# Patient Record
Sex: FEMALE | Race: WHITE | NOT HISPANIC OR LATINO | Employment: FULL TIME | URBAN - METROPOLITAN AREA
[De-identification: names, ages, dates, MRNs, and addresses within clinical notes are randomized per-mention and may not be internally consistent; named-entity substitution may affect disease eponyms.]

---

## 2017-07-20 ENCOUNTER — TRANSFERRED RECORDS (OUTPATIENT)
Dept: HEALTH INFORMATION MANAGEMENT | Facility: CLINIC | Age: 27
End: 2017-07-20

## 2017-10-11 ENCOUNTER — OFFICE VISIT (OUTPATIENT)
Dept: OPTOMETRY | Facility: CLINIC | Age: 27
End: 2017-10-11
Payer: COMMERCIAL

## 2017-10-11 DIAGNOSIS — N94.6 DYSMENORRHEA: ICD-10-CM

## 2017-10-11 DIAGNOSIS — H40.039 ANATOMICAL NARROW ANGLE: Primary | ICD-10-CM

## 2017-10-11 DIAGNOSIS — Z01.00 EXAMINATION OF EYES AND VISION: ICD-10-CM

## 2017-10-11 DIAGNOSIS — H21.502: ICD-10-CM

## 2017-10-11 PROCEDURE — 92015 DETERMINE REFRACTIVE STATE: CPT | Performed by: OPTOMETRIST

## 2017-10-11 PROCEDURE — 92014 COMPRE OPH EXAM EST PT 1/>: CPT | Performed by: OPTOMETRIST

## 2017-10-11 RX ORDER — NORGESTREL-ETHINYL ESTRADIOL 0.3-0.03MG
TABLET ORAL
Qty: 84 TABLET | Refills: 3 | Status: SHIPPED | OUTPATIENT
Start: 2017-10-11 | End: 2018-09-24

## 2017-10-11 RX ORDER — CIPROFLOXACIN AND DEXAMETHASONE 3; 1 MG/ML; MG/ML
SUSPENSION/ DROPS AURICULAR (OTIC)
COMMUNITY
Start: 2017-09-26 | End: 2017-11-06

## 2017-10-11 ASSESSMENT — REFRACTION_MANIFEST
OD_AXIS: 050
OD_SPHERE: -0.25
OS_AXIS: 141
OD_CYLINDER: +0.25
OS_CYLINDER: +0.25
METHOD_AUTOREFRACTION: 1
OS_SPHERE: -0.50
OD_SPHERE: -0.50
OS_SPHERE: PLANO
OS_CYLINDER: SPHERE
OD_CYLINDER: +0.25
OD_AXIS: 061

## 2017-10-11 ASSESSMENT — VISUAL ACUITY
OD_SC: 20/20
OS_SC: 20/20
OS_SC: 20/20
OD_SC: 20/20
METHOD: SNELLEN - LINEAR

## 2017-10-11 ASSESSMENT — TONOMETRY
IOP_METHOD: APPLANATION
OS_IOP_MMHG: 16
OD_IOP_MMHG: 21

## 2017-10-11 ASSESSMENT — CUP TO DISC RATIO
OS_RATIO: 0.1
OD_RATIO: 0.1

## 2017-10-11 ASSESSMENT — CONF VISUAL FIELD
OD_NORMAL: 1
OS_NORMAL: 1

## 2017-10-11 ASSESSMENT — KERATOMETRY
OS_AXISANGLE_DEGREES: 110
OS_K2POWER_DIOPTERS: 43.25
OD_AXISANGLE_DEGREES: 83
METHOD_AUTO_MANUAL: AUTOMATED
OD_K2POWER_DIOPTERS: 43.37
OS_AXISANGLE2_DEGREES: 20
OD_AXISANGLE2_DEGREES: 173
OD_K1POWER_DIOPTERS: 42.75
OS_K1POWER_DIOPTERS: 42.62

## 2017-10-11 ASSESSMENT — SLIT LAMP EXAM - LIDS
COMMENTS: NORMAL
COMMENTS: NORMAL

## 2017-10-11 ASSESSMENT — EXTERNAL EXAM - LEFT EYE: OS_EXAM: NORMAL

## 2017-10-11 ASSESSMENT — EXTERNAL EXAM - RIGHT EYE: OD_EXAM: NORMAL

## 2017-10-11 NOTE — TELEPHONE ENCOUNTER
Future appts noted.  Prescription approved per [facility] Refill Protocol.  Margie Camejo RN

## 2017-10-11 NOTE — MR AVS SNAPSHOT
After Visit Summary   10/11/2017    Connie Bhakta    MRN: [id number]           Patient Information     Date Of Birth          1990        Visit Information        Provider Department      10/11/2017 3:40 PM Jeanette Cotton OD [facility]        Today's Diagnoses     Anatomical narrow angle    -  1    Examination of eyes and vision          Care Instructions    Schedule a narrow angle assessment at [place]           Follow-ups after your visit        Additional Services     OPHTHALMOLOGY ADULT REFERRAL       Your provider has referred you to:  [facility]: [facility] [phone]   [email or web address]      Please be aware that coverage of these services is subject to the terms and limitations of your health insurance plan.  Call member services at your health plan with any benefit or coverage questions.      Please bring the following to your appointment:  >>   Any x-rays, CTs or MRIs which have been performed.  Contact the facility where they were done to arrange for  prior to your scheduled appointment.  Any new CT, MRI or other procedures ordered by your specialist must be performed at a [place] facility or coordinated by your clinic's referral office.    >>   List of current medications   >>   This referral request   >>   Any documents/labs given to you for this referral                  Follow-up notes from your care team     Return in about 1 year (around 10/11/2018).      Your next 10 appointments already scheduled     Nov 06, 2017  9:30 AM CST   Office Visit with Agustin Diop MD   [facility]    [address]  [facility] [id number] [phone]           Bring a current list of meds and any records pertaining to this visit. For Physicals, please bring immunization records and any forms needing to be filled out. Please arrive 10 minutes early to complete  "paperwork.            2017 10:20 AM CST   PHYSICAL with Pat Pearson MD   [facility]    [address]  [facility] [address] [phone]              Who to contact     If you have questions or need follow up information about today's clinic visit or your schedule please contact [facility] directly at [phone].  Normal or non-critical lab and imaging results will be communicated to you by Blueboxhart, letter or phone within 4 business days after the clinic has received the results. If you do not hear from us within 7 days, please contact the clinic through Blueboxhart or phone. If you have a critical or abnormal lab result, we will notify you by phone as soon as possible.  Submit refill requests through LendingStar or call your pharmacy and they will forward the refill request to us. Please allow 3 business days for your refill to be completed.          Additional Information About Your Visit        LendingStar Information     LendingStar lets you send messages to your doctor, view your test results, renew your prescriptions, schedule appointments and more. To sign up, go to [email or web address] . Click on \"Log in\" on the left side of the screen, which will take you to the Welcome page. Then click on \"Sign up Now\" on the right side of the page.     You will be asked to enter the access code listed below, as well as some personal information. Please follow the directions to create your username and password.     Your access code is: [email or web address]  Expires: 2018  4:08 PM     Your access code will  in 90 days. If you need help or a new code, please call your [place] clinic or [phone].        Care EveryWhere ID     This is your Care EveryWhere ID. This could be used by other organizations to access your [place] medical records  [id number]         Blood Pressure from Last 3 Encounters:   16 102/62   16 106/60 "   09/16/16 94/62    Weight from Last 3 Encounters:   11/11/16 68.3 kg (150 lb 8 oz)   09/16/16 69.4 kg (153 lb)   09/16/16 69.6 kg (153 lb 6.4 oz)              We Performed the Following     EYE EXAM (SIMPLE-NONBILLABLE)     OPHTHALMOLOGY ADULT REFERRAL     REFRACTION          Today's Medication Changes          These changes are accurate as of: 10/11/17  4:08 PM.  If you have any questions, ask your nurse or doctor.               These medicines have changed or have updated prescriptions.        Dose/Directions    CRYSELLE-28 0.3-30 MG-MCG per tablet   This may have changed:  See the new instructions.   Used for:  Dysmenorrhea   Generic drug:  norgestrel-ethinyl estradiol   Changed by:  Agustin Diop MD        TAKE ONE TABLET BY MOUTH EVERY DAY   Quantity:  84 tablet   Refills:  3            Where to get your medicines      These medications were sent to [facility] - [place] [address] [address] [facility]  [address] [facility] [address]    Hours:  Mon-Fri 8:30am-5:00pm Toll Free [phone] Phone:  [phone]     CRYSELLE-28 0.3-30 MG-MCG per tablet                Primary Care Provider Office Phone # Fax #    Pat Pearson [phone]       [address] [facility] [address]        Equal Access to Services     [facility] AH: Hadii jake monroy hadasho Soomaali, waaxda luqadaha, qaybta kaalmada adeegyada, jocelynn meneses. So [facility] [phone].    ATENCIÓN: Si habla español, tiene a rodriguez disposición servicios gratuitos de asistencia lingüística. Llame al [phone].    We comply with applicable federal civil rights laws and [place] laws. We do not discriminate on the basis of race, color, national origin, age, disability, sex, sexual orientation, or gender identity.            Thank you!     Thank you for choosing [facility]  for your care. Our goal is always to provide you with excellent care.  Hearing back from our patients is one way we can continue to improve our services. Please take a few minutes to complete the written survey that you may receive in the mail after your visit with us. Thank you!             Your Updated Medication List - Protect others around you: Learn how to safely use, store and throw away your medicines at www.disposemymeds.org.          This list is accurate as of: 10/11/17  4:08 PM.  Always use your most recent med list.                   Brand Name Dispense Instructions for use Diagnosis    CIPRODEX otic suspension   Generic drug:  ciprofloxacin-dexamethasone           CRYSELLE-28 0.3-30 MG-MCG per tablet   Generic drug:  norgestrel-ethinyl estradiol     84 tablet    TAKE ONE TABLET BY MOUTH EVERY DAY    Dysmenorrhea

## 2017-10-11 NOTE — PROGRESS NOTES
Chief Complaint   Patient presents with     COMPREHENSIVE EYE EXAM     Routine      Mom has a history of narrow angle glaucoma and had surgery   Otherwise no concerns    Last Eye Exam: 1 yr Dr. Guerra  Dilated Previously: Yes    What are you currently using to see?  does not use glasses or contacts       Distance Vision Acuity: Satisfied with vision    Near Vision Acuity: Satisfied with vision while reading  unaided    Eye Comfort: good  Do you use eye drops? 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       OBJECTIVE: See Ophthalmology exam    ASSESSMENT:    ICD-10-CM    1. Anatomical narrow angle H40.039 OPHTHALMOLOGY ADULT REFERRAL   2. Examination of eyes and vision Z01.00 EYE EXAM (SIMPLE-NONBILLABLE)     REFRACTION      IOP increase from last year/ and IOP asymmetry with frontal headaches on R side when asked     PLAN:   Defer dilation, send to [facility] for gonio, and laser PI if necessary  S/s angle closure given     Jeanette Cotton OD

## 2017-10-25 ENCOUNTER — OFFICE VISIT (OUTPATIENT)
Dept: OPHTHALMOLOGY | Facility: CLINIC | Age: 27
End: 2017-10-25
Payer: COMMERCIAL

## 2017-10-25 DIAGNOSIS — Q13.89 PERSISTENT PUPILLARY MEMBRANE: ICD-10-CM

## 2017-10-25 DIAGNOSIS — H40.039 ANATOMICAL NARROW ANGLE: Primary | ICD-10-CM

## 2017-10-25 PROCEDURE — 92020 GONIOSCOPY: CPT | Performed by: STUDENT IN AN ORGANIZED HEALTH CARE EDUCATION/TRAINING PROGRAM

## 2017-10-25 PROCEDURE — 92002 INTRM OPH EXAM NEW PATIENT: CPT | Performed by: STUDENT IN AN ORGANIZED HEALTH CARE EDUCATION/TRAINING PROGRAM

## 2017-10-25 ASSESSMENT — SLIT LAMP EXAM - LIDS
COMMENTS: NORMAL
COMMENTS: NORMAL

## 2017-10-25 ASSESSMENT — TONOMETRY
OS_IOP_MMHG: 15
OD_IOP_MMHG: 15
IOP_METHOD: APPLANATION

## 2017-10-25 ASSESSMENT — EXTERNAL EXAM - RIGHT EYE: OD_EXAM: NORMAL

## 2017-10-25 ASSESSMENT — VISUAL ACUITY
OD_SC: 20/20
METHOD: SNELLEN - LINEAR
OS_SC: 20/20

## 2017-10-25 ASSESSMENT — CUP TO DISC RATIO
OD_RATIO: 0.1 UD
OS_RATIO: 0.1 UD

## 2017-10-25 ASSESSMENT — EXTERNAL EXAM - LEFT EYE: OS_EXAM: NORMAL

## 2017-10-25 NOTE — PROGRESS NOTES
Current Eye Medications:  no     Subjective:  Referral by Jeanette Cotton O.D. For narrow angles.  Patient reports is seeing well, vision seems unchanged and states eyes seem otherwise fine. Pt saw Dr. Cotton about 2 weeks ago.    Mother has history of narrow angles. Says she sometimes has frontal headaches - no temporal history noted, no associated eye pain.      Objective:  See Ophthalmology Exam.       Assessment:  Connie Bhakta is a 27 year old female who presents with:   Encounter Diagnoses   Name Primary?     Anatomical narrow angle - non-occludable 2017 Not occludeable on gonio at present.   Will watch closely.     Persistent pupillary membrane, left eye        Plan:  Continue your annual exams with Dr. Carvajal (may need repeat gonio)    Darian Carvajal MD  [phone]

## 2017-10-25 NOTE — MR AVS SNAPSHOT
After Visit Summary   10/25/2017    Connie Bhakta    MRN: [id number]           Patient Information     Date Of Birth          1990        Visit Information        Provider Department      10/25/2017 3:15 PM Darian Carvajal MD [facility]        Today's Diagnoses     Anatomical narrow angle    -  1      Care Instructions    Continue your annual exams with Dr. Wei WILLARD. Wei ACUNA  [phone]            Follow-ups after your visit        Your next 10 appointments already scheduled     Nov 06, 2017  9:30 AM CST   Office Visit with Agustin Diop MD   [facility]    [address] [facility] [address] [phone]           Bring a current list of meds and any records pertaining to this visit. For Physicals, please bring immunization records and any forms needing to be filled out. Please arrive 10 minutes early to complete paperwork.            Nov 06, 2017 10:20 AM CST   PHYSICAL with Pat Pearson MD   [facility]    [address] [place]  [facility] [id number] [phone]              Who to contact     If you have questions or need follow up information about today's clinic visit or your schedule please contact [facility] directly at [phone].  Normal or non-critical lab and imaging results will be communicated to you by MyChart, letter or phone within 4 business days after the clinic has received the results. If you do not hear from us within 7 days, please contact the clinic through MyChart or phone. If you have a critical or abnormal lab result, we will notify you by phone as soon as possible.  Submit refill requests through TenderTree or call your pharmacy and they will forward the refill request to us. Please allow 3 business days for your refill to be completed.          Additional Information About Your Visit       "  MyChart Information     Yanado lets you send messages to your doctor, view your test results, renew your prescriptions, schedule appointments and more. To sign up, go to [email or web address] . Click on \"Log in\" on the left side of the screen, which will take you to the Welcome page. Then click on \"Sign up Now\" on the right side of the page.     You will be asked to enter the access code listed below, as well as some personal information. Please follow the directions to create your username and password.     Your access code is: [email or web address]  Expires: 2018  4:08 PM     Your access code will  in 90 days. If you need help or a new code, please call your [place] clinic or [phone].        Care EveryWhere ID     This is your Care EveryWhere ID. This could be used by other organizations to access your [place] medical records  [id number]         Blood Pressure from Last 3 Encounters:   16 102/62   16 106/60   16 94/62    Weight from Last 3 Encounters:   16 68.3 kg (150 lb 8 oz)   16 69.4 kg (153 lb)   16 69.6 kg (153 lb 6.4 oz)              We Performed the Following     GONIOSCOPY        Primary Care Provider Office Phone # Fax #    Krishnakumari LACY Pearson [phone]       [address] [facility] [address]        Equal Access to Services     [facility] AH: Hadii aad ku hadasho Soomaali, waaxda luqadaha, qaybta kaalmada adeegyada, waxay jared meneses. So [facility] [phone].    ATENCIÓN: Si habla harisañol, tiene a rodriguez disposición servicios gratuitos de asistencia lingüística. Llame al [phone].    We comply with applicable federal civil rights laws and [place] laws. We do not discriminate on the basis of race, color, national origin, age, disability, sex, sexual orientation, or gender identity.            Thank you!     Thank you for choosing [facility]  for your care. Our goal is always to provide " you with excellent care. Hearing back from our patients is one way we can continue to improve our services. Please take a few minutes to complete the written survey that you may receive in the mail after your visit with us. Thank you!             Your Updated Medication List - Protect others around you: Learn how to safely use, store and throw away your medicines at www.disposemymeds.org.          This list is accurate as of: 10/25/17  4:02 PM.  Always use your most recent med list.                   Brand Name Dispense Instructions for use Diagnosis    CIPRODEX otic suspension   Generic drug:  ciprofloxacin-dexamethasone           CRYSELLE-28 0.3-30 MG-MCG per tablet   Generic drug:  norgestrel-ethinyl estradiol     84 tablet    TAKE ONE TABLET BY MOUTH EVERY DAY    Dysmenorrhea

## 2017-10-26 ASSESSMENT — GONIOSCOPY
OD_INFERIOR: GRADE 1
OD_SUPERIOR: GRADE 1
OS_INFERIOR: GRADE 1
OS_NASAL: GRADE 1
OS_TEMPORAL: SLIT
OD_TEMPORAL: SLIT
OD_NASAL: GRADE 1
OS_SUPERIOR: GRADE 1

## 2017-11-06 ENCOUNTER — OFFICE VISIT (OUTPATIENT)
Dept: INTERNAL MEDICINE | Facility: CLINIC | Age: 27
End: 2017-11-06
Payer: COMMERCIAL

## 2017-11-06 ENCOUNTER — OFFICE VISIT (OUTPATIENT)
Dept: OBGYN | Facility: CLINIC | Age: 27
End: 2017-11-06
Payer: COMMERCIAL

## 2017-11-06 VITALS
DIASTOLIC BLOOD PRESSURE: 62 MMHG | HEART RATE: 84 BPM | HEIGHT: 64 IN | WEIGHT: 151.3 LBS | BODY MASS INDEX: 25.83 KG/M2 | SYSTOLIC BLOOD PRESSURE: 106 MMHG

## 2017-11-06 VITALS
SYSTOLIC BLOOD PRESSURE: 106 MMHG | HEART RATE: 139 BPM | TEMPERATURE: 97.1 F | OXYGEN SATURATION: 100 % | DIASTOLIC BLOOD PRESSURE: 72 MMHG | HEIGHT: 64 IN | BODY MASS INDEX: 26.17 KG/M2 | WEIGHT: 153.3 LBS

## 2017-11-06 DIAGNOSIS — F41.9 ANXIETY: Primary | ICD-10-CM

## 2017-11-06 DIAGNOSIS — R87.612 PAPANICOLAOU SMEAR OF CERVIX WITH LOW GRADE SQUAMOUS INTRAEPITHELIAL LESION (LGSIL): ICD-10-CM

## 2017-11-06 DIAGNOSIS — Z01.419 ENCOUNTER FOR GYNECOLOGICAL EXAMINATION WITHOUT ABNORMAL FINDING: Primary | ICD-10-CM

## 2017-11-06 DIAGNOSIS — Z00.00 ENCOUNTER FOR ROUTINE ADULT HEALTH EXAMINATION WITHOUT ABNORMAL FINDINGS: ICD-10-CM

## 2017-11-06 PROCEDURE — 99395 PREV VISIT EST AGE 18-39: CPT | Performed by: INTERNAL MEDICINE

## 2017-11-06 PROCEDURE — 88175 CYTOPATH C/V AUTO FLUID REDO: CPT | Performed by: OBSTETRICS & GYNECOLOGY

## 2017-11-06 PROCEDURE — 87624 HPV HI-RISK TYP POOLED RSLT: CPT | Performed by: OBSTETRICS & GYNECOLOGY

## 2017-11-06 PROCEDURE — 99395 PREV VISIT EST AGE 18-39: CPT | Performed by: OBSTETRICS & GYNECOLOGY

## 2017-11-06 ASSESSMENT — ANXIETY QUESTIONNAIRES
3. WORRYING TOO MUCH ABOUT DIFFERENT THINGS: MORE THAN HALF THE DAYS
GAD7 TOTAL SCORE: 18
7. FEELING AFRAID AS IF SOMETHING AWFUL MIGHT HAPPEN: MORE THAN HALF THE DAYS
1. FEELING NERVOUS, ANXIOUS, OR ON EDGE: NEARLY EVERY DAY
2. NOT BEING ABLE TO STOP OR CONTROL WORRYING: NEARLY EVERY DAY
6. BECOMING EASILY ANNOYED OR IRRITABLE: NEARLY EVERY DAY
5. BEING SO RESTLESS THAT IT IS HARD TO SIT STILL: MORE THAN HALF THE DAYS
IF YOU CHECKED OFF ANY PROBLEMS ON THIS QUESTIONNAIRE, HOW DIFFICULT HAVE THESE PROBLEMS MADE IT FOR YOU TO DO YOUR WORK, TAKE CARE OF THINGS AT HOME, OR GET ALONG WITH OTHER PEOPLE: VERY DIFFICULT

## 2017-11-06 ASSESSMENT — PATIENT HEALTH QUESTIONNAIRE - PHQ9
5. POOR APPETITE OR OVEREATING: NEARLY EVERY DAY
SUM OF ALL RESPONSES TO PHQ QUESTIONS 1-9: 6

## 2017-11-06 NOTE — PROGRESS NOTES
SUBJECTIVE:   CC: Connie Bhakta is an 27 year old woman who presents for preventive health visit.     Physical   Annual:     Getting at least 3 servings of Calcium per day::  Yes    Bi-annual eye exam::  Yes    Dental care twice a year::  Yes    Sleep apnea or symptoms of sleep apnea::  None    Diet::  Regular (no restrictions)    Frequency of exercise::  2-3 days/week    Duration of exercise::  15-30 minutes    Taking medications regularly::  Yes    Medication side effects::  None    Additional concerns today::  YES    Answers for HPI/ROS submitted by the patient on 11/6/2017   PHQ-2 Score: 2    Pt had pap and breast exam with obgyn today     Pt c/o anxiety symptoms since 6-7 yrs but more since 1.5 yrs. Pt says she feels anxious, worries all the time, has trouble relaxing, restless, easily annoyed, was not on any meds .        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, 09/16/16    +HPV HR, +HPV HR # 16     Papanicolaou smear of cervix with low grade squamous intraepithelial lesion (LGSIL) 11/21/11,05/23/12       Past Surgical History:   Procedure Laterality Date     COLPOSCOPY,BX CERVIX/ENDOCERV CURR      x 2       Current Outpatient Prescriptions   Medication Sig Dispense Refill     CRYSELLE-28 0.3-30 MG-MCG per tablet TAKE ONE TABLET BY MOUTH EVERY DAY 84 tablet 3       Family History   Problem Relation Age of Onset     Glaucoma Mother      Macular Degeneration No family hx of          Today's PHQ-2 Score: PHQ-2 ( 1999 Pfizer) 11/6/2017   Q1: Little interest or pleasure in doing things 1   Q2: Feeling down, depressed or hopeless 1   PHQ-2 Score 2   Q1: Little interest or pleasure in doing things Several days   Q2: Feeling down, depressed or hopeless Several days   PHQ-2 Score 2       Abuse: Current or Past(Physical, Sexual or Emotional)- No  Do you feel safe in your environment - Yes    Social History   Substance Use Topics     Smoking status: Never Smoker     Smokeless tobacco:  "Never Used     Alcohol use 0.0 oz/week     0 Standard drinks or equivalent per week      Comment: once a week     The patient does not drink >3 drinks per day nor >7 drinks per week.    Reviewed orders with patient.  Reviewed health maintenance and updated orders accordingly - Yes     History of abnormal Pap smear: YES - updated in Problem List and Health Maintenance accordingly    Reviewed and updated as needed this visit by clinical staff  Tobacco  Allergies  Meds  Med Hx  Surg Hx  Fam Hx  Soc Hx        Reviewed and updated as needed this visit by Provider            Review of Systems  C: NEGATIVE for fever, chills, change in weight  I: NEGATIVE for worrisome rashes, moles or lesions  E: NEGATIVE for vision changes or irritation  ENT: NEGATIVE for ear, mouth and throat problems  R: NEGATIVE for significant cough or SOB  B: NEGATIVE for masses, tenderness or discharge  CV: NEGATIVE for chest pain, palpitations or peripheral edema  GI: NEGATIVE for nausea, abdominal pain, heartburn, or change in bowel habits  : NEGATIVE for unusual urinary or vaginal symptoms. Periods are regular.  M: NEGATIVE for significant arthralgias or myalgia  N: NEGATIVE for weakness, dizziness or paresthesias  P: NEGATIVE for changes in mood or affect     OBJECTIVE:   /72  Pulse 139  Temp 97.1  F (36.2  C) (Oral)  Ht 5' 3.75\" (1.619 m)  Wt 153 lb 4.8 oz (69.5 kg)  LMP 10/11/2017 (Exact Date)  SpO2 100%  BMI 26.52 kg/m2  Physical Exam  GENERAL: healthy, alert and no distress  EYES: Eyes grossly normal to inspection, PERRL and conjunctivae and sclerae normal  HENT: ear canals and TM's normal, nose and mouth without ulcers or lesions  NECK: no adenopathy, no asymmetry, masses, or scars and thyroid normal to palpation  RESP: lungs clear to auscultation - no rales, rhonchi or wheezes  BREAST: with obgyn  CV: regular rate and rhythm, normal S1 S2, no S3 or S4, no murmur, click or rub, no peripheral edema and peripheral pulses " "strong  ABDOMEN: soft, nontender, no hepatosplenomegaly, no masses and bowel sounds normal  MS: no gross musculoskeletal defects noted, no edema  NEURO: Normal strength and tone, mentation intact and speech normal  PSYCH: mentation appears normal, affect normal/bright    ASSESSMENT/PLAN:       (Z00.00) Encounter for routine adult health examination without abnormal findings  Plan: Hemoglobin, Basic metabolic panel, ALT          (F41.9) Anxiety     Plan:starrted on  sertraline (ZOLOFT) 50 MG tablet as directed.explained clearly about the medication,insructions and side effects. Advised to fu in 1 mth.             COUNSELING:  Reviewed preventive health counseling, as reflected in patient instructions       Regular exercise       Healthy diet/nutrition         reports that she has never smoked. She has never used smokeless tobacco.    Estimated body mass index is 26.52 kg/(m^2) as calculated from the following:    Height as of this encounter: 5' 3.75\" (1.619 m).    Weight as of this encounter: 153 lb 4.8 oz (69.5 kg).   Weight management plan: Discussed healthy diet and exercise guidelines and patient will follow up in 12 months in clinic to re-evaluate.    Counseling Resources:  ATP IV Guidelines  Pooled Cohorts Equation Calculator  Breast Cancer Risk Calculator  FRAX Risk Assessment  ICSI Preventive Guidelines  Dietary Guidelines for Americans, 2010  USDA's MyPlate  ASA Prophylaxis  Lung CA Screening    Pat Pearson MD  [facility]  "

## 2017-11-06 NOTE — PROGRESS NOTES
"  SUBJECTIVE:  Connie Bhakta is an 27 year old  P0 woman who presents for annual exam. Patient's last menstrual period was 10/11/2017 (exact date).       Past Medical History:   Diagnosis Date     High risk HPV infection , 16    +HPV HR, +HPV HR # 16     Papanicolaou smear of cervix with low grade squamous intraepithelial lesion (LGSIL) 11,12     Past Surgical History:   Procedure Laterality Date     COLPOSCOPY,BX CERVIX/ENDOCERV CURR      x 2     Current Outpatient Prescriptions   Medication     CRYSELLE-28 0.3-30 MG-MCG per tablet     No current facility-administered medications for this visit.      No Known Allergies  Social History   Substance Use Topics     Smoking status: Never Smoker     Smokeless tobacco: Never Used     Alcohol use 0.0 oz/week     0 Standard drinks or equivalent per week      Comment: once a week       Review of Systems  CONSTITUTIONAL:NEGATIVE  EYES: NEGATIVE  ENT/MOUTH: NEGATIVE  RESP: NEGATIVE  CV: NEGATIVE  GI: NEGATIVE  : NEGATIVE  MUSCULOSKELATAL: NEGATIVE  INTEGUMENTARY/SKIN: NEGATIVE  BREAST: NEGATIVE  NEURO: NEGATIVE  ENDOCRINE: NEGATIVE  HEME/ALLERGY/IMMUNE: NEGATIVE  PSYCHIATRIC: NEGATIVE    OBJECTIVE:  /62 (BP Location: Left arm, Patient Position: Sitting, Cuff Size: Adult Regular)  Pulse 84  Ht 5' 3.75\" (1.619 m)  Wt 151 lb 4.8 oz (68.6 kg)  LMP 10/11/2017 (Exact Date)  BMI 26.17 kg/m2   EXAM:  GENERAL APPEARANCE: healthy, alert and no distress  BREAST: normal without masses, tenderness or nipple discharge and no palpable axillary masses or adenopathy  ABDOMEN: soft, nontender, without hepatosplenomegaly or masses    Pelvic Exam:  Urethra; negative  Vulva: No external lesions, normal hair distribution, no adenopathy  Vagina: Moist, pink, no abnormal discharge, well rugated, no lesions  Cervix: Pap smear is taken, nulliparous, smooth, pink, no visible lesions  Uterus: Normal size, anteverted, non-tender, mobile  Ovaries: No mass, " non-tender, mobile      ASSESSMENT:  Satisfactory annual gyn exam    PLAN:  (R87.612) Papanicolaou smear of cervix with low grade squamous intraepithelial lesion (LGSIL)        PE: reviewed health maintenance including diet, regular exercise and periodic exams.  Health Maintenance   Topic Date Due     PAP Q1 YR DIAGNOSTIC  11/11/2017     EYE EXAM Q1 YEAR  10/11/2018     TETANUS IMMUNIZATION (SYSTEM ASSIGNED)  11/19/2020     INFLUENZA VACCINE (SYSTEM ASSIGNED)  Addressed

## 2017-11-06 NOTE — NURSING NOTE
"Chief Complaint   Patient presents with     Gyn Exam       Initial /62 (BP Location: Left arm, Patient Position: Sitting, Cuff Size: Adult Regular)  Pulse 84  Ht 5' 3.75\" (1.619 m)  Wt 151 lb 4.8 oz (68.6 kg)  LMP 10/11/2017 (Exact Date)  BMI 26.17 kg/m2 Estimated body mass index is 26.17 kg/(m^2) as calculated from the following:    Height as of this encounter: 5' 3.75\" (1.619 m).    Weight as of this encounter: 151 lb 4.8 oz (68.6 kg).  BP completed using cuff size: regular        The following HM Due: pap smear      The following patient reported/Care Every where data was sent to:  P ABSTRACT QUALITY INITIATIVES [[id number]]  NA     n/a             "

## 2017-11-06 NOTE — LETTER
November 10, 2017      Connie Bhakta  [address] [facility] [address]    Dear ,      This letter is in regards to the pap smear and HPV (Human Papillomavirus) test you had done recently. Your PAP test result is normal, but your HPV (Human Papillomavirus) test was positive for a high risk type of the HPV. HPV is a common virus found in sexually active men and women. Some high risk strains of the HPV virus can be risk factors for later development of cervical cancer.    About 80 percent of women have been exposed to HPV virus throughout their lifetime. There is no medication for the treatment of HPV. Typically your own immune system gets rid of the virus before it does harm. HPV is spread by direct skin-to-skin contact, including sexual intercourse, oral sex, anal sex, or any other contact involving the genital area (example: hand to genital contact). It is not possible to become infected with HPV by touching an object, such as a toilet seat. Most people who are infected with HPV have no signs or symptoms.    Dr. Diop has reviewed your result along with your recent history and has recommended to have a repeat pap completed in 1 year.    Please plan to schedule a repeat pap with Dr. Diop in November 2018.  You will still need your annual physical exam for preventative screening.    If you have further questions regarding your result, please call [facility] at [phone].       Sincerely,  Lynette Nissen RN for Agustin Diop MD

## 2017-11-06 NOTE — NURSING NOTE
"Chief Complaint   Patient presents with     Physical       Initial /72  Pulse 139  Temp 97.1  F (36.2  C) (Oral)  Ht 5' 3.75\" (1.619 m)  Wt 153 lb 4.8 oz (69.5 kg)  LMP 10/11/2017 (Exact Date)  SpO2 100%  BMI 26.52 kg/m2 Estimated body mass index is 26.52 kg/(m^2) as calculated from the following:    Height as of this encounter: 5' 3.75\" (1.619 m).    Weight as of this encounter: 153 lb 4.8 oz (69.5 kg).  Medication Reconciliation: complete     Erendira Estrella CMA      "

## 2017-11-06 NOTE — MR AVS SNAPSHOT
After Visit Summary   11/6/2017    Connie Bhakta    MRN: [id number]           Patient Information     Date Of Birth          1990        Visit Information        Provider Department      11/6/2017 12:40 PM Pat Pearson MD [facility]        Today's Diagnoses     Anxiety    -  1    Encounter for routine adult health examination without abnormal findings          Care Instructions      Preventive Health Recommendations  Female Ages 26 - 39  Yearly exam:   See your health care provider every year in order to    Review health changes.     Discuss preventive care.      Review your medicines if you your doctor has prescribed any.    Until age 30: Get a Pap test every three years (more often if you have had an abnormal result).    After age 30: Talk to your doctor about whether you should have a Pap test every 3 years or have a Pap test with HPV screening every 5 years.   You do not need a Pap test if your uterus was removed (hysterectomy) and you have not had cancer.  You should be tested each year for STDs (sexually transmitted diseases), if you're at risk.   Talk to your provider about how often to have your cholesterol checked.  If you are at risk for diabetes, you should have a diabetes test (fasting glucose).  Shots: Get a flu shot each year. Get a tetanus shot every 10 years.   Nutrition:     Eat at least 5 servings of fruits and vegetables each day.    Eat whole-grain bread, whole-wheat pasta and brown rice instead of white grains and rice.    Talk to your provider about Calcium and Vitamin D.     Lifestyle    Exercise at least 150 minutes a week (30 minutes a day, 5 days of the week). This will help you control your weight and prevent disease.    Limit alcohol to one drink per day.    No smoking.     Wear sunscreen to prevent skin cancer.    See your dentist every six months for an exam and cleaning.            Follow-ups after your visit        Future tests that  "were ordered for you today     Open Future Orders        Priority Expected Expires Ordered    Hemoglobin Routine  2018    Basic metabolic panel Routine  2018    ALT Routine  2018            Who to contact     If you have questions or need follow up information about today's clinic visit or your schedule please contact [facility] directly at [phone].  Normal or non-critical lab and imaging results will be communicated to you by Sensiotechart, letter or phone within 4 business days after the clinic has received the results. If you do not hear from us within 7 days, please contact the clinic through Sensiotechart or phone. If you have a critical or abnormal lab result, we will notify you by phone as soon as possible.  Submit refill requests through OTOY or call your pharmacy and they will forward the refill request to us. Please allow 3 business days for your refill to be completed.          Additional Information About Your Visit        SensiotecharDigital Harbor Information     OTOY lets you send messages to your doctor, view your test results, renew your prescriptions, schedule appointments and more. To sign up, go to [email or web address] . Click on \"Log in\" on the left side of the screen, which will take you to the Welcome page. Then click on \"Sign up Now\" on the right side of the page.     You will be asked to enter the access code listed below, as well as some personal information. Please follow the directions to create your username and password.     Your access code is: [email or web address]  Expires: 2018  3:08 PM     Your access code will  in 90 days. If you need help or a new code, please call your [place] clinic or [phone].        Care EveryWhere ID     This is your Care EveryWhere ID. This could be used by other organizations to access your [place] medical records  [id number]        Your Vitals Were     Pulse Temperature Height Last Period Pulse Oximetry " "BMI (Body Mass Index)    139 97.1  F (36.2  C) (Oral) 5' 3.75\" (1.619 m) 10/11/2017 (Exact Date) 100% 26.52 kg/m2       Blood Pressure from Last 3 Encounters:   11/06/17 106/72   11/06/17 106/62   11/11/16 102/62    Weight from Last 3 Encounters:   11/06/17 153 lb 4.8 oz (69.5 kg)   11/06/17 151 lb 4.8 oz (68.6 kg)   11/11/16 150 lb 8 oz (68.3 kg)                 Today's Medication Changes          These changes are accurate as of: 11/6/17  1:10 PM.  If you have any questions, ask your nurse or doctor.               Start taking these medicines.        Dose/Directions    sertraline 50 MG tablet   Commonly known as:  ZOLOFT   Used for:  Anxiety   Started by:  Pat Pearson MD        Dose:  50 mg   Take 1 tablet (50 mg) by mouth daily   Quantity:  30 tablet   Refills:  1            Where to get your medicines      These medications were sent to [place] [address] [place] [facility] [address] [facility]  [address] [facility] [address]     Phone:  [phone]     sertraline 50 MG tablet                Primary Care Provider Office Phone # Fax #    Pat Pearson [phone]       [address] [facility] [address]        Equal Access to Services     [facility] Center[facility] AH: Hadii jake serranoo Soabner, waaxda luqadaha, qaybta kaalmada percy, jocelynn meneses. So [facility] [phone].    ATENCIÓN: Si habla español, tiene a rodriguez disposición servicios gratuitos de asistencia lingüística. Llame al [phone].    We comply with applicable federal civil rights laws and [place] laws. We do not discriminate on the basis of race, color, national origin, age, disability, sex, sexual orientation, or gender identity.            Thank you!     Thank you for choosing [facility]  for your care. Our goal is always to provide you with excellent care. Hearing back from our patients is one way we can continue to improve our services. " Please take a few minutes to complete the written survey that you may receive in the mail after your visit with us. Thank you!             Your Updated Medication List - Protect others around you: Learn how to safely use, store and throw away your medicines at www.disposemymeds.org.          This list is accurate as of: 11/6/17  1:10 PM.  Always use your most recent med list.                   Brand Name Dispense Instructions for use Diagnosis    CRYSELLE-28 0.3-30 MG-MCG per tablet   Generic drug:  norgestrel-ethinyl estradiol     84 tablet    TAKE ONE TABLET BY MOUTH EVERY DAY    Dysmenorrhea       sertraline 50 MG tablet    ZOLOFT    30 tablet    Take 1 tablet (50 mg) by mouth daily    Anxiety

## 2017-11-06 NOTE — MR AVS SNAPSHOT
"              After Visit Summary   11/6/2017    Connie Bhakta    MRN: [id number]           Patient Information     Date Of Birth          1990        Visit Information        Provider Department      11/6/2017 9:30 AM Agustin Diop MD [facility]        Today's Diagnoses     Encounter for gynecological examination without abnormal finding    -  1    Papanicolaou smear of cervix with low grade squamous intraepithelial lesion (LGSIL)           Follow-ups after your visit        Follow-up notes from your care team     Return in about 1 year (around 11/6/2018).      Your next 10 appointments already scheduled     Nov 06, 2017 12:40 PM CST   [facility]    [address]  [facility] [address] [phone]              Who to contact     If you have questions or need follow up information about today's clinic visit or your schedule please contact [facility] directly at [phone].  Normal or non-critical lab and imaging results will be communicated to you by Single Cell Technologyhart, letter or phone within 4 business days after the clinic has received the results. If you do not hear from us within 7 days, please contact the clinic through Single Cell Technologyhart or phone. If you have a critical or abnormal lab result, we will notify you by phone as soon as possible.  Submit refill requests through PERORA or call your pharmacy and they will forward the refill request to us. Please allow 3 business days for your refill to be completed.          Additional Information About Your Visit        Single Cell Technologyhart Information     PERORA lets you send messages to your doctor, view your test results, renew your prescriptions, schedule appointments and more. To sign up, go to [email or web address] . Click on \"Log in\" on the left side of the screen, which will take you to the Welcome page. Then click on \"Sign up Now\" " "on the right side of the page.     You will be asked to enter the access code listed below, as well as some personal information. Please follow the directions to create your username and password.     Your access code is: [email or web address]  Expires: 2018  3:08 PM     Your access code will  in 90 days. If you need help or a new code, please call your [place] clinic or [phone].        Care EveryWhere ID     This is your Care EveryWhere ID. This could be used by other organizations to access your [place] medical records  [id number]        Your Vitals Were     Pulse Height Last Period BMI (Body Mass Index)          84 5' 3.75\" (1.619 m) 10/11/2017 (Exact Date) 26.17 kg/m2         Blood Pressure from Last 3 Encounters:   17 106/62   16 102/62   16 106/60    Weight from Last 3 Encounters:   17 151 lb 4.8 oz (68.6 kg)   16 150 lb 8 oz (68.3 kg)   16 153 lb (69.4 kg)              Today, you had the following     No orders found for display       Primary Care Provider Office Phone # Fax #    Krishnakumari LACY Pearson [phone]       [address] [facility] [address]        Equal Access to Services     [facility] : Hadii jake monroy hadasho Sogarryali, waaxda luqadaha, qaybta kaalmada percy, jocelynn klein . So [facility] [phone].    ATENCIÓN: Si habla español, tiene a rodriguez disposición servicios gratuitos de asistencia lingüística. Estee al [phone].    We comply with applicable federal civil rights laws and [place] laws. We do not discriminate on the basis of race, color, national origin, age, disability, sex, sexual orientation, or gender identity.            Thank you!     Thank you for choosing [facility]  for your care. Our goal is always to provide you with excellent care. Hearing back from our patients is one way we can continue to improve our services. Please take a few minutes to complete the " written survey that you may receive in the mail after your visit with us. Thank you!             Your Updated Medication List - Protect others around you: Learn how to safely use, store and throw away your medicines at www.disposemymeds.org.          This list is accurate as of: 11/6/17  9:40 AM.  Always use your most recent med list.                   Brand Name Dispense Instructions for use Diagnosis    CRYSELLE-28 0.3-30 MG-MCG per tablet   Generic drug:  norgestrel-ethinyl estradiol     84 tablet    TAKE ONE TABLET BY MOUTH EVERY DAY    Dysmenorrhea

## 2017-11-07 ASSESSMENT — ANXIETY QUESTIONNAIRES: GAD7 TOTAL SCORE: 18

## 2017-11-08 LAB
COPATH REPORT: NORMAL
PAP: NORMAL

## 2017-11-09 LAB
FINAL DIAGNOSIS: ABNORMAL
HPV HR 12 DNA CVX QL NAA+PROBE: NEGATIVE
HPV16 DNA SPEC QL NAA+PROBE: POSITIVE
HPV18 DNA SPEC QL NAA+PROBE: NEGATIVE
SPECIMEN DESCRIPTION: ABNORMAL

## 2017-11-22 DIAGNOSIS — Z00.00 ENCOUNTER FOR ROUTINE ADULT HEALTH EXAMINATION WITHOUT ABNORMAL FINDINGS: ICD-10-CM

## 2017-11-22 LAB
ALT SERPL W P-5'-P-CCNC: 16 U/L (ref 0–50)
ANION GAP SERPL CALCULATED.3IONS-SCNC: 11 MMOL/L (ref 3–14)
BUN SERPL-MCNC: 8 MG/DL (ref 7–30)
CALCIUM SERPL-MCNC: 9.3 MG/DL (ref 8.5–10.1)
CHLORIDE SERPL-SCNC: 106 MMOL/L (ref 94–109)
CO2 SERPL-SCNC: 20 MMOL/L (ref 20–32)
CREAT SERPL-MCNC: 0.72 MG/DL (ref 0.52–1.04)
GFR SERPL CREATININE-BSD FRML MDRD: >90 ML/MIN/1.7M2
GLUCOSE SERPL-MCNC: 78 MG/DL (ref 70–99)
HGB BLD-MCNC: 12.9 G/DL (ref 11.7–15.7)
POTASSIUM SERPL-SCNC: 4.2 MMOL/L (ref 3.4–5.3)
SODIUM SERPL-SCNC: 137 MMOL/L (ref 133–144)

## 2017-11-22 PROCEDURE — 84460 ALANINE AMINO (ALT) (SGPT): CPT | Performed by: INTERNAL MEDICINE

## 2017-11-22 PROCEDURE — 36415 COLL VENOUS BLD VENIPUNCTURE: CPT | Performed by: INTERNAL MEDICINE

## 2017-11-22 PROCEDURE — 85018 HEMOGLOBIN: CPT | Performed by: INTERNAL MEDICINE

## 2017-11-22 PROCEDURE — 80048 BASIC METABOLIC PNL TOTAL CA: CPT | Performed by: INTERNAL MEDICINE

## 2017-12-05 ENCOUNTER — TELEPHONE (OUTPATIENT)
Dept: INTERNAL MEDICINE | Facility: CLINIC | Age: 27
End: 2017-12-05

## 2017-12-05 ENCOUNTER — VIRTUAL VISIT (OUTPATIENT)
Dept: INTERNAL MEDICINE | Facility: CLINIC | Age: 27
End: 2017-12-05
Payer: COMMERCIAL

## 2017-12-05 DIAGNOSIS — F41.9 ANXIETY: Primary | ICD-10-CM

## 2017-12-05 PROCEDURE — 99207 ZZC NO BILLABLE SERVICE THIS VISIT: CPT | Performed by: INTERNAL MEDICINE

## 2017-12-05 ASSESSMENT — ANXIETY QUESTIONNAIRES
7. FEELING AFRAID AS IF SOMETHING AWFUL MIGHT HAPPEN: NOT AT ALL
6. BECOMING EASILY ANNOYED OR IRRITABLE: NOT AT ALL
3. WORRYING TOO MUCH ABOUT DIFFERENT THINGS: NOT AT ALL
1. FEELING NERVOUS, ANXIOUS, OR ON EDGE: NOT AT ALL
2. NOT BEING ABLE TO STOP OR CONTROL WORRYING: NOT AT ALL
IF YOU CHECKED OFF ANY PROBLEMS ON THIS QUESTIONNAIRE, HOW DIFFICULT HAVE THESE PROBLEMS MADE IT FOR YOU TO DO YOUR WORK, TAKE CARE OF THINGS AT HOME, OR GET ALONG WITH OTHER PEOPLE: NOT DIFFICULT AT ALL
GAD7 TOTAL SCORE: 2
5. BEING SO RESTLESS THAT IT IS HARD TO SIT STILL: SEVERAL DAYS

## 2017-12-05 ASSESSMENT — PATIENT HEALTH QUESTIONNAIRE - PHQ9: 5. POOR APPETITE OR OVEREATING: SEVERAL DAYS

## 2017-12-05 NOTE — MR AVS SNAPSHOT
"              After Visit Summary   2017    Connie Bhakta    MRN: [id number]           Patient Information     Date Of Birth          1990        Visit Information        Provider Department      2017 3:20 PM Pat Pearson MD [facility]        Today's Diagnoses     Anxiety    -  1       Follow-ups after your visit        Who to contact     If you have questions or need follow up information about today's clinic visit or your schedule please contact [facility] directly at [phone].  Normal or non-critical lab and imaging results will be communicated to you by ethologyhart, letter or phone within 4 business days after the clinic has received the results. If you do not hear from us within 7 days, please contact the clinic through ethologyhart or phone. If you have a critical or abnormal lab result, we will notify you by phone as soon as possible.  Submit refill requests through SpectraFluidics or call your pharmacy and they will forward the refill request to us. Please allow 3 business days for your refill to be completed.          Additional Information About Your Visit        MyChart Information     SpectraFluidics lets you send messages to your doctor, view your test results, renew your prescriptions, schedule appointments and more. To sign up, go to [email or web address] . Click on \"Log in\" on the left side of the screen, which will take you to the Welcome page. Then click on \"Sign up Now\" on the right side of the page.     You will be asked to enter the access code listed below, as well as some personal information. Please follow the directions to create your username and password.     Your access code is: [email or web address]  Expires: 2018  3:08 PM     Your access code will  in 90 days. If you need help or a new code, please call your [facility] or [phone].        Care EveryWhere ID     This is your Care EveryWhere ID. This could be used by other " organizations to access your [place] medical records  [id number]        Your Vitals Were     Last Period                   10/11/2017 (Exact Date)            Blood Pressure from Last 3 Encounters:   11/06/17 106/72   11/06/17 106/62   11/11/16 102/62    Weight from Last 3 Encounters:   11/06/17 153 lb 4.8 oz (69.5 kg)   11/06/17 151 lb 4.8 oz (68.6 kg)   11/11/16 150 lb 8 oz (68.3 kg)              Today, you had the following     No orders found for display         Where to get your medicines      These medications were sent to [place] [address] [place] [facility] [place], [place] - [address]  [address], [facility] [address]     Phone:  [phone]     sertraline 50 MG tablet          Primary Care Provider Office Phone # Fax #    Pat HOUSE MD L[place] [phone]       [address] [facility] [address]        Equal Access to Services     [facility] : Hadii aad ku hadasho Soomaali, waaxda luqadaha, qaybta kaalmada adeegyada, waxay idiin hayaan ca klein . So [facility] [phone].    ATENCIÓN: Si habla español, tiene a rodriguez disposición servicios gratuitos de asistencia lingüística. Llame al [phone].    We comply with applicable federal civil rights laws and [place] laws. We do not discriminate on the basis of race, color, national origin, age, disability, sex, sexual orientation, or gender identity.            Thank you!     Thank you for choosing [facility]  for your care. Our goal is always to provide you with excellent care. Hearing back from our patients is one way we can continue to improve our services. Please take a few minutes to complete the written survey that you may receive in the mail after your visit with us. Thank you!             Your Updated Medication List - Protect others around you: Learn how to safely use, store and throw away your medicines at www.disposemymeds.org.          This list is accurate as of: 12/5/17 10:33  PM.  Always use your most recent med list.                   Brand Name Dispense Instructions for use Diagnosis    CRYSELLE-28 0.3-30 MG-MCG per tablet   Generic drug:  norgestrel-ethinyl estradiol     84 tablet    TAKE ONE TABLET BY MOUTH EVERY DAY    Dysmenorrhea       sertraline 50 MG tablet    ZOLOFT    90 tablet    Take 1 tablet (50 mg) by mouth daily    Anxiety

## 2017-12-05 NOTE — TELEPHONE ENCOUNTER
Reason for Call:  Other : requesting phone visit    Detailed comments: Patient called and stated that she may not be able to make the appointment she has scheduled with Dr. Pearson this afternoon.  She is requesting a phone visit instead.  She states the appointment was for a med check and the new medication is working well for her.  Please advise.    Phone Number Patient can be reached at: Home number on file [phone] (home)    Best Time: anytime    Can we leave a detailed message on this number? YES    Call taken on 12/5/2017 at 7:15 AM by Radha Briceno

## 2017-12-05 NOTE — PROGRESS NOTES
Phone visit 3.47 - 3.52 pm     Pt is a 27 year old female for phone visit to f/u on anxiety. Pt was started on Zoloft 50 mg at last OV. Pt says she is tolerating well with good symptom control.     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, 09/16/16, 11/6/17    +HPV HR, +HPV HR # 16     Papanicolaou smear of cervix with low grade squamous intraepithelial lesion (LGSIL) 11/21/11,05/23/12       Current Outpatient Prescriptions   Medication Sig Dispense Refill     sertraline (ZOLOFT) 50 MG tablet Take 1 tablet (50 mg) by mouth daily 90 tablet 1     CRYSELLE-28 0.3-30 MG-MCG per tablet TAKE ONE TABLET BY MOUTH EVERY DAY 84 tablet 3     [DISCONTINUED] sertraline (ZOLOFT) 50 MG tablet Take 1 tablet (50 mg) by mouth daily 30 tablet 1       ASSESSMENT AND PLAN:    (F41.9) Anxiety  (primary encounter diagnosis)  Comment: MIMI score today - 2, significantly improved ( was 18 at last OV)  Plan: refilled sertraline (ZOLOFT) 50 MG tablet as directed..explained clearly about the medication,insructions and side effects. F/u in 6 mths.

## 2017-12-06 ASSESSMENT — ANXIETY QUESTIONNAIRES: GAD7 TOTAL SCORE: 2

## 2018-02-28 ENCOUNTER — COMMUNICATION - HEALTHEAST (OUTPATIENT)
Dept: TELEHEALTH | Facility: CLINIC | Age: 28
End: 2018-02-28

## 2018-02-28 ENCOUNTER — OFFICE VISIT - HEALTHEAST (OUTPATIENT)
Dept: FAMILY MEDICINE | Facility: CLINIC | Age: 28
End: 2018-02-28

## 2018-02-28 DIAGNOSIS — R07.0 THROAT PAIN: ICD-10-CM

## 2018-02-28 DIAGNOSIS — J06.9 URI (UPPER RESPIRATORY INFECTION): ICD-10-CM

## 2018-02-28 LAB — DEPRECATED S PYO AG THROAT QL EIA: NORMAL

## 2018-03-01 LAB — GROUP A STREP BY PCR: NORMAL

## 2018-08-16 ENCOUNTER — TRANSFERRED RECORDS (OUTPATIENT)
Dept: HEALTH INFORMATION MANAGEMENT | Facility: CLINIC | Age: 28
End: 2018-08-16

## 2018-08-25 DIAGNOSIS — F41.9 ANXIETY: ICD-10-CM

## 2018-08-27 NOTE — TELEPHONE ENCOUNTER
"Requested Prescriptions   Pending Prescriptions Disp Refills     sertraline (ZOLOFT) 50 MG tablet [Pharmacy Med Name: SERTRALINE HCL 50 MG TABLET] 90 tablet 1    Last Written Prescription Date:  12/05/2017  Last Fill Quantity: 90,  # refills: 1   Last office visit: 11/06/2017 with prescribing provider:     Future Office Visit:   Sig: TAKE 1 TABLET BY MOUTH DAILY    SSRIs Protocol Passed    8/25/2018  1:45 AM       Passed - Recent (12 mo) or future (30 days) visit within the authorizing provider's specialty    Patient had office visit in the last 12 months or has a visit in the next 30 days with authorizing provider or within the authorizing provider's specialty.  See \"Patient Info\" tab in inbasket, or \"Choose Columns\" in Meds & Orders section of the refill encounter.           Passed - Patient is age 18 or older       Passed - No active pregnancy on record       Passed - No positive pregnancy test in last 12 months        "

## 2018-08-29 NOTE — TELEPHONE ENCOUNTER
Prescription approved per [facility] Refill Protocol.    Kanchan Conti, RN  Patient Care Supervisor  [facility]  [phone]

## 2018-09-24 DIAGNOSIS — N94.6 DYSMENORRHEA: ICD-10-CM

## 2018-09-24 RX ORDER — NORGESTREL-ETHINYL ESTRADIOL 0.3-0.03MG
TABLET ORAL
Qty: 84 TABLET | Refills: 0 | Status: SHIPPED | OUTPATIENT
Start: 2018-09-24 | End: 2018-12-10

## 2018-09-24 NOTE — TELEPHONE ENCOUNTER
"Requested Prescriptions   Pending Prescriptions Disp Refills     CRYSELLE-28 0.3-30 MG-MCG per tablet [Pharmacy Med Name: CRYSELLE-28 0.3-30MG-MCG TABS] 84 tablet 3     Sig: TAKE ONE TABLET BY MOUTH EVERY DAY    Contraceptives Protocol Passed    9/24/2018  1:19 PM       Passed - Patient is not a current smoker if age is 35 or older       Passed - Recent (12 mo) or future (30 days) visit within the authorizing provider's specialty    Patient had office visit in the last 12 months or has a visit in the next 30 days with authorizing provider or within the authorizing provider's specialty.  See \"Patient Info\" tab in inbasket, or \"Choose Columns\" in Meds & Orders section of the refill encounter.           Passed - No active pregnancy on record       Passed - No positive pregnancy test in past 12 months        3 months of rx approved. Pt is due for appt. Letter sent to patient.      Shabana Galvan RN    "

## 2018-09-24 NOTE — LETTER
[facility]  [address], [address]  [place] [address]  [phone]        September 24, 2018    Connie Bhakta  [address] [facility] [address]            Dear MsSilverio Connie Bhakta:      We recently received a request from your pharmacy requesting a refill of your birth control pills.    A review of your chart indicates that an appointment is required.  Please contact our office at [phone] to schedule an office visit.    You will need this appointment for future refills on your medication. Your medication was refilled for 3 month (s).    Taking care of your health is important to us and ongoing visits with your provider are vital to your care.  We look forward to seeing you in the near future.          Sincerely,      Agustin Diop M.D.

## 2018-10-24 ENCOUNTER — OFFICE VISIT (OUTPATIENT)
Dept: OPTOMETRY | Facility: CLINIC | Age: 28
End: 2018-10-24
Payer: COMMERCIAL

## 2018-10-24 DIAGNOSIS — H21.502: ICD-10-CM

## 2018-10-24 DIAGNOSIS — H40.039 ANATOMICAL NARROW ANGLE: ICD-10-CM

## 2018-10-24 DIAGNOSIS — H52.13 MYOPIA OF BOTH EYES: ICD-10-CM

## 2018-10-24 DIAGNOSIS — Z01.01 ENCOUNTER FOR EXAMINATION OF EYES AND VISION WITH ABNORMAL FINDINGS: Primary | ICD-10-CM

## 2018-10-24 PROCEDURE — 92015 DETERMINE REFRACTIVE STATE: CPT | Performed by: OPTOMETRIST

## 2018-10-24 PROCEDURE — 92014 COMPRE OPH EXAM EST PT 1/>: CPT | Performed by: OPTOMETRIST

## 2018-10-24 ASSESSMENT — EXTERNAL EXAM - LEFT EYE: OS_EXAM: NORMAL

## 2018-10-24 ASSESSMENT — VISUAL ACUITY
OD_SC: 20/30
OS_SC: 20/30
OS_SC: 20/20
OD_SC: 20/20
METHOD: SNELLEN - LINEAR

## 2018-10-24 ASSESSMENT — CUP TO DISC RATIO
OD_RATIO: 0.2
OS_RATIO: 0.2

## 2018-10-24 ASSESSMENT — CONF VISUAL FIELD
OS_NORMAL: 1
OD_NORMAL: 1

## 2018-10-24 ASSESSMENT — TONOMETRY
OS_IOP_MMHG: 15
OD_IOP_MMHG: 16
IOP_METHOD: APPLANATION

## 2018-10-24 ASSESSMENT — REFRACTION_MANIFEST
OS_CYLINDER: SPHERE
OS_SPHERE: -0.50
OD_CYLINDER: SPHERE
OD_SPHERE: -0.50

## 2018-10-24 ASSESSMENT — SLIT LAMP EXAM - LIDS
COMMENTS: NORMAL
COMMENTS: NORMAL

## 2018-10-24 ASSESSMENT — EXTERNAL EXAM - RIGHT EYE: OD_EXAM: NORMAL

## 2018-10-24 NOTE — PATIENT INSTRUCTIONS
Connie was advised of today's exam findings.  Optional to fill new glasses prescription, mild glasses prescription   Allow 2 weeks to adapt to change in glasses  Copy of glasses Rx provided today.  Return in 1 year for eye exam, or sooner if needed.    The affects of the dilating drops last for 4- 6 hours.  You will be more sensitive to light and vision will be blurry up close.  Mydriatic sunglasses were given if needed.    Wilfrido Greco O.D.  [facility] [place]  [address] [place] [address]. [place]  [place]  [address]    [phone]

## 2018-10-24 NOTE — LETTER
10/24/2018         RE: Connie Bhakta  [address]   [place] [place] [place] [address]        Dear Colleague,    Thank you for referring your patient, Connie Bhakta, to the [facility]. Please see a copy of my visit note below.    Chief Complaint   Patient presents with     COMPREHENSIVE EYE EXAM      Accompanied by self  Last Eye Exam: 1 year ago  Dilated Previously: Yes    What are you currently using to see?  does not use glasses or contacts       Distance Vision Acuity: Satisfied with vision    Near Vision Acuity: Satisfied with vision while reading  unaided    Eye Comfort: good  Do you use eye drops? : No  Occupation or Hobbies: [employer]    Myrna Mao, Optometric Tech          Medical, surgical and family histories reviewed and updated 10/24/2018.       OBJECTIVE: See Ophthalmology exam    ASSESSMENT:    ICD-10-CM    1. Encounter for examination of eyes and vision with abnormal findings Z01.01    2. 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 Myopia of both eyes H52.13       PLAN:     Patient Instructions   Connie was advised of today's exam findings.  Optional to fill new glasses prescription, mild glasses prescription   Allow 2 weeks to adapt to change in glasses  Copy of glasses Rx provided today.  Return in 1 year for eye exam, or sooner if needed.    The affects of the dilating drops last for 4- 6 hours.  You will be more sensitive to light and vision will be blurry up close.  Mydriatic sunglasses were given if needed.    Wilfrido Greco O.D.  [facility]  [address] [place] [address]. [place]  [address]    [phone]           Again, thank you for allowing me to participate in the care of your patient.        Sincerely,        Wilfrido Greco OD

## 2018-10-24 NOTE — MR AVS SNAPSHOT
After Visit Summary   10/24/2018    Connie Bhakta    MRN: [id number]           Patient Information     Date Of Birth          1990        Visit Information        Provider Department      10/24/2018 2:20 PM Wilfrido Greco OD [facility]        Today's Diagnoses     Encounter for examination of eyes and vision with abnormal findings    -  1    Anatomical narrow angle        Adhesion of iris, left        Myopia of both eyes          Care Instructions    Connie was advised of today's exam findings.  Optional to fill new glasses prescription, mild glasses prescription   Allow 2 weeks to adapt to change in glasses  Copy of glasses Rx provided today.  Return in 1 year for eye exam, or sooner if needed.    The affects of the dilating drops last for 4- 6 hours.  You will be more sensitive to light and vision will be blurry up close.  Mydriatic sunglasses were given if needed.    Wilfrido Greco O.D.  [address] [place]  [address] [phone]            Follow-ups after your visit        Follow-up notes from your care team     Return in about 1 year (around 10/24/2019) for Comprehensive Eye Exam.      Your next 10 appointments already scheduled     Nov 16, 2018 10:30 AM CST   PHYSICAL with Agustin Diop MD   [facility]    [address]  [address]  [facility] [address] [phone]              Who to contact     If you have questions or need follow up information about today's clinic visit or your schedule please contact [facility] directly at [phone].  Normal or non-critical lab and imaging results will be communicated to you by MyChart, letter or phone within 4 business days after the clinic has received the results. If you do not hear from us within 7 days, please contact the clinic through MyChart or phone. If you have a critical or abnormal lab  "result, we will notify you by phone as soon as possible.  Submit refill requests through [facility] or call your pharmacy and they will forward the refill request to us. Please allow 3 business days for your refill to be completed.          Additional Information About Your Visit        [facility] Information     [facility] lets you send messages to your doctor, view your test results, renew your prescriptions, schedule appointments and more. To sign up, go to [email or web address] . Click on \"Log in\" on the left side of the screen, which will take you to the Welcome page. Then click on \"Sign up Now\" on the right side of the page.     You will be asked to enter the access code listed below, as well as some personal information. Please follow the directions to create your username and password.     Your access code is: [email or web address]  Expires: 2019  2:04 PM     Your access code will  in 90 days. If you need help or a new code, please call your [facility] clinic or [phone].        Care EveryWhere ID     This is your Care EveryWhere ID. This could be used by other organizations to access your [facility] medical records  [id number]         Blood Pressure from Last 3 Encounters:   17 106/72   17 106/62   16 102/62    Weight from Last 3 Encounters:   17 69.5 kg (153 lb 4.8 oz)   17 68.6 kg (151 lb 4.8 oz)   16 68.3 kg (150 lb 8 oz)              Today, you had the following     No orders found for display       Primary Care Provider Office Phone # Fax #    Krishnakumari LACY Pearson [phone]       [address] [facility] [address]        Equal Access to Services     JUAN ENCISO : Ginny Freedman, lynda aguilar, qakeny kaalsundeep greer, jocelynn meneses. [facility] [phone].    ATENCIÓN: Si habla español, tiene a rodriguez disposición servicios gratuitos de asistencia lingüística. Llame al [phone].    We comply with " applicable federal civil rights laws and [place] laws. We do not discriminate on the basis of race, color, national origin, age, disability, sex, sexual orientation, or gender identity.            Thank you!     Thank you for choosing [facility]  for your care. Our goal is always to provide you with excellent care. Hearing back from our patients is one way we can continue to improve our services. Please take a few minutes to complete the written survey that you may receive in the mail after your visit with us. Thank you!             Your Updated Medication List - Protect others around you: Learn how to safely use, store and throw away your medicines at www.disposemymeds.org.          This list is accurate as of 10/24/18  2:41 PM.  Always use your most recent med list.                   Brand Name Dispense Instructions for use Diagnosis    CRYSELLE-28 0.3-30 MG-MCG per tablet   Generic drug:  norgestrel-ethinyl estradiol     84 tablet    TAKE ONE TABLET BY MOUTH EVERY DAY    Dysmenorrhea       sertraline 50 MG tablet    ZOLOFT    90 tablet    TAKE 1 TABLET BY MOUTH DAILY    Anxiety

## 2018-10-24 NOTE — PROGRESS NOTES
Chief Complaint   Patient presents with     COMPREHENSIVE EYE EXAM      Accompanied by self  Last Eye Exam: 1 year ago  Dilated Previously: Yes    What are you currently using to see?  does not use glasses or contacts       Distance Vision Acuity: Satisfied with vision    Near Vision Acuity: Satisfied with vision while reading  unaided    Eye Comfort: good  Do you use eye drops? : No  Occupation or Hobbies: [employer]    Myrna Mao, Optometric Tech          Medical, surgical and family histories reviewed and updated 10/24/2018.       OBJECTIVE: See Ophthalmology exam    ASSESSMENT:    ICD-10-CM    1. Encounter for examination of eyes and vision with abnormal findings Z01.01    2. 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 Myopia of both eyes H52.13       PLAN:     Patient Instructions   Connie was advised of today's exam findings.  Optional to fill new glasses prescription, mild glasses prescription   Allow 2 weeks to adapt to change in glasses  Copy of glasses Rx provided today.  Return in 1 year for eye exam, or sooner if needed.    The affects of the dilating drops last for 4- 6 hours.  You will be more sensitive to light and vision will be blurry up close.  Mydriatic sunglasses were given if needed.    Wilfrido Greco O.D.  [facility]  [address] [place] [address]. [place]  [address]    [phone]

## 2018-12-10 ENCOUNTER — OFFICE VISIT (OUTPATIENT)
Dept: OBGYN | Facility: CLINIC | Age: 28
End: 2018-12-10
Payer: COMMERCIAL

## 2018-12-10 VITALS
HEIGHT: 64 IN | SYSTOLIC BLOOD PRESSURE: 108 MMHG | DIASTOLIC BLOOD PRESSURE: 70 MMHG | WEIGHT: 168 LBS | BODY MASS INDEX: 28.68 KG/M2

## 2018-12-10 DIAGNOSIS — N94.6 DYSMENORRHEA: ICD-10-CM

## 2018-12-10 DIAGNOSIS — R87.612 PAPANICOLAOU SMEAR OF CERVIX WITH LOW GRADE SQUAMOUS INTRAEPITHELIAL LESION (LGSIL): ICD-10-CM

## 2018-12-10 DIAGNOSIS — F41.9 ANXIETY: ICD-10-CM

## 2018-12-10 DIAGNOSIS — Z01.419 ENCOUNTER FOR GYNECOLOGICAL EXAMINATION WITHOUT ABNORMAL FINDING: Primary | ICD-10-CM

## 2018-12-10 PROCEDURE — 99214 OFFICE O/P EST MOD 30 MIN: CPT | Performed by: OBSTETRICS & GYNECOLOGY

## 2018-12-10 PROCEDURE — 87624 HPV HI-RISK TYP POOLED RSLT: CPT | Performed by: OBSTETRICS & GYNECOLOGY

## 2018-12-10 PROCEDURE — 88175 CYTOPATH C/V AUTO FLUID REDO: CPT | Performed by: OBSTETRICS & GYNECOLOGY

## 2018-12-10 ASSESSMENT — MIFFLIN-ST. JEOR: SCORE: 1472.04

## 2018-12-10 NOTE — NURSING NOTE
"Chief Complaint   Patient presents with     Gyn Exam     Diagnostic pap due- no concerns        Initial /70 (BP Location: Right arm, Patient Position: Sitting, Cuff Size: Adult Regular)   Ht 1.626 m (5' 4\")   Wt 76.2 kg (168 lb)   LMP 11/10/2018 (Exact Date)   Breastfeeding? No   BMI 28.84 kg/m   Estimated body mass index is 28.84 kg/m  as calculated from the following:    Height as of this encounter: 1.626 m (5' 4\").    Weight as of this encounter: 76.2 kg (168 lb).  BP completed using cuff size: regular    Questioned patient about current smoking habits.  Pt. has never smoked.          The following HM Due: pap smear    Evan Cotton CMA                "

## 2018-12-10 NOTE — PROGRESS NOTES
"SUBJECTIVE:  Connie Bhakta is an 28 year old  P0 woman who presents for annual exam. Patient's last menstrual period was 11/10/2018 (exact date).         Past Medical History:   Diagnosis Date     Anxiety      High risk HPV infection , 16, 17    +HPV HR, +HPV HR # 16     Papanicolaou smear of cervix with low grade squamous intraepithelial lesion (LGSIL) 11,12     Past Surgical History:   Procedure Laterality Date     COLPOSCOPY,BX CERVIX/ENDOCERV CURR      x 2     Current Outpatient Medications   Medication     CRYSELLE-28 0.3-30 MG-MCG per tablet     sertraline (ZOLOFT) 50 MG tablet     No current facility-administered medications for this visit.      No Known Allergies  Social History     Tobacco Use     Smoking status: Never Smoker     Smokeless tobacco: Never Used   Substance Use Topics     Alcohol use: Yes     Alcohol/week: 0.0 oz     Comment: once a week       Review of Systems  10 point review of systems negative    OBJECTIVE:  /70 (BP Location: Right arm, Patient Position: Sitting, Cuff Size: Adult Regular)   Ht 1.626 m (5' 4\")   Wt 76.2 kg (168 lb)   LMP 11/10/2018 (Exact Date)   Breastfeeding? No   BMI 28.84 kg/m     EXAM:  GENERAL APPEARANCE: healthy, alert and no distress  BREAST: normal without masses, tenderness or nipple discharge and no palpable axillary masses or adenopathy  ABDOMEN: soft, nontender, without hepatosplenomegaly or masses    Pelvic Exam:  Urethra; negative  Vulva: No external lesions, normal hair distribution, no adenopathy  Vagina: Moist, pink, no abnormal discharge, well rugated, no lesions  Cervix: Pap smear is taken, nulliparous, smooth, pink, no visible lesions  Uterus: Normal size, anteverted, non-tender, mobile  Ovaries: No mass, non-tender, mobile      ASSESSMENT:  Satisfactory annual gyn exam    PLAN:  (R87.612) Papanicolaou smear of cervix with low grade squamous intraepithelial lesion (LGSIL)        PE: reviewed health maintenance " including diet, regular exercise and periodic exams.  Health Maintenance   Topic Date Due     DTAP/TDAP/TD IMMUNIZATION (1 - Tdap) 10/22/1996     HIV SCREEN (SYSTEM ASSIGNED)  10/22/2007     INFLUENZA VACCINE (1) 09/01/2018     PAP Q1 YR DIAGNOSTIC  11/06/2018     PHQ-2 Q1 YR  12/05/2018     EYE EXAM Q1 YEAR  10/24/2019     ZOSTER IMMUNIZATION (1 of 2) 10/22/2039     IPV IMMUNIZATION  Aged Out     MENINGITIS IMMUNIZATION  Aged Out

## 2018-12-13 LAB
COPATH REPORT: NORMAL
PAP: NORMAL

## 2018-12-14 LAB
FINAL DIAGNOSIS: ABNORMAL
HPV HR 12 DNA CVX QL NAA+PROBE: NEGATIVE
HPV16 DNA SPEC QL NAA+PROBE: POSITIVE
HPV18 DNA SPEC QL NAA+PROBE: NEGATIVE
SPECIMEN DESCRIPTION: ABNORMAL
SPECIMEN SOURCE CVX/VAG CYTO: ABNORMAL

## 2019-01-09 ENCOUNTER — OFFICE VISIT (OUTPATIENT)
Dept: OBGYN | Facility: CLINIC | Age: 29
End: 2019-01-09
Payer: COMMERCIAL

## 2019-01-09 VITALS — DIASTOLIC BLOOD PRESSURE: 72 MMHG | SYSTOLIC BLOOD PRESSURE: 110 MMHG | WEIGHT: 172 LBS | BODY MASS INDEX: 29.52 KG/M2

## 2019-01-09 DIAGNOSIS — B97.7 HIGH RISK HPV INFECTION: ICD-10-CM

## 2019-01-09 PROCEDURE — 57452 EXAM OF CERVIX W/SCOPE: CPT | Performed by: OBSTETRICS & GYNECOLOGY

## 2019-01-09 NOTE — PROGRESS NOTES
Connie Bhakta is a 28 year old female who presents for repeat colposcopy      Last pap normal      HR HPV    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, 09/16/16, 11/6/17, 12/10/18    see problem list     Papanicolaou smear of cervix with low grade squamous intraepithelial lesion (LGSIL) 11/21/11,05/23/12     Family History   Problem Relation Age of Onset     Glaucoma Mother      Macular Degeneration No family hx of        Previous history of abnormal paps?: Yes   History of cryotherapy (freezing)?: : No  History of LEEP: : No     Patient's last menstrual period was 12/13/2018 (approximate).      History   Smoking Status     Never Smoker   Smokeless Tobacco     Never Used       Allergies as of 01/09/2019     (No Known Allergies)       PROCEDURE:  Before the procedure, it was ensured that the patient was educated regarding the nature of her findings to date, the implications of them, and what was to be done. She has been made aware of the role of HPV, the natural history of infection, ways to minimize her future risk, the effect of HPV on the cervix, and treatment options available should they be indicated. The pathophysiology of the cervix, including a discussion of squamous vs. endometrial cells, and squamous metaplasia have all been reviewed, using illustrations and sketches. The details of the colposcopic procedure were reviewed, as well as the risks of missed diagnoses, pain, infection and bleeding. All questions were answered before proceeding, and informed consent was therefore obtained.      Pap repeated?:  No  SCJ seen?:  yes  Endocervical speculum needed?:  No  ECC done?:  No  EndoPap done?:  NO  Lugol's solution used?:  No  Satisfactory examination?:  yes    Vaginal vault: normal to cursory inspection   Urethra normal?:  yes  Labia normal?:  yes  Perineum normal?:  yes  Rectum normal?:  yes    FINDINGS:  Please see image   Cervix: no visible lesions  Procedure:  biopsies taken (not including ECC): 0.    Procedure Note;     -cervix; visualized with lighted speculum     -painted with vinegar     -colposcopic exam of upper vagina and cervix          -no visible lesions   all instruments removed from vagina      Assessment: Normal exam without visible pathology      Plan: repeat pap smear in one year

## 2019-01-09 NOTE — NURSING NOTE
"Chief Complaint   Patient presents with     Colposcopy     last pap 12/10/18 NIL - HPV 16 - pap results of 17 were the same - colp 16       Initial /72 (BP Location: Right arm, Patient Position: Chair, Cuff Size: Adult Regular)   Wt 78 kg (172 lb)   LMP 2018 (Approximate)   BMI 29.52 kg/m   Estimated body mass index is 29.52 kg/m  as calculated from the following:    Height as of 12/10/18: 1.626 m (5' 4\").    Weight as of this encounter: 78 kg (172 lb).  BP completed using cuff size: regular    Questioned patient about current smoking habits.  Pt. has never smoked.          The following HM Due: NONE      Nurse assisted visit.  Corazon Schuler MA.               "

## 2019-06-12 ENCOUNTER — OFFICE VISIT (OUTPATIENT)
Dept: OPTOMETRY | Facility: CLINIC | Age: 29
End: 2019-06-12
Payer: COMMERCIAL

## 2019-06-12 DIAGNOSIS — H43.393 VITREOUS FLOATER, BILATERAL: ICD-10-CM

## 2019-06-12 DIAGNOSIS — G51.4 EYELID MYOKYMIA: Primary | ICD-10-CM

## 2019-06-12 PROCEDURE — 99213 OFFICE O/P EST LOW 20 MIN: CPT | Performed by: OPTOMETRIST

## 2019-06-12 ASSESSMENT — EXTERNAL EXAM - LEFT EYE: OS_EXAM: NORMAL

## 2019-06-12 ASSESSMENT — EXTERNAL EXAM - RIGHT EYE: OD_EXAM: NORMAL

## 2019-06-12 ASSESSMENT — TONOMETRY
OS_IOP_MMHG: 15
IOP_METHOD: APPLANATION
OD_IOP_MMHG: 18

## 2019-06-12 ASSESSMENT — CONF VISUAL FIELD
OD_NORMAL: 1
OS_NORMAL: 1

## 2019-06-12 ASSESSMENT — SLIT LAMP EXAM - LIDS
COMMENTS: NORMAL
COMMENTS: NORMAL

## 2019-06-12 ASSESSMENT — CUP TO DISC RATIO
OS_RATIO: 0.2
OD_RATIO: 0.2

## 2019-06-12 ASSESSMENT — VISUAL ACUITY
OD_SC: 20/20
METHOD: SNELLEN - LINEAR
OS_SC: 20/20
OD_SC+: -1

## 2019-06-12 NOTE — PROGRESS NOTES
Chief Complaint   Patient presents with     Floaters       Do you wear contact lenses? No        Myrna Mao, Optometric Tech     See Review Of Systems   Eyes: eyelid twitching, floaters left eye  Constitutional: No fevers, chills, or weight changes.    Medical, surgical and family histories reviewed and updated 6/12/2019.         OBJECTIVE: See Ophthalmology exam    ASSESSMENT:    ICD-10-CM    1. 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 Vitreous floater, bilateral H43.393       PLAN:    Patient Instructions   Myokymia is a common symptom characterized by sudden , involuntary twitching of the eyelid muscles, generally around one eye.   It is caused by a misfiring of the neurons supplying the eyelid muscles. The most common cause of this annoying twitching generally results from loss of sleep, fatigue, stress and anxiety.  Other influences may include over- indulgent use of alcohol, smoking, caffeine and energy drinks. Eye irritation or a foreign body in the eye can also be triggers.    These spontaneous and annoying muscle spasms are a frequently reported symptom, however typically they are of no medical concern and resolve without medical treatment.  Most cases usually resolve within several weeks.  If eyelid twitching occurs with frequency, obtaining adequate rest and reducing caffeine intake is the easiest cure to try first.  A secondary remedy is the temporary use of either topical or oral antihistamines such as zaditor or alaway drops.  (Caution that oral antihistamines can cause drowsiness.)  Other reported treatments include the use of cold packs over the affected eye.     In extreme cases, where the twitches do not resolve despite a change in rest and / or lifestyle habits, Botox injections have been documented to stop persistent twitching episodes.    Floaters are small specks or clouds that move in your vision. They may appear to dart away when you try to look directly at them. They are most noticeable in bright  light when looking at a blank wall, a solid colored surface, or the freddie.    These happen with age as the vitreous gel ( the fluid that fills the eyeball), starts to become more liquified and shrinks to form clumps or strands.   This is more common with people that are nearsighted or have undergone certain ocular surgeries, inflammatory conditions or experienced trauma.  You should always be checked by an eye doctor right away if you have a sudden change in floaters, flashes, or change/ loss in peripheral vision. This could indicate a retinal tear, hole or detachment that could lead to permanent vision loss if not treated.      The effects of the dilating drops last for 4- 6 hours.  You will be more sensitive to light and vision will be blurry up close.  Mydriatic sunglasses were given if needed.      Wilfrido Greco O.D.  [facility] [place]  [place]  [address]    [phone]

## 2019-06-12 NOTE — LETTER
6/12/2019         RE: Connie Bhakta  [address]   [facility] [address]        Dear Colleague,    Thank you for referring your patient, Connie Bhakta, to the [facility]. Please see a copy of my visit note below.    Chief Complaint   Patient presents with     Floaters       Do you wear contact lenses? No        Myrna Mao, Optometric Tech     See Review Of Systems   Eyes: eyelid twitching, floaters left eye  Constitutional: No fevers, chills, or weight changes.    Medical, surgical and family histories reviewed and updated 6/12/2019.         OBJECTIVE: See Ophthalmology exam    ASSESSMENT:    ICD-10-CM    1. 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 Vitreous floater, bilateral H43.393       PLAN:    Patient Instructions   Myokymia is a common symptom characterized by sudden , involuntary twitching of the eyelid muscles, generally around one eye.   It is caused by a misfiring of the neurons supplying the eyelid muscles. The most common cause of this annoying twitching generally results from loss of sleep, fatigue, stress and anxiety.  Other influences may include over- indulgent use of alcohol, smoking, caffeine and energy drinks. Eye irritation or a foreign body in the eye can also be triggers.    These spontaneous and annoying muscle spasms are a frequently reported symptom, however typically they are of no medical concern and resolve without medical treatment.  Most cases usually resolve within several weeks.  If eyelid twitching occurs with frequency, obtaining adequate rest and reducing caffeine intake is the easiest cure to try first.  A secondary remedy is the temporary use of either topical or oral antihistamines such as zaditor or alaway drops.  (Caution that oral antihistamines can cause drowsiness.)  Other reported treatments include the use of cold packs over the affected eye.     In extreme cases, where the twitches do not resolve despite a change in rest and / or lifestyle  habits, Botox injections have been documented to stop persistent twitching episodes.    Floaters are small specks or clouds that move in your vision. They may appear to dart away when you try to look directly at them. They are most noticeable in bright light when looking at a blank wall, a solid colored surface, or the freddie.    These happen with age as the vitreous gel ( the fluid that fills the eyeball), starts to become more liquified and shrinks to form clumps or strands.   This is more common with people that are nearsighted or have undergone certain ocular surgeries, inflammatory conditions or experienced trauma.  You should always be checked by an eye doctor right away if you have a sudden change in floaters, flashes, or change/ loss in peripheral vision. This could indicate a retinal tear, hole or detachment that could lead to permanent vision loss if not treated.      The effects of the dilating drops last for 4- 6 hours.  You will be more sensitive to light and vision will be blurry up close.  Mydriatic sunglasses were given if needed.      Wilfrido Greco O.D.  [address]  [address]    [phone]             Again, thank you for allowing me to participate in the care of your patient.        Sincerely,        Wilfrido Greco OD

## 2019-06-12 NOTE — PATIENT INSTRUCTIONS
Myokymia is a common symptom characterized by sudden , involuntary twitching of the eyelid muscles, generally around one eye.   It is caused by a misfiring of the neurons supplying the eyelid muscles. The most common cause of this annoying twitching generally results from loss of sleep, fatigue, stress and anxiety.  Other influences may include over- indulgent use of alcohol, smoking, caffeine and energy drinks. Eye irritation or a foreign body in the eye can also be triggers.    These spontaneous and annoying muscle spasms are a frequently reported symptom, however typically they are of no medical concern and resolve without medical treatment.  Most cases usually resolve within several weeks.  If eyelid twitching occurs with frequency, obtaining adequate rest and reducing caffeine intake is the easiest cure to try first.  A secondary remedy is the temporary use of either topical or oral antihistamines such as zaditor or alaway drops.  (Caution that oral antihistamines can cause drowsiness.)  Other reported treatments include the use of cold packs over the affected eye.     In extreme cases, where the twitches do not resolve despite a change in rest and / or lifestyle habits, Botox injections have been documented to stop persistent twitching episodes.    Floaters are small specks or clouds that move in your vision. They may appear to dart away when you try to look directly at them. They are most noticeable in bright light when looking at a blank wall, a solid colored surface, or the freddie.    These happen with age as the vitreous gel ( the fluid that fills the eyeball), starts to become more liquified and shrinks to form clumps or strands.   This is more common with people that are nearsighted or have undergone certain ocular surgeries, inflammatory conditions or experienced trauma.  You should always be checked by an eye doctor right away if you have a sudden change in floaters, flashes, or change/ loss in peripheral  vision. This could indicate a retinal tear, hole or detachment that could lead to permanent vision loss if not treated.      The effects of the dilating drops last for 4- 6 hours.  You will be more sensitive to light and vision will be blurry up close.  Mydriatic sunglasses were given if needed.      Wilfrido Greco O.D.  [address]  [place]  [address]    [phone]

## 2019-11-11 DIAGNOSIS — N94.6 DYSMENORRHEA: ICD-10-CM

## 2019-12-11 ENCOUNTER — PRE VISIT (OUTPATIENT)
Dept: PEDIATRICS | Facility: CLINIC | Age: 29
End: 2019-12-11

## 2019-12-11 NOTE — PROGRESS NOTES
Pre-Visit Planning     Future Appointments   Date Time Provider Department Center   12/13/2019  8:00 AM Araceli Leyva, APRN CNP [facility]     Arrival Time for this Appointment:  8:00 AM   Appointment Notes for this encounter:      Physical - including pap, etc.  refill ocp please  Former patient of Dr. Helder Diop before he retired    Questionnaires Reviewed/Assigned  Additional questionnaires assigned - PHQ-2     Patient preferred phone number: [phone]    Unable to reach. Left voicemail. Advised patient to call clinic back at [phone] with any questions, concerns, or if they need to reschedule.    Parish Alexander, EMT at 1:50 PM on December 11, 2019   Clinic Health Guide   [phone]

## 2019-12-11 NOTE — TELEPHONE ENCOUNTER
Called pt, no answer. LVM to call clinic back at [phone] with any questions, concerns, or if they need to reschedule.    Parish Alexander, EMT at 2:00 PM on December 11, 2019   [facility] Health Guide   [phone]

## 2019-12-11 NOTE — PROGRESS NOTES
"   SUBJECTIVE:   CC: Connie Bhakta is an 29 year old woman who presents for preventive health visit.     History of Present Illness        She eats 2-3 servings of fruits and vegetables daily.She consumes 0 sweetened beverage(s) daily.She is missing 1 dose(s) of medications per week.  She is not taking prescribed medications regularly due to remembering to take.    {Add if <65 person on Medicare  - Required Questions (Optional):525150}  {Outside tests to abstract? :625842}    {additional problems to add (Optional):794563}    Today's PHQ-2 Score:   PHQ-2 ( 1999 Pfizer) 12/5/2017   Q1: Little interest or pleasure in doing things 0   Q2: Feeling down, depressed or hopeless 0   PHQ-2 Score 0   Q1: Little interest or pleasure in doing things -   Q2: Feeling down, depressed or hopeless -   PHQ-2 Score -     Abuse: Current or Past(Physical, Sexual or Emotional)- { :584033}  Do you feel safe in your environment? { :705639}    Social History     Tobacco Use     Smoking status: Never Smoker     Smokeless tobacco: Never Used   Substance Use Topics     Alcohol use: Yes     Alcohol/week: 0.0 standard drinks     Comment: once a week     {Rooming Staff- Complete this question if Prescreen response is not shown below for today's visit. If you drink alcohol do you typically have >3 drinks per day or >7 drinks per week? (Optional):720620}    Alcohol Use 11/6/2017   Prescreen: >3 drinks/day or >7 drinks/week? The patient does not drink >3 drinks per day nor >7 drinks per week.   {add AUDIT responses (Optional) (A score of 7 for adult men is an indication of hazardous drinking; a score of 8 or more is an indication of an alcohol use disorder.  A score of 7 or more for adult women is an indication of hazardous drinking or an alchohol use disorder):664621}    Reviewed orders with patient.  Reviewed health maintenance and updated orders accordingly - { :174530::\"Yes\"}  {Chronicprobdata (optional):156292}    {Mammo Decision Support " "(Optional):326800}    Pertinent mammograms are reviewed under the imaging tab.  History of abnormal Pap smear: { :497138}  PAP / HPV Latest Ref Rng & Units 12/10/2018 11/6/2017 9/16/2016   PAP - NIL NIL NIL   HPV 16 DNA NEG:Negative Positive(A) Positive(A) Positive(A)   HPV 18 DNA NEG:Negative Negative Negative Negative   OTHER HR HPV NEG:Negative Negative Negative Negative     Reviewed and updated as needed this visit by clinical staff         Reviewed and updated as needed this visit by Provider        {HISTORY OPTIONS (Optional):043264}    Review of Systems  {FEMALE ROS (Optional):099124}     OBJECTIVE:   There were no vitals taken for this visit.  Physical Exam  {Exam Choices (Optional):442134}    {Diagnostic Test Results (Optional):168052::\"Diagnostic Test Results:\",\"Labs reviewed in Epic\"}    ASSESSMENT/PLAN:   {Diag Picklist:523451}    COUNSELING:  {FEMALE COUNSELING MESSAGES:719539::\"Reviewed preventive health counseling, as reflected in patient instructions\"}    Estimated body mass index is 29.52 kg/m  as calculated from the following:    Height as of 12/10/18: 1.626 m (5' 4\").    Weight as of 1/9/19: 78 kg (172 lb).    {Weight Management Plan (ACO) Complete if BMI is abnormal-  Ages 18-64  BMI >24.9.  Age 65+ with BMI <23 or >30 (Optional):537476}     reports that she has never smoked. She has never used smokeless tobacco.  {Tobacco Cessation -- Complete if patient is a smoker (Optional):680504}    Counseling Resources:  ATP IV Guidelines  Pooled Cohorts Equation Calculator  Breast Cancer Risk Calculator  FRAX Risk Assessment  ICSI Preventive Guidelines  Dietary Guidelines for Americans, 2010  USDA's MyPlate  ASA Prophylaxis  Lung CA Screening    Araceli Gupta, NORBERTO [facility] NUNU  "

## 2019-12-13 ENCOUNTER — OFFICE VISIT (OUTPATIENT)
Dept: PEDIATRICS | Facility: CLINIC | Age: 29
End: 2019-12-13
Payer: COMMERCIAL

## 2019-12-13 VITALS
OXYGEN SATURATION: 97 % | HEART RATE: 92 BPM | SYSTOLIC BLOOD PRESSURE: 105 MMHG | BODY MASS INDEX: 28.49 KG/M2 | TEMPERATURE: 98.1 F | DIASTOLIC BLOOD PRESSURE: 67 MMHG | WEIGHT: 166 LBS

## 2019-12-13 DIAGNOSIS — N94.6 DYSMENORRHEA: ICD-10-CM

## 2019-12-13 DIAGNOSIS — H73.91 TM (TYMPANIC MEMBRANE DISORDER), RIGHT: ICD-10-CM

## 2019-12-13 DIAGNOSIS — B97.7 HIGH RISK HPV INFECTION: ICD-10-CM

## 2019-12-13 DIAGNOSIS — Z00.00 ROUTINE GENERAL MEDICAL EXAMINATION AT A HEALTH CARE FACILITY: Primary | ICD-10-CM

## 2019-12-13 DIAGNOSIS — Z80.0 FAMILY HISTORY OF RECTAL CANCER: ICD-10-CM

## 2019-12-13 DIAGNOSIS — Z12.4 SCREENING FOR MALIGNANT NEOPLASM OF CERVIX: ICD-10-CM

## 2019-12-13 DIAGNOSIS — F41.9 ANXIETY: ICD-10-CM

## 2019-12-13 LAB
CHOLEST SERPL-MCNC: 215 MG/DL
GLUCOSE SERPL-MCNC: 78 MG/DL (ref 70–99)
HDLC SERPL-MCNC: 53 MG/DL
LDLC SERPL CALC-MCNC: 138 MG/DL
NONHDLC SERPL-MCNC: 162 MG/DL
TRIGL SERPL-MCNC: 122 MG/DL

## 2019-12-13 PROCEDURE — 99385 PREV VISIT NEW AGE 18-39: CPT | Performed by: NURSE PRACTITIONER

## 2019-12-13 PROCEDURE — 36415 COLL VENOUS BLD VENIPUNCTURE: CPT | Performed by: NURSE PRACTITIONER

## 2019-12-13 PROCEDURE — 82947 ASSAY GLUCOSE BLOOD QUANT: CPT | Performed by: NURSE PRACTITIONER

## 2019-12-13 PROCEDURE — 80061 LIPID PANEL: CPT | Performed by: NURSE PRACTITIONER

## 2019-12-13 PROCEDURE — 87624 HPV HI-RISK TYP POOLED RSLT: CPT | Performed by: NURSE PRACTITIONER

## 2019-12-13 PROCEDURE — G0145 SCR C/V CYTO,THINLAYER,RESCR: HCPCS | Performed by: NURSE PRACTITIONER

## 2019-12-13 NOTE — PROGRESS NOTES
SUBJECTIVE:   CC: Connie Bhakta is an 29 year old woman who presents for preventive health visit.     Healthy Habits:    Do you get at least three servings of calcium containing foods daily (dairy, green leafy vegetables, etc.)? Yes, also takes Vit. D.     Amount of exercise or daily activities, outside of work: 6 day(s) per week an hour a day     Problems taking medications regularly No    Medication side effects: Yes zoloft patient gets heaertburn not sure if it has to do with food but noticeable after taking it.      Have you had an eye exam in the past two years? yes    Do you see a dentist twice per year? yes    Do you have sleep apnea, excessive snoring or daytime drowsiness?no    History of nondrainign eustation tube, has had surgeries to correct, needs referral to ENT.     History of anxiety, is on zoloft symptoms are well managed. She has been on this med x 2 yrs. It can cause heartburn on occasion.     MIMI-7 SCORE 11/6/2017 12/5/2017   Total Score 18 2       Is on ocp, likes it. No new partners since last screening.       Today's PHQ-2 Score:   PHQ-2 ( 1999 Pfizer) 12/12/2019 12/12/2019   Q1: Little interest or pleasure in doing things 0 0   Q2: Feeling down, depressed or hopeless 0 0   PHQ-2 Score 0 0   Q1: Little interest or pleasure in doing things Not at all Not at all   Q2: Feeling down, depressed or hopeless Not at all Not at all   PHQ-2 Score 0 0       Abuse: Current or Past(Physical, Sexual or Emotional)- No  Do you feel safe in your environment? Yes        Social History     Tobacco Use     Smoking status: Never Smoker     Smokeless tobacco: Never Used   Substance Use Topics     Alcohol use: Yes     Alcohol/week: 0.0 standard drinks     Comment: once a month     If you drink alcohol do you typically have >3 drinks per day or >7 drinks per week? No                     Reviewed orders with patient.  Reviewed health maintenance and updated orders accordingly - Yes  Lab work is in  process    Mammogram not appropriate for this patient based on age.    Pertinent mammograms are reviewed under the imaging tab.  History of abnormal Pap smear: NO - age 30-65 PAP every 5 years with negative HPV co-testing recommended  PAP / HPV Latest Ref Rng & Units 12/10/2018 11/6/2017 9/16/2016   PAP - NIL NIL NIL   HPV 16 DNA NEG:Negative Positive(A) Positive(A) Positive(A)   HPV 18 DNA NEG:Negative Negative Negative Negative   OTHER HR HPV NEG:Negative Negative Negative Negative     Reviewed and updated as needed this visit by clinical staff  Tobacco  Allergies  Med Hx  Surg Hx  Fam Hx  Soc Hx        Reviewed and updated as needed this visit by Provider            ROS:  CONSTITUTIONAL: NEGATIVE for fever, chills, change in weight  INTEGUMENTARU/SKIN: NEGATIVE for worrisome rashes, moles or lesions  EYES: NEGATIVE for vision changes or irritation  ENT: NEGATIVE for ear, mouth and throat problems  RESP: NEGATIVE for significant cough or SOB  BREAST: NEGATIVE for masses, tenderness or discharge  CV: NEGATIVE for chest pain, palpitations or peripheral edema  GI: NEGATIVE for nausea, abdominal pain, heartburn, or change in bowel habits  : NEGATIVE for unusual urinary or vaginal symptoms. Periods are regular.  MUSCULOSKELETAL: NEGATIVE for significant arthralgias or myalgia  NEURO: NEGATIVE for weakness, dizziness or paresthesias  PSYCHIATRIC: NEGATIVE for changes in mood or affect    OBJECTIVE:   /67 (BP Location: Right arm, Cuff Size: Adult Regular)   Pulse 92   Temp 98.1  F (36.7  C) (Oral)   Wt 75.3 kg (166 lb)   LMP 12/05/2019   SpO2 97%   BMI 28.49 kg/m    EXAM:  GENERAL: healthy, alert and no distress  EYES: Eyes grossly normal to inspection, PERRL and conjunctivae and sclerae normal  HENT: ear canals and TM's normal, nose and mouth without ulcers or lesions  NECK: no adenopathy, no asymmetry, masses, or scars and thyroid normal to palpation  RESP: lungs clear to auscultation - no rales,  "rhonchi or wheezes  CV: regular rate and rhythm, normal S1 S2, no S3 or S4, no murmur, click or rub, no peripheral edema and peripheral pulses strong  ABDOMEN: soft, nontender, no hepatosplenomegaly, no masses and bowel sounds normal   (female): normal female external genitalia, normal urethral meatus, vaginal mucosa, normal cervix/adnexa/uterus without masses or discharge  MS: no gross musculoskeletal defects noted, no edema  PSYCH: mentation appears normal, affect normal/bright      ASSESSMENT/PLAN:   1. Routine general medical examination at a health care facility    - Lipid Profile  - Glucose    2. Screening for malignant neoplasm of cervix    - Pap imaged thin layer screen with HPV - recommended age 30 - 65 years (select HPV order below)  - HPV High Risk Types DNA Cervical    3. High risk HPV infection      4. Anxiety  Well managed per zoloft  - sertraline (ZOLOFT) 50 MG tablet; Take 1 tablet (50 mg) by mouth daily  Dispense: 90 tablet; Refill: 3    5. Dysmenorrhea  Stable, declines STI screening  - norgestrel-ethinyl estradiol (CRYSELLE-28) 0.3-30 MG-MCG tablet; Take 1 tablet by mouth daily  Dispense: 84 tablet; Refill: prn    6. Family history of rectal cancer  Will start colonoscopy at age 44    7. Tm (tympanic membrane disorder), right  Has lonstanding anatomical issues and needs to establish with new ENT.   - OTOLARYNGOLOGY REFERRAL    COUNSELING:   Reviewed preventive health counseling, as reflected in patient instructions  Special attention given to:        Regular exercise       Healthy diet/nutrition       Contraception       Family planning       Osteoporosis Prevention/Bone Health       Safe sex practices/STD prevention    Estimated body mass index is 28.49 kg/m  as calculated from the following:    Height as of 12/10/18: 1.626 m (5' 4\").    Weight as of this encounter: 75.3 kg (166 lb).         reports that she has never smoked. She has never used smokeless tobacco.      Counseling Resources:  ATP " IV Guidelines  Pooled Cohorts Equation Calculator  Breast Cancer Risk Calculator  FRAX Risk Assessment  ICSI Preventive Guidelines  Dietary Guidelines for Americans, 2010  [employer]'s MyPlate  ASA Prophylaxis  Lung CA Screening    NORBERTO [facility] NUNU  Answers for HPI/ROS submitted by the patient on 12/12/2019   Chronic problems general questions HPI Form  How many servings of fruits and vegetables do you eat daily?: 2-3  On average, how many sweetened beverages do you drink each day (Examples: soda, juice, sweet tea, etc.  Do NOT count diet or artificially sweetened beverages)?: 0  How many days per week do you miss taking your medication?: 1  What makes it hard for you to take your medication every day?: remembering to take

## 2019-12-18 LAB
COPATH REPORT: NORMAL
PAP: NORMAL

## 2020-02-12 ENCOUNTER — OFFICE VISIT (OUTPATIENT)
Dept: OBGYN | Facility: CLINIC | Age: 30
End: 2020-02-12
Payer: COMMERCIAL

## 2020-02-12 VITALS — WEIGHT: 168 LBS | HEIGHT: 64 IN | BODY MASS INDEX: 28.68 KG/M2

## 2020-02-12 DIAGNOSIS — Z01.812 PRE-PROCEDURE LAB EXAM: ICD-10-CM

## 2020-02-12 DIAGNOSIS — Z11.3 SCREEN FOR STD (SEXUALLY TRANSMITTED DISEASE): ICD-10-CM

## 2020-02-12 DIAGNOSIS — R87.810 CERVICAL HIGH RISK HPV (HUMAN PAPILLOMAVIRUS) TEST POSITIVE: Primary | ICD-10-CM

## 2020-02-12 LAB — HCG UR QL: NEGATIVE

## 2020-02-12 PROCEDURE — 81025 URINE PREGNANCY TEST: CPT | Performed by: OBSTETRICS & GYNECOLOGY

## 2020-02-12 PROCEDURE — 87591 N.GONORRHOEAE DNA AMP PROB: CPT | Performed by: OBSTETRICS & GYNECOLOGY

## 2020-02-12 PROCEDURE — 57454 BX/CURETT OF CERVIX W/SCOPE: CPT | Performed by: OBSTETRICS & GYNECOLOGY

## 2020-02-12 PROCEDURE — 88305 TISSUE EXAM BY PATHOLOGIST: CPT | Performed by: OBSTETRICS & GYNECOLOGY

## 2020-02-12 PROCEDURE — 87491 CHLMYD TRACH DNA AMP PROBE: CPT | Performed by: OBSTETRICS & GYNECOLOGY

## 2020-02-12 ASSESSMENT — MIFFLIN-ST. JEOR: SCORE: 1467.04

## 2020-02-12 NOTE — NURSING NOTE
"Chief Complaint   Patient presents with     Colposcopy     +HPV 16       Initial Ht 1.626 m (5' 4\")   Wt 76.2 kg (168 lb)   LMP 2020 (Exact Date)   BMI 28.84 kg/m   Estimated body mass index is 28.84 kg/m  as calculated from the following:    Height as of this encounter: 1.626 m (5' 4\").    Weight as of this encounter: 76.2 kg (168 lb).  BP completed using cuff size: regular    Questioned patient about current smoking habits.  Pt. has never smoked.          The following HM Due: NONE    Evan Cotton CMA              "

## 2020-02-13 LAB
C TRACH DNA SPEC QL NAA+PROBE: NEGATIVE
C TRACH DNA SPEC QL NAA+PROBE: NEGATIVE
COPATH REPORT: NORMAL
N GONORRHOEA DNA SPEC QL NAA+PROBE: NEGATIVE
N GONORRHOEA DNA SPEC QL NAA+PROBE: NEGATIVE
SPECIMEN SOURCE: NORMAL

## 2020-02-14 PROBLEM — N87.1 MODERATE DYSPLASIA OF CERVIX (CIN II): Status: ACTIVE | Noted: 2020-02-12

## 2020-02-14 NOTE — RESULT ENCOUNTER NOTE
Please call patient with colposcopic biopsy results showing high-grade dysplasia that is located in areas that require a Loop Electrode Excision Procedure (often referred to as a LEEP) to adequately remove the affected areas and also to look for anything higher grade than has already been determined by the initial biopsies.  She should get this scheduled in the office the week following a menstrual cycle in the next 1-2 months.  Assist her in scheduling.    Domenic Leonard MD

## 2020-03-06 ENCOUNTER — OFFICE VISIT (OUTPATIENT)
Dept: OBGYN | Facility: CLINIC | Age: 30
End: 2020-03-06
Payer: COMMERCIAL

## 2020-03-06 VITALS — WEIGHT: 166 LBS | BODY MASS INDEX: 28.49 KG/M2 | SYSTOLIC BLOOD PRESSURE: 108 MMHG | DIASTOLIC BLOOD PRESSURE: 62 MMHG

## 2020-03-06 DIAGNOSIS — N87.1 MODERATE DYSPLASIA OF CERVIX (CIN II): Primary | ICD-10-CM

## 2020-03-06 DIAGNOSIS — R87.612 PAPANICOLAOU SMEAR OF CERVIX WITH LOW GRADE SQUAMOUS INTRAEPITHELIAL LESION (LGSIL): ICD-10-CM

## 2020-03-06 DIAGNOSIS — Z01.812 PRE-PROCEDURE LAB EXAM: ICD-10-CM

## 2020-03-06 DIAGNOSIS — R87.810 CERVICAL HIGH RISK HPV (HUMAN PAPILLOMAVIRUS) TEST POSITIVE: ICD-10-CM

## 2020-03-06 LAB — HCG, QUAL URINE: NEGATIVE

## 2020-03-06 PROCEDURE — 88305 TISSUE EXAM BY PATHOLOGIST: CPT | Performed by: OBSTETRICS & GYNECOLOGY

## 2020-03-06 PROCEDURE — 84703 CHORIONIC GONADOTROPIN ASSAY: CPT | Performed by: OBSTETRICS & GYNECOLOGY

## 2020-03-06 PROCEDURE — 88307 TISSUE EXAM BY PATHOLOGIST: CPT | Performed by: OBSTETRICS & GYNECOLOGY

## 2020-03-06 PROCEDURE — 57461 CONZ OF CERVIX W/SCOPE LEEP: CPT | Performed by: OBSTETRICS & GYNECOLOGY

## 2020-03-06 NOTE — NURSING NOTE
"Chief Complaint   Patient presents with     Leep     DIPIKA 2       Initial /62 (BP Location: Right arm, Patient Position: Sitting, Cuff Size: Adult Large)   Wt 75.3 kg (166 lb)   LMP 2020   BMI 28.49 kg/m   Estimated body mass index is 28.49 kg/m  as calculated from the following:    Height as of 20: 1.626 m (5' 4\").    Weight as of this encounter: 75.3 kg (166 lb).  BP completed using cuff size: large    Questioned patient about current smoking habits.  Pt. has never smoked.          The following HM Due: NONE    Evan Cotton CMA                "

## 2020-03-10 LAB — COPATH REPORT: NORMAL

## 2020-12-20 ASSESSMENT — ENCOUNTER SYMPTOMS
FEVER: 0
PARESTHESIAS: 0
FREQUENCY: 0
JOINT SWELLING: 0
CHILLS: 0
NAUSEA: 0
HEMATURIA: 0
NERVOUS/ANXIOUS: 0
PALPITATIONS: 0
DIZZINESS: 0
SORE THROAT: 0
EYE PAIN: 0
COUGH: 0
HEADACHES: 0
DYSURIA: 0
SHORTNESS OF BREATH: 0
DIARRHEA: 0
BREAST MASS: 0
CONSTIPATION: 0
ARTHRALGIAS: 0
WEAKNESS: 0
MYALGIAS: 0
HEMATOCHEZIA: 0
HEARTBURN: 0
ABDOMINAL PAIN: 0

## 2020-12-22 ENCOUNTER — OFFICE VISIT (OUTPATIENT)
Dept: PEDIATRICS | Facility: CLINIC | Age: 30
End: 2020-12-22
Payer: COMMERCIAL

## 2020-12-22 VITALS
RESPIRATION RATE: 14 BRPM | OXYGEN SATURATION: 98 % | SYSTOLIC BLOOD PRESSURE: 108 MMHG | BODY MASS INDEX: 28.39 KG/M2 | HEART RATE: 82 BPM | WEIGHT: 166.3 LBS | DIASTOLIC BLOOD PRESSURE: 70 MMHG | TEMPERATURE: 98.2 F | HEIGHT: 64 IN

## 2020-12-22 DIAGNOSIS — F41.9 ANXIETY: ICD-10-CM

## 2020-12-22 DIAGNOSIS — Z11.59 NEED FOR HEPATITIS C SCREENING TEST: ICD-10-CM

## 2020-12-22 DIAGNOSIS — H73.91 TM (TYMPANIC MEMBRANE DISORDER), RIGHT: ICD-10-CM

## 2020-12-22 DIAGNOSIS — Z00.00 ROUTINE GENERAL MEDICAL EXAMINATION AT A HEALTH CARE FACILITY: Primary | ICD-10-CM

## 2020-12-22 DIAGNOSIS — Z23 NEED FOR VACCINATION: ICD-10-CM

## 2020-12-22 DIAGNOSIS — N94.6 DYSMENORRHEA: ICD-10-CM

## 2020-12-22 PROCEDURE — 90471 IMMUNIZATION ADMIN: CPT | Performed by: NURSE PRACTITIONER

## 2020-12-22 PROCEDURE — 90715 TDAP VACCINE 7 YRS/> IM: CPT | Performed by: NURSE PRACTITIONER

## 2020-12-22 PROCEDURE — 99395 PREV VISIT EST AGE 18-39: CPT | Mod: 25 | Performed by: NURSE PRACTITIONER

## 2020-12-22 RX ORDER — NORGESTREL-ETHINYL ESTRADIOL 0.3-0.03MG
1 TABLET ORAL DAILY
Qty: 84 TABLET | Status: SHIPPED | OUTPATIENT
Start: 2020-12-22 | End: 2021-07-15

## 2020-12-22 ASSESSMENT — ENCOUNTER SYMPTOMS
EYE PAIN: 0
JOINT SWELLING: 0
ARTHRALGIAS: 0
DIARRHEA: 0
PALPITATIONS: 0
HEMATURIA: 0
NERVOUS/ANXIOUS: 0
DYSURIA: 0
DIZZINESS: 0
FREQUENCY: 0
PARESTHESIAS: 0
SORE THROAT: 0
HEARTBURN: 0
HEMATOCHEZIA: 0
NAUSEA: 0
BREAST MASS: 0
HEADACHES: 0
SHORTNESS OF BREATH: 0
WEAKNESS: 0
MYALGIAS: 0
FEVER: 0
CONSTIPATION: 0
COUGH: 0
CHILLS: 0
ABDOMINAL PAIN: 0

## 2020-12-22 ASSESSMENT — MIFFLIN-ST. JEOR: SCORE: 1454.33

## 2020-12-22 NOTE — PROGRESS NOTES
SUBJECTIVE:   CC: Connie Bhakta is an 30 year old woman who presents for preventive health visit.     Patient has been advised of split billing requirements and indicates understanding: Yes  Healthy Habits:     Getting at least 3 servings of Calcium per day:  Yes    Bi-annual eye exam:  Yes    Dental care twice a year:  Yes    Sleep apnea or symptoms of sleep apnea:  None    Diet:  Regular (no restrictions)    Frequency of exercise:  2-3 days/week    Duration of exercise:  30-45 minutes    Taking medications regularly:  Yes    Medication side effects:  Not applicable    PHQ-2 Total Score: 0    Additional concerns today:  No    Concerns today: none    States last eye exam was June 2019  Patient had colposcopy and  states next pap due March 2021    History of anxiety, is on zolfot, doing well.     ocp is going well, no new partner since last testing.     Long history of recurrent otitis external. Has a hole in TM, had been followed by ENT, but would like second opinion.     Today's PHQ-2 Score:   PHQ-2 ( 1999 Pfizer) 12/20/2020   Q1: Little interest or pleasure in doing things 0   Q2: Feeling down, depressed or hopeless 0   PHQ-2 Score 0   Q1: Little interest or pleasure in doing things Not at all   Q2: Feeling down, depressed or hopeless Not at all   PHQ-2 Score 0     Abuse: Current or Past (Physical, Sexual or Emotional) - No  Do you feel safe in your environment? Yes    Social History     Tobacco Use     Smoking status: Never Smoker     Smokeless tobacco: Never Used   Substance Use Topics     Alcohol use: Yes     Alcohol/week: 0.0 standard drinks     Comment: once a month     Alcohol Use 12/20/2020   Prescreen: >3 drinks/day or >7 drinks/week? No   Prescreen: >3 drinks/day or >7 drinks/week? -     Reviewed orders with patient.  Reviewed health maintenance and updated orders accordingly - Yes  Lab work is in process    Mammogram not appropriate for this patient based on age.    Pertinent mammograms are  "reviewed under the imaging tab.  History of abnormal Pap smear: YES - updated in Problem List and Health Maintenance accordingly  PAP / HPV Latest Ref Rng & Units 12/13/2019 12/10/2018 11/6/2017   PAP - NIL NIL NIL   HPV 16 DNA NEG:Negative Positive(A) Positive(A) Positive(A)   HPV 18 DNA NEG:Negative Negative Negative Negative   OTHER HR HPV NEG:Negative Negative Negative Negative     Reviewed and updated as needed this visit by clinical staff  Tobacco  Allergies  Meds   Med Hx  Surg Hx  Fam Hx  Soc Hx        Reviewed and updated as needed this visit by Provider    Meds                 Review of Systems   Constitutional: Negative for chills and fever.   HENT: Negative for congestion, ear pain, hearing loss and sore throat.    Eyes: Negative for pain and visual disturbance.   Respiratory: Negative for cough and shortness of breath.    Cardiovascular: Negative for chest pain, palpitations and peripheral edema.   Gastrointestinal: Negative for abdominal pain, constipation, diarrhea, heartburn, hematochezia and nausea.   Breasts:  Negative for tenderness, breast mass and discharge.   Genitourinary: Negative for dysuria, frequency, genital sores, hematuria, pelvic pain, urgency, vaginal bleeding and vaginal discharge.   Musculoskeletal: Negative for arthralgias, joint swelling and myalgias.   Skin: Negative for rash.   Neurological: Negative for dizziness, weakness, headaches and paresthesias.   Psychiatric/Behavioral: Negative for mood changes. The patient is not nervous/anxious.         OBJECTIVE:   /70 (BP Location: Right arm, Cuff Size: Adult Regular)   Pulse 82   Temp 98.2  F (36.8  C) (Tympanic)   Resp 14   Ht 1.626 m (5' 4\")   Wt 75.4 kg (166 lb 4.8 oz)   LMP 12/09/2020 (Exact Date)   SpO2 98%   BMI 28.55 kg/m    Physical Exam  GENERAL: healthy, alert and no distress  EYES: Eyes grossly normal to inspection, PERRL and conjunctivae and sclerae normal  HENT: normal cephalic/atraumatic, right " "ear: gray in color with hole and no discharge, left ear: normal: no effusions, no erythema, normal landmarks, nose and mouth without ulcers or lesions, oropharynx clear and oral mucous membranes moist  NECK: no adenopathy, no asymmetry, masses, or scars and thyroid normal to palpation  RESP: lungs clear to auscultation - no rales, rhonchi or wheezes  CV: regular rate and rhythm, normal S1 S2, no S3 or S4, no murmur, click or rub, no peripheral edema and peripheral pulses strong  MS: no gross musculoskeletal defects noted, no edema  SKIN: no suspicious lesions or rashes  PSYCH: mentation appears normal, affect normal/bright      ASSESSMENT/PLAN:   1. Routine general medical examination at a health care facility      2. Need for hepatitis C screening test  declines    3. Need for vaccination    - TDAP VACCINE (Adacel, Boostrix)  [[id number]]    4. Anxiety  Stable on current regime  - sertraline (ZOLOFT) 50 MG tablet; Take 1 tablet (50 mg) by mouth daily  Dispense: 90 tablet; Refill: 1    5. Dysmenorrhea  stable  - norgestrel-ethinyl estradiol (CRYSELLE-28) 0.3-30 MG-MCG tablet; Take 1 tablet by mouth daily  Dispense: 84 tablet; Refill: prn    6. Tm (tympanic membrane disorder), right  Will follow-up with ent  - OTOLARYNGOLOGY REFERRAL    Patient has been advised of split billing requirements and indicates understanding: No  COUNSELING:  Reviewed preventive health counseling, as reflected in patient instructions  Special attention given to:        Regular exercise       Healthy diet/nutrition       Contraception       Folic Acid       Osteoporosis prevention/bone health       Safe sex practices/STD prevention    Estimated body mass index is 28.55 kg/m  as calculated from the following:    Height as of this encounter: 1.626 m (5' 4\").    Weight as of this encounter: 75.4 kg (166 lb 4.8 oz).        She reports that she has never smoked. She has never used smokeless tobacco.      Counseling Resources:  ATP IV " Guidelines  Pooled Cohorts Equation Calculator  Breast Cancer Risk Calculator  BRCA-Related Cancer Risk Assessment: FHS-7 Tool  FRAX Risk Assessment  ICSI Preventive Guidelines  Dietary Guidelines for Americans, 2010  USDA's MyPlate  ASA Prophylaxis  Lung CA Screening    Araceli Gupta, NORBERTO CNP  [facility]

## 2021-02-18 ENCOUNTER — MYC MEDICAL ADVICE (OUTPATIENT)
Dept: PEDIATRICS | Facility: CLINIC | Age: 31
End: 2021-02-18

## 2021-02-19 ENCOUNTER — PATIENT OUTREACH (OUTPATIENT)
Dept: INTERNAL MEDICINE | Facility: CLINIC | Age: 31
End: 2021-02-19

## 2021-03-02 ENCOUNTER — OFFICE VISIT (OUTPATIENT)
Dept: PEDIATRICS | Facility: CLINIC | Age: 31
End: 2021-03-02
Payer: COMMERCIAL

## 2021-03-02 VITALS
HEIGHT: 64 IN | BODY MASS INDEX: 29.31 KG/M2 | WEIGHT: 171.7 LBS | TEMPERATURE: 98.4 F | HEART RATE: 88 BPM | OXYGEN SATURATION: 97 % | SYSTOLIC BLOOD PRESSURE: 104 MMHG | DIASTOLIC BLOOD PRESSURE: 60 MMHG

## 2021-03-02 DIAGNOSIS — Z12.4 ENCOUNTER FOR PAPANICOLAOU SMEAR FOR CERVICAL CANCER SCREENING: Primary | ICD-10-CM

## 2021-03-02 DIAGNOSIS — Z87.42 HISTORY OF ABNORMAL CERVICAL PAP SMEAR: ICD-10-CM

## 2021-03-02 PROCEDURE — G0145 SCR C/V CYTO,THINLAYER,RESCR: HCPCS | Performed by: NURSE PRACTITIONER

## 2021-03-02 PROCEDURE — 99213 OFFICE O/P EST LOW 20 MIN: CPT | Performed by: NURSE PRACTITIONER

## 2021-03-02 PROCEDURE — 87624 HPV HI-RISK TYP POOLED RSLT: CPT | Performed by: NURSE PRACTITIONER

## 2021-03-02 ASSESSMENT — MIFFLIN-ST. JEOR: SCORE: 1478.83

## 2021-03-02 NOTE — PROGRESS NOTES
"    Assessment & Plan     Encounter for Papanicolaou smear for cervical cancer screening  History of abnormal cervical Pap smear  - Pap imaged thin layer screen with HPV - recommended age 30 - 65 years (select HPV order below)  - HPV High Risk Types DNA Cervical      No follow-ups on file.    Jeanette Luna NP  [facility] NUNU Rosales is a 30 year old who presents for the following health issues     History of Present Illness       She eats 2-3 servings of fruits and vegetables daily.She consumes 1 sweetened beverage(s) daily.She exercises with enough effort to increase her heart rate 30 to 60 minutes per day.  She exercises with enough effort to increase her heart rate 5 days per week. She is missing 1 dose(s) of medications per week.  She is not taking prescribed medications regularly due to remembering to take.       Here for pap only.  Hx of abnormal pap with LEEP March 2020.    Currently taking OCP.    Review of Systems   Otherwise ROS is negative except as stated above.        Objective    /60 (BP Location: Right arm, Patient Position: Chair, Cuff Size: Adult Regular)   Pulse 88   Temp 98.4  F (36.9  C) (Tympanic)   Ht 1.626 m (5' 4\")   Wt 77.9 kg (171 lb 11.2 oz)   LMP 02/04/2021   SpO2 97%   BMI 29.47 kg/m    Body mass index is 29.47 kg/m .  Physical Exam   GENERAL: healthy, alert and no distress   (female): normal female external genitalia, normal urethral meatus, vaginal mucosa, normal cervix discharge    No results found for any visits on 03/02/21.            "

## 2021-03-04 LAB
COPATH REPORT: NORMAL
PAP: NORMAL

## 2021-03-08 ENCOUNTER — PATIENT OUTREACH (OUTPATIENT)
Dept: PEDIATRICS | Facility: CLINIC | Age: 31
End: 2021-03-08

## 2021-03-08 DIAGNOSIS — R87.810 CERVICAL HIGH RISK HPV (HUMAN PAPILLOMAVIRUS) TEST POSITIVE: ICD-10-CM

## 2021-03-08 DIAGNOSIS — R87.612 PAPANICOLAOU SMEAR OF CERVIX WITH LOW GRADE SQUAMOUS INTRAEPITHELIAL LESION (LGSIL): ICD-10-CM

## 2021-03-08 DIAGNOSIS — N87.1 MODERATE DYSPLASIA OF CERVIX (CIN II): ICD-10-CM

## 2021-03-19 ENCOUNTER — OFFICE VISIT (OUTPATIENT)
Dept: OBGYN | Facility: CLINIC | Age: 31
End: 2021-03-19
Payer: COMMERCIAL

## 2021-03-19 VITALS — HEIGHT: 64 IN | BODY MASS INDEX: 29.71 KG/M2 | WEIGHT: 174 LBS

## 2021-03-19 DIAGNOSIS — R87.612 PAPANICOLAOU SMEAR OF CERVIX WITH LOW GRADE SQUAMOUS INTRAEPITHELIAL LESION (LGSIL): ICD-10-CM

## 2021-03-19 DIAGNOSIS — Z01.812 PRE-PROCEDURE LAB EXAM: ICD-10-CM

## 2021-03-19 DIAGNOSIS — N87.1 MODERATE DYSPLASIA OF CERVIX (CIN II): ICD-10-CM

## 2021-03-19 DIAGNOSIS — R87.810 CERVICAL HIGH RISK HPV (HUMAN PAPILLOMAVIRUS) TEST POSITIVE: Primary | ICD-10-CM

## 2021-03-19 DIAGNOSIS — Z11.3 SCREEN FOR STD (SEXUALLY TRANSMITTED DISEASE): ICD-10-CM

## 2021-03-19 LAB — HCG UR QL: NEGATIVE

## 2021-03-19 PROCEDURE — 57454 BX/CURETT OF CERVIX W/SCOPE: CPT | Performed by: OBSTETRICS & GYNECOLOGY

## 2021-03-19 PROCEDURE — 88305 TISSUE EXAM BY PATHOLOGIST: CPT | Performed by: PATHOLOGY

## 2021-03-19 PROCEDURE — 87591 N.GONORRHOEAE DNA AMP PROB: CPT | Performed by: OBSTETRICS & GYNECOLOGY

## 2021-03-19 PROCEDURE — 87491 CHLMYD TRACH DNA AMP PROBE: CPT | Performed by: OBSTETRICS & GYNECOLOGY

## 2021-03-19 PROCEDURE — 81025 URINE PREGNANCY TEST: CPT | Performed by: OBSTETRICS & GYNECOLOGY

## 2021-03-19 ASSESSMENT — MIFFLIN-ST. JEOR: SCORE: 1489.26

## 2021-03-19 NOTE — NURSING NOTE
"Chief Complaint   Patient presents with     Colposcopy     + HPV 16       Initial Ht 1.626 m (5' 4\")   Wt 78.9 kg (174 lb)   LMP 2021 (Exact Date)   BMI 29.87 kg/m   Estimated body mass index is 29.87 kg/m  as calculated from the following:    Height as of this encounter: 1.626 m (5' 4\").    Weight as of this encounter: 78.9 kg (174 lb).  BP completed using cuff size: regular    Questioned patient about current smoking habits.  Pt. has never smoked.          The following HM Due: NONE    Evan Cotton CMA                "

## 2021-03-19 NOTE — PROGRESS NOTES
Colposcopy Note    Past History:     Patient's last menstrual period was 2021 (exact date).  Patient is not pregnant.     Previous Abnormal Pap Hx:  2019 - Pap WNL, Pos HPV 16  2020 - Colpo EctoCx DIPIKA 2, ECC HGSIL  3/6/2020 - LEEP DIPIKA 1 on EctoCx LEEP only, EndoCx LEEP and ECC Neg  3/2/2021 - Pap-WNL, Pos HPV 16    Indications:  Encounter Diagnoses   Name Primary?     Cervical high risk HPV (human papillomavirus) test positive Yes     Moderate dysplasia of cervix (DIPIKA II)      Papanicolaou smear of cervix with low grade squamous intraepithelial lesion (LGSIL)      Pre-procedure lab exam        PROCEDURE:  The procedure was explained, and informed consent obtained. The patient was placed in the dorsal lithotomy position. A vaginal speculum was placed. A GC/Chlamydia culture was obtained. Dilute acetic acid was applied to cervix. The exocervix was visualized. The transformation zone was visualized satisfactorily. Colposcopy was done with white light and with the Abelardo light. Endocervical curettage was done. Biopsy done at the 10 o clock position(s) Colposcopy of vagina revealed: normal. The patient tolerated the procedure well. At the completion of the procedure, only scant bleeding was present. Hemostasis was achieved with Monsel's solution.    FINDINGS:  White epithelium @ 10 o clock position(s).  Punctation: absent  Mosaicism: absent    Physical Exam  Genitourinary:              ASSESSMENT:  Encounter Diagnoses   Name Primary?     Cervical high risk HPV (human papillomavirus) test positive Yes     Moderate dysplasia of cervix (DIPIKA II)      Papanicolaou smear of cervix with low grade squamous intraepithelial lesion (LGSIL)      Pre-procedure lab exam        PLAN:  If Bx shows DIPIKA 1 or less, follow-up with Ob/Gyn per ASCCP Guideline in 1 year for Pap & HPV cotest at next annual exam.     Procedure Planned:   If HGSIL, consider Laser vaporization.    Domenic Leonard MD

## 2021-03-21 LAB
C TRACH DNA SPEC QL NAA+PROBE: NEGATIVE
N GONORRHOEA DNA SPEC QL NAA+PROBE: NEGATIVE
SPECIMEN SOURCE: NORMAL
SPECIMEN SOURCE: NORMAL

## 2021-03-23 LAB — COPATH REPORT: NORMAL

## 2021-03-23 NOTE — RESULT ENCOUNTER NOTE
Please advise patient her colposcopic biopsies from her cervix showed no dysplasia nor cervical cancer.  She does not need anything else done at this time.  She only needs to follow up in 1 year for her annual exam, and at that time a Pap with HPV DNA test will be done again from her cervix to check to see if the cervix cells are normal and the HPV virus has been eradicated.  If so she will resume routine screening 3 years later.  She can let me know if she has any other questions.    Domenic Leonard MD

## 2021-05-03 ENCOUNTER — PATIENT OUTREACH (OUTPATIENT)
Dept: OBGYN | Facility: CLINIC | Age: 31
End: 2021-05-03
Payer: COMMERCIAL

## 2021-05-03 DIAGNOSIS — R87.810 CERVICAL HIGH RISK HPV (HUMAN PAPILLOMAVIRUS) TEST POSITIVE: ICD-10-CM

## 2021-05-03 DIAGNOSIS — N87.1 MODERATE DYSPLASIA OF CERVIX (CIN II): ICD-10-CM

## 2021-05-03 DIAGNOSIS — R87.612 PAPANICOLAOU SMEAR OF CERVIX WITH LOW GRADE SQUAMOUS INTRAEPITHELIAL LESION (LGSIL): ICD-10-CM

## 2021-06-01 VITALS — WEIGHT: 158 LBS | BODY MASS INDEX: 27.33 KG/M2

## 2021-06-16 NOTE — PROGRESS NOTES
Assessment:        URI       Plan:       Antitussives per orders  Beta-agonist inhaler per orders  OTC analgesics discussed  Recommended plenty of fluids  Discussed signs of worsening infection and when to follow-up with PCP if no symptom improvement.       Patient Instructions   You were seen today for an upper respiratory infection. This is likely due to a viral illness.    Symptom management:  - Get plenty of rest  - Avoid smoking and second hand smoke  - May take tylenol or ibuprofen for fever/discomfort  - Drink plenty of non-caffeinated fluids  - May use tessalon perles to help suppress the cough  - Albuterol inhaler may be used every 6 hours as needed for chest tightness      Reasons to be seen in the emergency room:  - Develop a fever of 100.4 or higher  - Cough changes, coughing up blood, or become short of breath  - Neck stiffness  - Chest pain  - Severe headache  - Unable to tolerate eating or drinking fluids    Otherwise, if no symptom improvement after 5 days, follow-up with your primary care provider.      Subjective:        Connie Bhakta is a 27 y.o. female here for evaluation of a cough.  The cough is productive of occasional loose phlegm with chest tightness and chest pain during cough. Onset of symptoms was 4 days ago, gradually worsening since that time.  Associated symptoms include voice change, night sweats, and sore throat. Patient does not have a history of asthma. Patient has not had recent travel. Patient does not have a history of smoking. Treatment has included advil with last dose taken 3 hours ago and mucinex.    The following portions of the patient's history were reviewed and updated as appropriate: allergies, current medications and problem list.    Review of Systems  Pertinent items are noted in HPI.     Allergies  No Known Allergies       Objective:       /80  Pulse 75  Temp 98.6  F (37  C) (Oral)   Resp 14  Wt 158 lb (71.7 kg)  SpO2 98%  Breastfeeding? No  General  appearance: alert, appears stated age, cooperative, no distress and non-toxic  Head: Normocephalic, without obvious abnormality, atraumatic, right maxillary sinus tender to percussion  Ears: normal TM's and external ear canals both ears  Nose: no discharge, deviated septum  Throat: posterior oropharynx erythematous, no tonsil swelling; MMM, lips and tongue normal  Neck: mild anterior cervical adenopathy and supple, symmetrical, trachea midline  Lungs: clear to auscultation bilaterally and no rhonchi, rales, or wheezing  Heart: regular rate and rhythm, S1, S2 normal, no murmur, click, rub or gallop     Lab Results    Recent Results (from the past 24 hour(s))   Rapid Strep A Screen-Throat   Result Value Ref Range    Rapid Strep A Antigen No Group A Strep detected, presumptive negative No Group A Strep detected, presumptive negative     I personally reviewed these results and discussed findings with the patient.

## 2021-07-12 ENCOUNTER — OFFICE VISIT (OUTPATIENT)
Dept: OPTOMETRY | Facility: CLINIC | Age: 31
End: 2021-07-12
Payer: COMMERCIAL

## 2021-07-12 DIAGNOSIS — H40.039 ANATOMICAL NARROW ANGLE: ICD-10-CM

## 2021-07-12 DIAGNOSIS — H52.13 MYOPIA OF BOTH EYES: Primary | ICD-10-CM

## 2021-07-12 PROCEDURE — 92014 COMPRE OPH EXAM EST PT 1/>: CPT | Performed by: OPTOMETRIST

## 2021-07-12 PROCEDURE — 92015 DETERMINE REFRACTIVE STATE: CPT | Performed by: OPTOMETRIST

## 2021-07-12 ASSESSMENT — REFRACTION_MANIFEST
OS_CYLINDER: SPHERE
OS_SPHERE: -0.75
OD_CYLINDER: +0.25
METHOD_AUTOREFRACTION: 1
OD_SPHERE: -0.50
OD_SPHERE: -0.75
OS_CYLINDER: +0.25
OS_SPHERE: -0.50
OD_CYLINDER: SPHERE
OS_AXIS: 132
OD_AXIS: 052

## 2021-07-12 ASSESSMENT — EXTERNAL EXAM - LEFT EYE: OS_EXAM: NORMAL

## 2021-07-12 ASSESSMENT — REFRACTION_WEARINGRX
SPECS_TYPE: SVL
OS_SPHERE: -0.50
OD_CYLINDER: SPHERE
OD_SPHERE: -0.50
OS_CYLINDER: SPHERE

## 2021-07-12 ASSESSMENT — SLIT LAMP EXAM - LIDS
COMMENTS: NORMAL
COMMENTS: NORMAL

## 2021-07-12 ASSESSMENT — TONOMETRY
OS_IOP_MMHG: 17
OD_IOP_MMHG: 17
IOP_METHOD: APPLANATION

## 2021-07-12 ASSESSMENT — CUP TO DISC RATIO
OS_RATIO: 0.1
OD_RATIO: 0.1

## 2021-07-12 ASSESSMENT — CONF VISUAL FIELD
METHOD: COUNTING FINGERS
OD_NORMAL: 1
OS_NORMAL: 1

## 2021-07-12 ASSESSMENT — VISUAL ACUITY
OS_CC: 20/20
OD_CC: 20/20-1
METHOD: SNELLEN - LINEAR
CORRECTION_TYPE: GLASSES
OS_SC: 20/20
OS_CC: 20/20
OD_SC+: -1
OD_CC: 20/25
OD_SC: 20/20

## 2021-07-12 ASSESSMENT — EXTERNAL EXAM - RIGHT EYE: OD_EXAM: NORMAL

## 2021-07-12 NOTE — PROGRESS NOTES
Chief Complaint   Patient presents with     Annual Eye Exam      Glaucoma check w/ fohx (mom) with acute angle closure   No signs of angle closure discussed or concerns     Last Eye Exam: 2018  Dilated Previously: Yes. Signs and symptoms of dilation were discussed. Patient consents to dilation today.    What are you currently using to see?  Glasses - but really only sometimes for driving.       Distance Vision Acuity: Satisfied with vision    Near Vision Acuity: Satisfied with vision while reading and using computer unaided    Eye Comfort: dry, itchy during spring and fall   Do you use eye drops? 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       OBJECTIVE: See Ophthalmology exam    ASSESSMENT:    ICD-10-CM    1. Myopia of both eyes  H52.13    2. Anatomical narrow angle  H40.039     not occludeable after dilation IOP was the same     PLAN:   No change in prescription or angle  S/s angle closure  See Dr Carvajal next year unless s/s angle closure then same day or [facility] emergency dept /ophthalmology        Jeanette Cotton OD

## 2021-07-12 NOTE — PATIENT INSTRUCTIONS
No vision change   Intraocular pressure is stable   Patient Education     Acute Narrow-Angle Glaucoma    The eye is a fluid-filled globe with a lens near the front and a light-sensitive screen in the back (retina). The optic nerve conducts light signals from the retina to the brain. It allows you to see images. Eye fluid is constantly made within the eye. Excess fluid drains out into the bloodstream.   Narrow-angle glaucoma (NAG) occurs when the eye s fluid drainage system is blocked. The internal eye pressure then rises rapidly. The high pressure in the eye can reduce blood flow to the optic nerve. This can cause a loss of vision. This is a medical emergency. If not treated right away, this condition may cause complete, lasting (permanent) blindness within days.   The cause of the blockage may be a problem with the eye s fluid drainage system that has been there since birth. Or the blockage may be a result of eye changes that happen as people get older. Narrow-angle glaucoma in one eye increases the risk of getting the same problem in the other eye. Your eye care provider will talk with you about preventive treatment.   The symptoms of NAG appear quickly. You may have:     Eye pain    Redness of the eye    Blurred vision    Halos around lighted objects    Nausea and vomiting  You may have another attack of NAG if your pupil becomes enlarged (dilated). Pupils dilate in darkness or dim light and when you are stressed or excited. They can also dilate if you take certain medicines. These include antihistamines (like diphenhydramine), tricyclic antidepressants (like amitriptyline), and eye drops used to dilate your pupils. Be alert to the symptoms of NAG. Seek medical help at once if they come back.   Eye drops, pills, and IV (intravenous) medicines are often some of the first treatments used. Your eye care provider will likely advise surgery or laser treatment. These treatments help drain the fluid and reduce the  pressure in the eye.   Home care    Take medicines exactly as prescribed.    You may use acetaminophen or ibuprofen to control pain, unless another medicine was prescribed. Note: If you have chronic liver or kidney disease, or have ever had a stomach ulcer or gastrointestinal bleeding, talk with your healthcare provider before using these medicines.    Don t take medicines that dilate the pupil, such as antihistamines, tricyclic antidepressants, and certain eye drops. Tell your eye care provider about all medicines you take. This includes vitamins, supplements, herbal remedies, and prescription and over-the-counter medicines.    Stress may trigger an attack of fluid blockage in the eye. Learn ways to manage your stress. There are many books and tapes on this topic. Or talk with your healthcare provider for suggestions.    Protect your eyes. An eye injury can cause increased eye pressure. Wear safety glasses or goggles when you play sports, use tools or machinery, or work with chemicals.    Follow-up care  It is very important that you make a follow-up appointment with an eye care provider (ophthalmologist) as soon as possible.   When to seek medical advice  Seek care at your local hospital emergency room right away if you have:     Increasing eye pain, redness, or swelling    Headache, nausea, vomiting    Increased blurring of vision    Halos around lights    Loss of eyesight  [employer] last reviewed this educational content on 6/1/2020 2000-2021 [employer]. All rights reserved. This information is not intended as a substitute for professional medical care. Always follow your healthcare professional's instructions.

## 2021-07-12 NOTE — LETTER
7/12/2021         RE: Connie Bhakta  [address]   [place] [id number]        Dear Colleague,    Thank you for referring your patient, Connie Bhakta, to the [facility]. Please see a copy of my visit note below.    Chief Complaint   Patient presents with     Annual Eye Exam      Glaucoma check w/ fohx (mom) with acute angle closure   No signs of angle closure discussed or concerns     Last Eye Exam: 2018  Dilated Previously: Yes. Signs and symptoms of dilation were discussed. Patient consents to dilation today.    What are you currently using to see?  Glasses - but really only sometimes for driving.       Distance Vision Acuity: Satisfied with vision    Near Vision Acuity: Satisfied with vision while reading and using computer unaided    Eye Comfort: dry, itchy during spring and fall   Do you use eye drops? 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       OBJECTIVE: See Ophthalmology exam    ASSESSMENT:    ICD-10-CM    1. Myopia of both eyes  H52.13    2. Anatomical narrow angle  H40.039     not occludeable after dilation IOP was the same     PLAN:   No change in prescription or angle  S/s angle closure  See Dr Carvajal next year unless s/s angle closure then same day or [facility] emergency dept /ophthalmology        Jeanette Cotton OD       Again, thank you for allowing me to participate in the care of your patient.        Sincerely,        Jeanette Cotton, OD

## 2021-07-15 ENCOUNTER — OFFICE VISIT (OUTPATIENT)
Dept: PEDIATRICS | Facility: CLINIC | Age: 31
End: 2021-07-15
Payer: COMMERCIAL

## 2021-07-15 VITALS
SYSTOLIC BLOOD PRESSURE: 98 MMHG | BODY MASS INDEX: 29.88 KG/M2 | HEART RATE: 79 BPM | TEMPERATURE: 98.6 F | RESPIRATION RATE: 16 BRPM | DIASTOLIC BLOOD PRESSURE: 60 MMHG | OXYGEN SATURATION: 99 % | WEIGHT: 175 LBS | HEIGHT: 64 IN

## 2021-07-15 DIAGNOSIS — H73.91 TM (TYMPANIC MEMBRANE DISORDER), RIGHT: ICD-10-CM

## 2021-07-15 DIAGNOSIS — Z01.818 PREOP GENERAL PHYSICAL EXAM: Primary | ICD-10-CM

## 2021-07-15 LAB
ERYTHROCYTE [DISTWIDTH] IN BLOOD BY AUTOMATED COUNT: 12.5 % (ref 10–15)
HCG UR QL: NEGATIVE
HCT VFR BLD AUTO: 39.5 % (ref 35–47)
HGB BLD-MCNC: 12.9 G/DL (ref 11.7–15.7)
MCH RBC QN AUTO: 29.9 PG (ref 26.5–33)
MCHC RBC AUTO-ENTMCNC: 32.7 G/DL (ref 31.5–36.5)
MCV RBC AUTO: 91 FL (ref 78–100)
PLATELET # BLD AUTO: 319 10E3/UL (ref 150–450)
RBC # BLD AUTO: 4.32 10E6/UL (ref 3.8–5.2)
WBC # BLD AUTO: 6.7 10E3/UL (ref 4–11)

## 2021-07-15 PROCEDURE — 99214 OFFICE O/P EST MOD 30 MIN: CPT | Performed by: INTERNAL MEDICINE

## 2021-07-15 PROCEDURE — 81025 URINE PREGNANCY TEST: CPT | Performed by: INTERNAL MEDICINE

## 2021-07-15 PROCEDURE — 85027 COMPLETE CBC AUTOMATED: CPT | Performed by: INTERNAL MEDICINE

## 2021-07-15 PROCEDURE — 36415 COLL VENOUS BLD VENIPUNCTURE: CPT | Performed by: INTERNAL MEDICINE

## 2021-07-15 ASSESSMENT — MIFFLIN-ST. JEOR: SCORE: 1493.79

## 2021-07-15 NOTE — PATIENT INSTRUCTIONS

## 2021-07-15 NOTE — PROGRESS NOTES
[facility]  [address]  [address]  [place] [address]  Phone: [phone]  Fax: [phone]  Primary Provider: Pat Pearson  Pre-op Performing Provider: ALEE FLORES      PREOPERATIVE EVALUATION:  Today's date: 7/15/2021    Connie Bhakta is a 30 year old female who presents for a preoperative evaluation.    Surgical Information:  Surgery/Procedure: Ear surgery Right  Surgery Location: [facility]   Surgeon: Dr. Roth  Surgery Date: 07/21/21  Time of Surgery: unknown  Where patient plans to recover: At home with family  Fax number for surgical facility: [phone]    Type of Anesthesia Anticipated: General    Assessment & Plan     The proposed surgical procedure is considered LOW risk.    Preop general physical exam  Tm (tympanic membrane disorder), right  Approved for surgery.   - Asymptomatic COVID-19 Virus (Coronavirus) by PCR Nasopharyngeal; Future  - CBC with platelets  - HCG Qual, Urine ([id number])          Risks and Recommendations:  The patient has the following additional risks and recommendations for perioperative complications:   - No identified additional risk factors other than previously addressed    Medication Instructions:  Patient is to take all scheduled medications on the day of surgery    RECOMMENDATION:  APPROVAL GIVEN to proceed with proposed procedure, without further diagnostic evaluation.                      Subjective     HPI related to upcoming procedure: Has had frequent right ear infections for a long time, has had other procedures.  Last procedure was Jan 2012.      Preop Questions 7/9/2021   1. Have you ever had a heart attack or stroke? No   2. Have you ever had surgery on your heart or blood vessels, such as a stent placement, a coronary artery bypass, or surgery on an artery in your head, neck, heart, or legs? No   3. Do you have chest pain with activity? No   4. Do you have a history of  heart failure? No    5. Do you currently have a cold, bronchitis or symptoms of other infection? No   6. Do you have a cough, shortness of breath, or wheezing? No   7. Do you or anyone in your family have previous history of blood clots? No   8. Do you or does anyone in your family have a serious bleeding problem such as prolonged bleeding following surgeries or cuts? No   9. Have you ever had problems with anemia or been told to take iron pills? No   10. Have you had any abnormal blood loss such as black, tarry or bloody stools, or abnormal vaginal bleeding? No   11. Have you ever had a blood transfusion? No   12. Are you willing to have a blood transfusion if it is medically needed before, during, or after your surgery? Yes   13. Have you or any of your relatives ever had problems with anesthesia? No   14. Do you have sleep apnea, excessive snoring or daytime drowsiness? No   15. Do you have any artifical heart valves or other implanted medical devices like a pacemaker, defibrillator, or continuous glucose monitor? No   16. Do you have artificial joints? No   17. Are you allergic to latex? No   18. Is there any chance that you may be pregnant? No       Health Care Directive:  Patient does not have a Health Care Directive or Living Will: Discussed advance care planning with patient; however, patient declined at this time.    Preoperative Review of :   reviewed - no record of controlled substances prescribed.          Review of Systems  Constitutional, neuro, ENT, endocrine, pulmonary, cardiac, gastrointestinal, genitourinary, musculoskeletal, integument and psychiatric systems are negative, except as otherwise noted.    Patient Active Problem List    Diagnosis Date Noted     Moderate dysplasia of cervix (DIPIKA II) 02/12/2020     Priority: Medium     11/22/10 NL pap  11/21/11 LSIL pap @ age 21   12/27/11 [place] done-DIPIKA 1  05/23/12 LSIL pap, +HPV HR  12/2012 [facility] pap, + HR HPV (type not specified)  1/2013 [place] DIPIKA 1  5/2013 NIL  pap  12/2013 NIL pap  12/12/14 NL pap neg HPV  09/16/16 NL pap, +HPV HR # 16 (@ age 25) pt. Is to schedule a colp, episode created, HM and HX updated, tracking has been started.  11/11/16 [place] done. No bx taken. Plan: pap in 1 year  11/6/17 Dx pap- NIL, +HPV 16. Plan repeat pap in 1 year per provider.  12/10/18 Dx NIL pap, + HR HPV 16. Plan colposcopy or repeat pap in one year per provider  1/9/19 [place] - no visible lesions, no bx. Plan repeat pap 1 year  12/13/19 NIL, +HPV 16. Plan: [place]  2/12/20 [place] Bx: DIPIKA 1 & 2, ECC: HSIL. Plan LEEP  03/06/20 L[place] Bx: DIPIKA 1, margins neg, ECC: neg. Plan 1 year cotest  03/2/21 NIL, +HPV 16. Plan [place] bef 6/2/21   3/8/21 Pt notified  3/19/21 [place]: Negative, ECC: neg. Plan 1 yr co-test. Pt notified       Family history of rectal cancer 12/13/2019     Priority: Medium     Father dx'ed at age 54       Tm (tympanic membrane disorder), right 12/13/2019     Priority: Medium     Cervical high risk HPV (human papillomavirus) test positive 01/09/2019     Priority: Medium     Anxiety 11/06/2017     Priority: Medium     Anatomical narrow angle 10/11/2017     Priority: Medium     Papanicolaou smear of cervix with low grade squamous intraepithelial lesion (LGSIL) 10/04/2016     Priority: Medium     Adhesion of iris, left 09/12/2016     Priority: Medium     [place] adheres to lens       Family history of thyroid disease 11/21/2011     Priority: Medium     Family history of diabetes mellitus 11/20/2010     Priority: Medium     Hearing loss 07/14/2010     Priority: Medium      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, 09/16/16, 11/6/17, 12/10/18, 12/13/19    see problem list     History of colposcopy 12/2011, 1/2013, 11/2016, 01/2019, 2/12/20    see problem list     Mild dysplasia of cervix (DIPIKA I) 12/27/2011    Colpo at      Moderate dysplasia of cervix (DIPIKA II) 2/12/2020     Papanicolaou smear of cervix with low grade squamous intraepithelial lesion  "(LGSIL) 11/21/11,05/23/12     Past Surgical History:   Procedure Laterality Date     AS TMJ ARTHROSCOPY/SURGERY Right 2012    hx of R TM tubes and surgery for eustacian tube     COLPOSCOPY,BX CERVIX/ENDOCERV CURR      x 2     COLPOSCOPY,LOOP ELECTRD CERVIX EXCIS  03/06/2020    DIPIKA 2 - Path showed only DIPIKA 1 on ectoCx LEEP, neg margins     Current Outpatient Medications   Medication Sig Dispense Refill     norgestrel-ethinyl estradiol (CRYSELLE-28) 0.3-30 MG-MCG tablet Take 1 tablet by mouth daily 84 tablet prn     sertraline (ZOLOFT) 50 MG tablet Take 1 tablet (50 mg) by mouth daily 90 tablet 1       No Known Allergies     Social History     Tobacco Use     Smoking status: Never Smoker     Smokeless tobacco: Never Used   Substance Use Topics     Alcohol use: Yes     Alcohol/week: 0.0 standard drinks     Comment: once a month       History   Drug Use No         Objective     BP 98/60   Pulse 79   Temp 98.6  F (37  C) (Tympanic)   Resp 16   Ht 1.626 m (5' 4\")   Wt 79.4 kg (175 lb)   SpO2 99%   BMI 30.04 kg/m      Physical Exam    GENERAL APPEARANCE: healthy, alert and no distress     EYES: EOMI, PERRL     HENT: ear canals and TM's normal and nose and mouth without ulcers or lesions     NECK: no adenopathy, no asymmetry, masses, or scars and thyroid normal to palpation     RESP: lungs clear to auscultation - no rales, rhonchi or wheezes     CV: regular rates and rhythm, normal S1 S2, no S3 or S4 and no murmur, click or rub     ABDOMEN:  soft, nontender, no HSM or masses and bowel sounds normal     MS: extremities normal- no gross deformities noted, no evidence of inflammation in joints, FROM in all extremities.     SKIN: no suspicious lesions or rashes     NEURO: Normal strength and tone, sensory exam grossly normal, mentation intact and speech normal     PSYCH: mentation appears normal. and affect normal/bright     LYMPHATICS: No cervical adenopathy    No results for input(s): HGB, PLT, INR, NA, POTASSIUM, CR, " A1C in the last 19961 hours.     Diagnostics:  Labs pending at this time.  Results will be reviewed when available.   No EKG required for low risk surgery (cataract, skin procedure, breast biopsy, etc).    Revised Cardiac Risk Index (RCRI):  The patient has the following serious cardiovascular risks for perioperative complications:   - No serious cardiac risks = 0 points     RCRI Interpretation: 0 points: Class I (very low risk - 0.4% complication rate)           Signed Electronically by: Noemí Slaughter MD  Copy of this evaluation report is provided to requesting physician.

## 2021-07-19 ENCOUNTER — ALLIED HEALTH/NURSE VISIT (OUTPATIENT)
Dept: PEDIATRICS | Facility: CLINIC | Age: 31
End: 2021-07-19
Attending: INTERNAL MEDICINE
Payer: COMMERCIAL

## 2021-07-19 DIAGNOSIS — Z01.818 PREOP GENERAL PHYSICAL EXAM: ICD-10-CM

## 2021-07-19 LAB — SARS-COV-2 RNA RESP QL NAA+PROBE: NEGATIVE

## 2021-07-19 PROCEDURE — U0005 INFEC AGEN DETEC AMPLI PROBE: HCPCS

## 2021-07-19 PROCEDURE — U0003 INFECTIOUS AGENT DETECTION BY NUCLEIC ACID (DNA OR RNA); SEVERE ACUTE RESPIRATORY SYNDROME CORONAVIRUS 2 (SARS-COV-2) (CORONAVIRUS DISEASE [COVID-19]), AMPLIFIED PROBE TECHNIQUE, MAKING USE OF HIGH THROUGHPUT TECHNOLOGIES AS DESCRIBED BY CMS-2020-01-R: HCPCS

## 2021-07-22 ENCOUNTER — MYC MEDICAL ADVICE (OUTPATIENT)
Dept: OPTOMETRY | Facility: CLINIC | Age: 31
End: 2021-07-22

## 2021-10-06 ENCOUNTER — IMMUNIZATION (OUTPATIENT)
Dept: NURSING | Facility: CLINIC | Age: 31
End: 2021-10-06
Payer: COMMERCIAL

## 2021-10-06 PROCEDURE — 91300 PR COVID VAC PFIZER DIL RECON 30 MCG/0.3 ML IM: CPT

## 2021-10-06 PROCEDURE — 0004A PR COVID VAC PFIZER DIL RECON 30 MCG/0.3 ML IM: CPT

## 2021-10-09 ENCOUNTER — HEALTH MAINTENANCE LETTER (OUTPATIENT)
Age: 31
End: 2021-10-09

## 2021-12-05 ENCOUNTER — MYC MEDICAL ADVICE (OUTPATIENT)
Dept: PEDIATRICS | Facility: CLINIC | Age: 31
End: 2021-12-05
Payer: COMMERCIAL

## 2021-12-08 DIAGNOSIS — F41.9 ANXIETY: ICD-10-CM

## 2021-12-09 NOTE — TELEPHONE ENCOUNTER
I have not seen this patient since 2017, patient has been following up at the [facility], please forward refill request to [facility] provider   Araceli Leyva, NORBERTO CNP

## 2021-12-09 NOTE — TELEPHONE ENCOUNTER
Routing refill request to provider for review/approval because:  A break in medication    Eladia Hogan RN on 12/9/2021 at 11:21 AM

## 2021-12-09 NOTE — TELEPHONE ENCOUNTER
Routing refill request to provider for review/approval because:  A break in medication    Eladia Hogan RN on 12/9/2021 at 3:43 PM

## 2021-12-10 NOTE — TELEPHONE ENCOUNTER
My chart response from the pt below.     I just received an odd call from someone at the [facility] inquiring where I have been getting refills of this medication the last 6 months - which, I have not refilled outside of this encounter/time seeing a [place] provider - and instructing me I need to do an e-visit every 3-6 months for this medication.  I have been taking this medication for I think 7 years and seeing Araceli for going on 3 years and this is the first I have heard this.  Could you help clarify?  Thank you!

## 2021-12-10 NOTE — TELEPHONE ENCOUNTER
Pt states that she has been on it continuously. She states that she has never heard that she needs to do a follow-up every 6 months. Explained to pt to initiate a e-visit and she could discuss at that visit. Keila Holm LPN

## 2021-12-10 NOTE — CONFIDENTIAL NOTE
Please let he melina, as you can see int he 12/8 encouter that the refill request  Noted it was a break in the medication. Obviously an error.     PHQ 11/6/2017   PHQ-9 Total Score 6   Q9: Thoughts of better off dead/self-harm past 2 weeks Not at all     MIMI-7 SCORE 11/6/2017 12/5/2017   Total Score 18 2       She needs ot update the phq and mimi. Once done, forward to me and I can reifll. Please have her get that appointment scheudled in march as i'm booking out that far.

## 2021-12-13 ASSESSMENT — ANXIETY QUESTIONNAIRES
7. FEELING AFRAID AS IF SOMETHING AWFUL MIGHT HAPPEN: NOT AT ALL
GAD7 TOTAL SCORE: 6
7. FEELING AFRAID AS IF SOMETHING AWFUL MIGHT HAPPEN: NOT AT ALL
2. NOT BEING ABLE TO STOP OR CONTROL WORRYING: SEVERAL DAYS
GAD7 TOTAL SCORE: 6
GAD7 TOTAL SCORE: 6
3. WORRYING TOO MUCH ABOUT DIFFERENT THINGS: SEVERAL DAYS
1. FEELING NERVOUS, ANXIOUS, OR ON EDGE: SEVERAL DAYS
4. TROUBLE RELAXING: SEVERAL DAYS
6. BECOMING EASILY ANNOYED OR IRRITABLE: SEVERAL DAYS
5. BEING SO RESTLESS THAT IT IS HARD TO SIT STILL: SEVERAL DAYS

## 2021-12-13 ASSESSMENT — PATIENT HEALTH QUESTIONNAIRE - PHQ9
SUM OF ALL RESPONSES TO PHQ QUESTIONS 1-9: 5
10. IF YOU CHECKED OFF ANY PROBLEMS, HOW DIFFICULT HAVE THESE PROBLEMS MADE IT FOR YOU TO DO YOUR WORK, TAKE CARE OF THINGS AT HOME, OR GET ALONG WITH OTHER PEOPLE: SOMEWHAT DIFFICULT
SUM OF ALL RESPONSES TO PHQ QUESTIONS 1-9: 5

## 2021-12-13 NOTE — TELEPHONE ENCOUNTER
I filled medication. We had called to clarify she did not have a break, as indicated by refill protocll.

## 2021-12-14 ASSESSMENT — PATIENT HEALTH QUESTIONNAIRE - PHQ9: SUM OF ALL RESPONSES TO PHQ QUESTIONS 1-9: 5

## 2021-12-14 ASSESSMENT — ANXIETY QUESTIONNAIRES: GAD7 TOTAL SCORE: 6

## 2022-01-29 ENCOUNTER — HEALTH MAINTENANCE LETTER (OUTPATIENT)
Age: 32
End: 2022-01-29

## 2022-03-20 SDOH — HEALTH STABILITY: PHYSICAL HEALTH: ON AVERAGE, HOW MANY MINUTES DO YOU ENGAGE IN EXERCISE AT THIS LEVEL?: 40 MIN

## 2022-03-20 SDOH — ECONOMIC STABILITY: FOOD INSECURITY: WITHIN THE PAST 12 MONTHS, YOU WORRIED THAT YOUR FOOD WOULD RUN OUT BEFORE YOU GOT MONEY TO BUY MORE.: NEVER TRUE

## 2022-03-20 SDOH — ECONOMIC STABILITY: INCOME INSECURITY: IN THE LAST 12 MONTHS, WAS THERE A TIME WHEN YOU WERE NOT ABLE TO PAY THE MORTGAGE OR RENT ON TIME?: NO

## 2022-03-20 SDOH — HEALTH STABILITY: PHYSICAL HEALTH: ON AVERAGE, HOW MANY DAYS PER WEEK DO YOU ENGAGE IN MODERATE TO STRENUOUS EXERCISE (LIKE A BRISK WALK)?: 3 DAYS

## 2022-03-20 SDOH — ECONOMIC STABILITY: FOOD INSECURITY: WITHIN THE PAST 12 MONTHS, THE FOOD YOU BOUGHT JUST DIDN'T LAST AND YOU DIDN'T HAVE MONEY TO GET MORE.: NEVER TRUE

## 2022-03-20 SDOH — ECONOMIC STABILITY: TRANSPORTATION INSECURITY
IN THE PAST 12 MONTHS, HAS LACK OF TRANSPORTATION KEPT YOU FROM MEETINGS, WORK, OR FROM GETTING THINGS NEEDED FOR DAILY LIVING?: NO

## 2022-03-20 SDOH — ECONOMIC STABILITY: TRANSPORTATION INSECURITY
IN THE PAST 12 MONTHS, HAS THE LACK OF TRANSPORTATION KEPT YOU FROM MEDICAL APPOINTMENTS OR FROM GETTING MEDICATIONS?: NO

## 2022-03-20 SDOH — ECONOMIC STABILITY: INCOME INSECURITY: HOW HARD IS IT FOR YOU TO PAY FOR THE VERY BASICS LIKE FOOD, HOUSING, MEDICAL CARE, AND HEATING?: NOT HARD AT ALL

## 2022-03-20 ASSESSMENT — LIFESTYLE VARIABLES
HOW OFTEN DO YOU HAVE A DRINK CONTAINING ALCOHOL: MONTHLY OR LESS
HOW OFTEN DO YOU HAVE SIX OR MORE DRINKS ON ONE OCCASION: NEVER
HOW MANY STANDARD DRINKS CONTAINING ALCOHOL DO YOU HAVE ON A TYPICAL DAY: 1 OR 2

## 2022-03-20 ASSESSMENT — ENCOUNTER SYMPTOMS
PARESTHESIAS: 0
SORE THROAT: 0
NERVOUS/ANXIOUS: 0
FREQUENCY: 0
HEMATOCHEZIA: 0
COUGH: 0
BREAST MASS: 0
HEARTBURN: 0
CONSTIPATION: 0
DIARRHEA: 0
CHILLS: 0
NAUSEA: 0
PALPITATIONS: 0
WEAKNESS: 0
HEADACHES: 0
HEMATURIA: 0
DIZZINESS: 0
DYSURIA: 0
SHORTNESS OF BREATH: 0
JOINT SWELLING: 0
MYALGIAS: 0
EYE PAIN: 0
ARTHRALGIAS: 0
ABDOMINAL PAIN: 0
FEVER: 0

## 2022-03-20 ASSESSMENT — SOCIAL DETERMINANTS OF HEALTH (SDOH)
IN A TYPICAL WEEK, HOW MANY TIMES DO YOU TALK ON THE PHONE WITH FAMILY, FRIENDS, OR NEIGHBORS?: MORE THAN THREE TIMES A WEEK
DO YOU BELONG TO ANY CLUBS OR ORGANIZATIONS SUCH AS CHURCH GROUPS UNIONS, FRATERNAL OR ATHLETIC GROUPS, OR SCHOOL GROUPS?: PATIENT DECLINED
HOW OFTEN DO YOU GET TOGETHER WITH FRIENDS OR RELATIVES?: TWICE A WEEK
HOW OFTEN DO YOU ATTEND CHURCH OR RELIGIOUS SERVICES?: PATIENT DECLINED
ARE YOU MARRIED, WIDOWED, DIVORCED, SEPARATED, NEVER MARRIED, OR LIVING WITH A PARTNER?: NEVER MARRIED

## 2022-03-21 ENCOUNTER — OFFICE VISIT (OUTPATIENT)
Dept: PEDIATRICS | Facility: CLINIC | Age: 32
End: 2022-03-21
Payer: COMMERCIAL

## 2022-03-21 VITALS
TEMPERATURE: 97.9 F | HEART RATE: 85 BPM | HEIGHT: 64 IN | BODY MASS INDEX: 29.65 KG/M2 | SYSTOLIC BLOOD PRESSURE: 100 MMHG | DIASTOLIC BLOOD PRESSURE: 68 MMHG | OXYGEN SATURATION: 100 % | WEIGHT: 173.7 LBS

## 2022-03-21 DIAGNOSIS — Z11.59 NEED FOR HEPATITIS C SCREENING TEST: ICD-10-CM

## 2022-03-21 DIAGNOSIS — H40.039 ANATOMICAL NARROW ANGLE: ICD-10-CM

## 2022-03-21 DIAGNOSIS — Z11.4 SCREENING FOR HIV (HUMAN IMMUNODEFICIENCY VIRUS): ICD-10-CM

## 2022-03-21 DIAGNOSIS — H21.502: ICD-10-CM

## 2022-03-21 DIAGNOSIS — Z12.4 CERVICAL CANCER SCREENING: ICD-10-CM

## 2022-03-21 DIAGNOSIS — H90.11 CONDUCTIVE HEARING LOSS OF RIGHT EAR, UNSPECIFIED HEARING STATUS ON CONTRALATERAL SIDE: ICD-10-CM

## 2022-03-21 DIAGNOSIS — Z83.49 FAMILY HISTORY OF THYROID DISEASE: ICD-10-CM

## 2022-03-21 DIAGNOSIS — F41.9 ANXIETY: ICD-10-CM

## 2022-03-21 DIAGNOSIS — R87.612 PAPANICOLAOU SMEAR OF CERVIX WITH LOW GRADE SQUAMOUS INTRAEPITHELIAL LESION (LGSIL): ICD-10-CM

## 2022-03-21 DIAGNOSIS — Z00.00 ROUTINE GENERAL MEDICAL EXAMINATION AT A HEALTH CARE FACILITY: Primary | ICD-10-CM

## 2022-03-21 LAB
CHOLEST SERPL-MCNC: 211 MG/DL
FASTING STATUS PATIENT QL REPORTED: YES
FASTING STATUS PATIENT QL REPORTED: YES
GLUCOSE BLD-MCNC: 81 MG/DL (ref 70–99)
HCV AB SERPL QL IA: NONREACTIVE
HDLC SERPL-MCNC: 54 MG/DL
HIV 1+2 AB+HIV1 P24 AG SERPL QL IA: NONREACTIVE
LDLC SERPL CALC-MCNC: 136 MG/DL
NONHDLC SERPL-MCNC: 157 MG/DL
TRIGL SERPL-MCNC: 106 MG/DL
TSH SERPL DL<=0.005 MIU/L-ACNC: 3.75 MU/L (ref 0.4–4)

## 2022-03-21 PROCEDURE — G0145 SCR C/V CYTO,THINLAYER,RESCR: HCPCS | Performed by: NURSE PRACTITIONER

## 2022-03-21 PROCEDURE — 84443 ASSAY THYROID STIM HORMONE: CPT | Performed by: NURSE PRACTITIONER

## 2022-03-21 PROCEDURE — 82947 ASSAY GLUCOSE BLOOD QUANT: CPT | Performed by: NURSE PRACTITIONER

## 2022-03-21 PROCEDURE — 87624 HPV HI-RISK TYP POOLED RSLT: CPT | Performed by: NURSE PRACTITIONER

## 2022-03-21 PROCEDURE — 36415 COLL VENOUS BLD VENIPUNCTURE: CPT | Performed by: NURSE PRACTITIONER

## 2022-03-21 PROCEDURE — 87389 HIV-1 AG W/HIV-1&-2 AB AG IA: CPT | Performed by: NURSE PRACTITIONER

## 2022-03-21 PROCEDURE — 86803 HEPATITIS C AB TEST: CPT | Performed by: NURSE PRACTITIONER

## 2022-03-21 PROCEDURE — 80061 LIPID PANEL: CPT | Performed by: NURSE PRACTITIONER

## 2022-03-21 PROCEDURE — 99395 PREV VISIT EST AGE 18-39: CPT | Performed by: NURSE PRACTITIONER

## 2022-03-21 ASSESSMENT — ENCOUNTER SYMPTOMS
JOINT SWELLING: 0
BREAST MASS: 0
CHILLS: 0
FEVER: 0
COUGH: 0
ARTHRALGIAS: 0
HEMATOCHEZIA: 0
HEADACHES: 0
HEMATURIA: 0
MYALGIAS: 0
DIARRHEA: 0
DYSURIA: 0
PALPITATIONS: 0
ABDOMINAL PAIN: 0
NERVOUS/ANXIOUS: 0
NAUSEA: 0
HEARTBURN: 0
FREQUENCY: 0
SHORTNESS OF BREATH: 0
CONSTIPATION: 0
SORE THROAT: 0
EYE PAIN: 0
WEAKNESS: 0
DIZZINESS: 0
PARESTHESIAS: 0

## 2022-03-21 ASSESSMENT — ANXIETY QUESTIONNAIRES
6. BECOMING EASILY ANNOYED OR IRRITABLE: SEVERAL DAYS
1. FEELING NERVOUS, ANXIOUS, OR ON EDGE: SEVERAL DAYS
7. FEELING AFRAID AS IF SOMETHING AWFUL MIGHT HAPPEN: NOT AT ALL
GAD7 TOTAL SCORE: 5
IF YOU CHECKED OFF ANY PROBLEMS ON THIS QUESTIONNAIRE, HOW DIFFICULT HAVE THESE PROBLEMS MADE IT FOR YOU TO DO YOUR WORK, TAKE CARE OF THINGS AT HOME, OR GET ALONG WITH OTHER PEOPLE: NOT DIFFICULT AT ALL
3. WORRYING TOO MUCH ABOUT DIFFERENT THINGS: NOT AT ALL
5. BEING SO RESTLESS THAT IT IS HARD TO SIT STILL: SEVERAL DAYS
2. NOT BEING ABLE TO STOP OR CONTROL WORRYING: SEVERAL DAYS

## 2022-03-21 ASSESSMENT — PATIENT HEALTH QUESTIONNAIRE - PHQ9: 5. POOR APPETITE OR OVEREATING: SEVERAL DAYS

## 2022-03-21 NOTE — PROGRESS NOTES
SUBJECTIVE:   CC: Connie Bhakta is an 31 year old woman who presents for preventive health visit.         Patient has been advised of split billing requirements and indicates understanding: Yes     Healthy Habits:     Getting at least 3 servings of Calcium per day:  Yes    Bi-annual eye exam:  Yes    Dental care twice a year:  Yes    Sleep apnea or symptoms of sleep apnea:  None    Diet:  Regular (no restrictions)    Frequency of exercise:  2-3 days/week    Duration of exercise:  30-45 minutes    Taking medications regularly:  Yes    Medication side effects:  Not applicable    PHQ-2 Total Score: 0    Additional concerns today:  No    History of anxiety, is on zoloft and doing well.     MIMI-7 SCORE 12/5/2017 12/13/2021 3/21/2022   Total Score - 6 (mild anxiety) -   Total Score 2 6 5     History of hearing loss, had surgery last fall, is going ot have hearing aid.         Today's PHQ-2 Score:   PHQ-2 ( 1999 Pfizer) 3/20/2022   Q1: Little interest or pleasure in doing things 0   Q2: Feeling down, depressed or hopeless 0   PHQ-2 Score 0   PHQ-2 Total Score (12-17 Years)- Positive if 3 or more points; Administer PHQ-A if positive -   Q1: Little interest or pleasure in doing things Not at all   Q2: Feeling down, depressed or hopeless Not at all   PHQ-2 Score 0       Abuse: Current or Past (Physical, Sexual or Emotional) - No  Do you feel safe in your environment? Yes    Have you ever done Advance Care Planning? (For example, a Health Directive, POLST, or a discussion with a medical provider or your loved ones about your wishes): No, advance care planning information given to patient to review.  Patient declined advance care planning discussion at this time.    Social History     Tobacco Use     Smoking status: Never Smoker     Smokeless tobacco: Never Used   Substance Use Topics     Alcohol use: Yes     Alcohol/week: 0.0 standard drinks     Comment: once a month         Alcohol Use 3/20/2022   Prescreen: >3  drinks/day or >7 drinks/week? No   Prescreen: >3 drinks/day or >7 drinks/week? -       Reviewed orders with patient.  Reviewed health maintenance and updated orders accordingly - Yes  Lab work is in process    Breast Cancer Screening:    FHS-7:   Breast CA Risk Assessment (FHS-7) 3/20/2022   Did any of your first-degree relatives have breast or ovarian cancer? No   Did any man in your family have breast cancer? No   Did any woman in your family have breast and ovarian cancer? No   Did any woman in your family have breast cancer before age 50 y? No   Do you have 2 or more relatives with breast and/or ovarian cancer? No   Do you have 2 or more relatives with breast and/or bowel cancer? No         Pertinent mammograms are reviewed under the imaging tab.    History of abnormal Pap smear: NO - age 30-65 PAP every 5 years with negative HPV co-testing recommended  PAP / HPV Latest Ref Rng & Units 3/2/2021 12/13/2019 12/10/2018   PAP (Historical) - NIL NIL NIL   HPV16 NEG:Negative Positive(A) Positive(A) Positive(A)   HPV18 NEG:Negative Negative Negative Negative   HRHPV NEG:Negative Negative Negative Negative     Reviewed and updated as needed this visit by clinical staff    Allergies  Meds     Fam Hx       Araceli Gupta, APRN CNP on 3/21/2022 at 8:07 AM      Reviewed and updated as needed this visit by Provider     Meds                 Review of Systems   Constitutional: Negative for chills and fever.   HENT: Positive for hearing loss. Negative for congestion, ear pain and sore throat.    Eyes: Negative for pain and visual disturbance.   Respiratory: Negative for cough and shortness of breath.    Cardiovascular: Negative for chest pain, palpitations and peripheral edema.   Gastrointestinal: Negative for abdominal pain, constipation, diarrhea, heartburn, hematochezia and nausea.   Breasts:  Negative for tenderness, breast mass and discharge.   Genitourinary: Negative for dysuria, frequency, genital sores,  "hematuria, pelvic pain, urgency, vaginal bleeding and vaginal discharge.   Musculoskeletal: Negative for arthralgias, joint swelling and myalgias.   Skin: Negative for rash.   Neurological: Negative for dizziness, weakness, headaches and paresthesias.   Psychiatric/Behavioral: Negative for mood changes. The patient is not nervous/anxious.           OBJECTIVE:   /68 (BP Location: Right arm, Patient Position: Sitting, Cuff Size: Adult Regular)   Pulse 85   Temp 97.9  F (36.6  C) (Tympanic)   Ht 1.626 m (5' 4\")   Wt 78.8 kg (173 lb 11.2 oz)   SpO2 100%   BMI 29.82 kg/m    Physical Exam  GENERAL: healthy, alert and no distress  EYES: Eyes grossly normal to inspection, PERRL and conjunctivae and sclerae normal  HENT: ear canals and TM's normal, nose and mouth without ulcers or lesions  NECK: no adenopathy, no asymmetry, masses, or scars and thyroid normal to palpation  RESP: lungs clear to auscultation - no rales, rhonchi or wheezes  CV: regular rate and rhythm, normal S1 S2, no S3 or S4, no murmur, click or rub, no peripheral edema and peripheral pulses strong   (female): normal female external genitalia, normal urethral meatus, vaginal mucosa, normal cervix/adnexa/uterus without masses or discharge  MS: no gross musculoskeletal defects noted, no edema  PSYCH: mentation appears normal, affect normal/bright        ASSESSMENT/PLAN:   (Z00.00) Routine general medical examination at a health care facility  (primary encounter diagnosis)  Comment:   Plan: Lipid panel reflex to direct LDL Fasting,         Glucose            (Z11.4) Screening for HIV (human immunodeficiency virus)  Comment:   Plan: HIV Antigen Antibody Combo            (Z11.59) Need for hepatitis C screening test  Comment:   Plan: Hepatitis C Screen Reflex to HCV RNA Quant and         Genotype            (Z12.4) Cervical cancer screening  Comment:   Plan: Pap Screen with HPV - recommended age 30 - 65         years            (F41.9) " "Anxiety  Comment: stable, no chhanges  Plan: sertraline (ZOLOFT) 50 MG tablet          (H90.11) Conductive hearing loss of right ear, unspecified hearing status on contralateral side  Comment: Will hae hearing aid  Plan:     (H40.039) Anatomical narrow angle  Comment: stable  Plan:     (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    Weight as of this encounter: 78.8 kg (173 lb 11.2 oz).        She reports that she has never smoked. She has never used smokeless tobacco.      Counseling Resources:  ATP IV Guidelines  Pooled Cohorts Equation Calculator  Breast Cancer Risk Calculator  BRCA-Related Cancer Risk Assessment: FHS-7 Tool  FRAX Risk Assessment  ICSI Preventive Guidelines  Dietary Guidelines for Americans, 2010  USDA's MyPlate  ASA Prophylaxis  Lung CA Screening    NORBERTO Fofana [facility] NUNU  "

## 2022-03-22 ASSESSMENT — ANXIETY QUESTIONNAIRES: GAD7 TOTAL SCORE: 5

## 2022-03-23 LAB
BKR LAB AP GYN ADEQUACY: NORMAL
BKR LAB AP GYN INTERPRETATION: NORMAL
BKR LAB AP HPV REFLEX: NORMAL
BKR LAB AP PREVIOUS ABNL DX: NORMAL
BKR LAB AP PREVIOUS ABNORMAL: NORMAL
PATH REPORT.COMMENTS IMP SPEC: NORMAL
PATH REPORT.COMMENTS IMP SPEC: NORMAL
PATH REPORT.RELEVANT HX SPEC: NORMAL

## 2022-03-25 LAB
HUMAN PAPILLOMA VIRUS 16 DNA: NEGATIVE
HUMAN PAPILLOMA VIRUS 18 DNA: NEGATIVE
HUMAN PAPILLOMA VIRUS FINAL DIAGNOSIS: NORMAL
HUMAN PAPILLOMA VIRUS OTHER HR: NEGATIVE

## 2022-03-28 ENCOUNTER — PATIENT OUTREACH (OUTPATIENT)
Dept: PEDIATRICS | Facility: CLINIC | Age: 32
End: 2022-03-28
Payer: COMMERCIAL

## 2022-03-28 DIAGNOSIS — N87.1 MODERATE DYSPLASIA OF CERVIX (CIN II): ICD-10-CM

## 2022-07-13 ENCOUNTER — OFFICE VISIT (OUTPATIENT)
Dept: OPHTHALMOLOGY | Facility: CLINIC | Age: 32
End: 2022-07-13
Payer: COMMERCIAL

## 2022-07-13 DIAGNOSIS — H40.039 ANATOMICAL NARROW ANGLE: Primary | ICD-10-CM

## 2022-07-13 DIAGNOSIS — H21.542 POSTERIOR SYNECHIAE (IRIS), LEFT EYE: ICD-10-CM

## 2022-07-13 PROCEDURE — 92012 INTRM OPH EXAM EST PATIENT: CPT | Performed by: STUDENT IN AN ORGANIZED HEALTH CARE EDUCATION/TRAINING PROGRAM

## 2022-07-13 ASSESSMENT — CUP TO DISC RATIO
OS_RATIO: 0.1
OD_RATIO: 0.1

## 2022-07-13 ASSESSMENT — REFRACTION_MANIFEST
OD_CYLINDER: +0.50
OS_CYLINDER: SPHERE
OD_AXIS: 023
OD_SPHERE: -0.50
OS_SPHERE: -0.50

## 2022-07-13 ASSESSMENT — VISUAL ACUITY
METHOD: SNELLEN - LINEAR
OD_SC: 20/25
OS_SC: 20/20

## 2022-07-13 ASSESSMENT — CONF VISUAL FIELD
OS_NORMAL: 1
METHOD: COUNTING FINGERS
OD_NORMAL: 1

## 2022-07-13 ASSESSMENT — SLIT LAMP EXAM - LIDS
COMMENTS: NORMAL
COMMENTS: NORMAL

## 2022-07-13 ASSESSMENT — TONOMETRY
OS_IOP_MMHG: 15
IOP_METHOD: APPLANATION
OD_IOP_MMHG: 13

## 2022-07-13 ASSESSMENT — EXTERNAL EXAM - LEFT EYE: OS_EXAM: NORMAL

## 2022-07-13 ASSESSMENT — EXTERNAL EXAM - RIGHT EYE: OD_EXAM: NORMAL

## 2022-07-13 NOTE — PROGRESS NOTES
" Current Eye Medications:  Visine (dryness) as needed both eyes      Subjective:  Complete eye exam. Vision is doing fine both eyes in distance and near. Only uses glasses at night for driving, forgot glasses today. No eye pain or discomfort in either eye.   Patient had ear surgery 7/21/21 and they gave patch for nausea during surgery. Vision went blurry in less then 24 hours after using patch. Blurred vision lasted for 1 days both eyes. Fine print said not to use patch if having narrow angles. Dr. Cotton wanted patient to see Dr. Carvajal because of narrow angles .     Objective:  See Ophthalmology Exam.      Assessment:  Connie Bhakta is a 31 year old female who presents with:   Encounter Diagnoses   Name Primary?     Anatomical narrow angle - Both Eyes Yes    Recommend YAG peripheral iridotomy both eyes.      Posterior synechiae (iris), left eye        Plan:  Use artificial tears up to four times a day (like Refresh Optive, Systane Balance, TheraTears, or generic artificial tears are ok. Avoid \"get the red out\" drops).    Recommend returning for YAG peripheral iridotomy laser right eye, then left eye 2-3 weeks later, then Intraocular pressure/dilation 2-3 weeks later    Darian Carvajal MD  [phone]      "

## 2022-07-13 NOTE — LETTER
"    7/13/2022         RE: Connie Bhakta  [address]   [facility] [place] [address]        Dear Colleague,    Thank you for referring your patient, Connie Bhakta, to the [facility]. Please see a copy of my visit note below.     Current Eye Medications:  Visine (dryness) as needed both eyes      Subjective:  Complete eye exam. Vision is doing fine both eyes in distance and near. Only uses glasses at night for driving, forgot glasses today. No eye pain or discomfort in either eye.   Patient had ear surgery 7/21/21 and they gave patch for nausea during surgery. Vision went blurry in less then 24 hours after using patch. Blurred vision lasted for 1 days both eyes. Fine print said not to use patch if having narrow angles. Dr. Cotton wanted patient to see Dr. Carvajal because of narrow angles .     Objective:  See Ophthalmology Exam.      Assessment:  Connie Bhakta is a 31 year old female who presents with:   Encounter Diagnoses   Name Primary?     Anatomical narrow angle - Both Eyes Yes    Recommend YAG peripheral iridotomy both eyes.      Posterior synechiae (iris), left eye        Plan:  Use artificial tears up to four times a day (like Refresh Optive, Systane Balance, TheraTears, or generic artificial tears are ok. Avoid \"get the red out\" drops).    Recommend returning for YAG peripheral iridotomy laser right eye, then left eye 2-3 weeks later, then Intraocular pressure/dilation 2-3 weeks later    Darian Carvajal MD  [phone]          Again, thank you for allowing me to participate in the care of your patient.        Sincerely,        Darian Carvajal MD    "

## 2022-07-13 NOTE — PATIENT INSTRUCTIONS
"Use artificial tears up to four times a day (like Refresh Optive, Systane Balance, TheraTears, or generic artificial tears are ok. Avoid \"get the red out\" drops).    Recommend returning for YAG peripheral iridotomy laser right eye, then left eye 2-3 weeks later, then Intraocular pressure/dilation 2-3 weeks later    Darian Carvajal MD  [phone]    "

## 2022-07-27 ASSESSMENT — GONIOSCOPY
OS_TEMPORAL: SLIT
OD_NASAL: GRADE 1
OS_SUPERIOR: GRADE 2
OD_INFERIOR: GRADE 1
OS_NASAL: GRADE 1
OD_SUPERIOR: GRADE 2
OS_INFERIOR: GRADE 1
OD_TEMPORAL: SLIT

## 2022-07-28 ENCOUNTER — OFFICE VISIT (OUTPATIENT)
Dept: OPHTHALMOLOGY | Facility: CLINIC | Age: 32
End: 2022-07-28
Payer: COMMERCIAL

## 2022-07-28 DIAGNOSIS — H21.542 POSTERIOR SYNECHIAE (IRIS), LEFT EYE: ICD-10-CM

## 2022-07-28 DIAGNOSIS — H40.039 ANATOMICAL NARROW ANGLE: Primary | ICD-10-CM

## 2022-07-28 DIAGNOSIS — H40.039 ANATOMICAL NARROW ANGLE: ICD-10-CM

## 2022-07-28 PROCEDURE — 66761 REVISION OF IRIS: CPT | Mod: RT | Performed by: STUDENT IN AN ORGANIZED HEALTH CARE EDUCATION/TRAINING PROGRAM

## 2022-07-28 PROCEDURE — 99207 PR NO CHARGE LOS: CPT | Performed by: STUDENT IN AN ORGANIZED HEALTH CARE EDUCATION/TRAINING PROGRAM

## 2022-07-28 ASSESSMENT — SLIT LAMP EXAM - LIDS
COMMENTS: NORMAL
COMMENTS: NORMAL

## 2022-07-28 ASSESSMENT — TONOMETRY
IOP_METHOD: APPLANATION
OD_IOP_MMHG: 13
OD_IOP_MMHG: 15
IOP_METHOD: APPLANATION
OS_IOP_MMHG: 15

## 2022-07-28 ASSESSMENT — VISUAL ACUITY
METHOD: SNELLEN - LINEAR
OS_SC: 20/25
OD_SC: 20/25

## 2022-07-28 ASSESSMENT — EXTERNAL EXAM - LEFT EYE: OS_EXAM: NORMAL

## 2022-07-28 ASSESSMENT — EXTERNAL EXAM - RIGHT EYE: OD_EXAM: NORMAL

## 2022-07-28 NOTE — LETTER
7/28/2022         RE: Connie Bhakta  [address]   [facility] [place] [address]        Dear Colleague,    Thank you for referring your patient, Connie Bhakta, to the [facility]. Please see a copy of my visit note below.     Current Eye Medications:  None     Subjective:  YAG peripheral iridotomy laser right eye. Vison is OK both eyes. No eye pain or discomfort in either eye.       Objective:  See Ophthalmology Exam.       Assessment:  Connie Bhakta is a 31 year old female who presents with:   Encounter Diagnoses   Name Primary?     Anatomical narrow angle - Both Eyes YAG peripheral iridotomy right eye performed today without complication.      Posterior synechiae (iris), left eye      Anatomical narrow angle        Plan:  Return for YAG peripheral iridotomy left eye as planned    Darian Carvajal MD  [phone]          Again, thank you for allowing me to participate in the care of your patient.        Sincerely,        Darian Carvajal MD

## 2022-07-28 NOTE — PROGRESS NOTES
Current Eye Medications:  None     Subjective:  YAG peripheral iridotomy laser right eye. Vison is OK both eyes. No eye pain or discomfort in either eye.       Objective:  See Ophthalmology Exam.       Assessment:  Connie Bhakta is a 31 year old female who presents with:   Encounter Diagnoses   Name Primary?     Anatomical narrow angle - Both Eyes YAG peripheral iridotomy right eye performed today without complication.      Posterior synechiae (iris), left eye      Anatomical narrow angle        Plan:  Return for YAG peripheral iridotomy left eye as planned    Darian Carvajal MD  [phone]

## 2022-08-02 ENCOUNTER — DOCUMENTATION ONLY (OUTPATIENT)
Dept: OPHTHALMOLOGY | Facility: CLINIC | Age: 32
End: 2022-08-02

## 2022-08-11 ENCOUNTER — OFFICE VISIT (OUTPATIENT)
Dept: OPHTHALMOLOGY | Facility: CLINIC | Age: 32
End: 2022-08-11
Payer: COMMERCIAL

## 2022-08-11 DIAGNOSIS — H40.039 ANATOMICAL NARROW ANGLE: ICD-10-CM

## 2022-08-11 DIAGNOSIS — H40.039 ANATOMICAL NARROW ANGLE: Primary | ICD-10-CM

## 2022-08-11 DIAGNOSIS — H21.542 POSTERIOR SYNECHIAE (IRIS), LEFT EYE: ICD-10-CM

## 2022-08-11 PROCEDURE — 66761 REVISION OF IRIS: CPT | Mod: LT | Performed by: STUDENT IN AN ORGANIZED HEALTH CARE EDUCATION/TRAINING PROGRAM

## 2022-08-11 PROCEDURE — 99207 PR NO CHARGE LOS: CPT | Performed by: STUDENT IN AN ORGANIZED HEALTH CARE EDUCATION/TRAINING PROGRAM

## 2022-08-11 ASSESSMENT — VISUAL ACUITY
OS_SC+: -1
OS_SC: 20/20
OD_SC: 20/20
METHOD: SNELLEN - LINEAR

## 2022-08-11 ASSESSMENT — TONOMETRY
IOP_METHOD: APPLANATION
OS_IOP_MMHG: 13
OS_IOP_MMHG: 15
OD_IOP_MMHG: 13
IOP_METHOD: APPLANATION

## 2022-08-11 ASSESSMENT — EXTERNAL EXAM - RIGHT EYE: OD_EXAM: NORMAL

## 2022-08-11 ASSESSMENT — EXTERNAL EXAM - LEFT EYE: OS_EXAM: NORMAL

## 2022-08-11 ASSESSMENT — SLIT LAMP EXAM - LIDS
COMMENTS: NORMAL
COMMENTS: NORMAL

## 2022-08-11 NOTE — PROGRESS NOTES
Current Eye Medications:  none    Subjective:  Here for Yag laser iridotomy left eye.   She has no complaints today.     Sachi Will, MORRO  12:53 PM 08/11/2022    Objective:  See Ophthalmology Exam.      Assessment:  Connie Bhakta is a 31 year old female who presents with:   Encounter Diagnoses   Name Primary?     Anatomical narrow angle - Both Eyes s/p YAG PI OD Yes    YAG peripheral iridotomy patent right eye.     YAG peripheral iridotomy performed left eye without complication today,     Posterior synechiae (iris), left eye        Plan:  Return for rechecks of YAG Peripheral Iridotomies in both eyes, pressure check, and dilation as scheduled     Darian Carvajal MD  [phone]

## 2022-08-11 NOTE — LETTER
8/11/2022         RE: Connie Bhakta  [address]   [facility] [place] [address]        Dear Colleague,    Thank you for referring your patient, Connie Bhakta, to the [facility]. Please see a copy of my visit note below.    Current Eye Medications:  none    Subjective:  Here for Yag laser iridotomy left eye.   She has no complaints today.     MORRO Gutierrez  12:53 PM 08/11/2022    Objective:  See Ophthalmology Exam.      Assessment:  Connie Bhakta is a 31 year old female who presents with:   Encounter Diagnoses   Name Primary?     Anatomical narrow angle - Both Eyes s/p YAG PI OD Yes    YAG peripheral iridotomy patent right eye.     YAG peripheral iridotomy performed left eye without complication today,     Posterior synechiae (iris), left eye        Plan:  Return for rechecks of YAG Peripheral Iridotomies in both eyes, pressure check, and dilation as scheduled     Darian Carvajal MD  [phone]        Again, thank you for allowing me to participate in the care of your patient.        Sincerely,        Darian Carvajal MD

## 2022-08-11 NOTE — PATIENT INSTRUCTIONS
Return for rechecks of YAG Peripheral Iridotomies in both eyes, pressure check, and dilation as scheduled     Darian Carvajal MD  [phone]

## 2022-08-23 ENCOUNTER — OFFICE VISIT (OUTPATIENT)
Dept: OPHTHALMOLOGY | Facility: CLINIC | Age: 32
End: 2022-08-23
Payer: COMMERCIAL

## 2022-08-23 DIAGNOSIS — H40.039 ANATOMICAL NARROW ANGLE: Primary | ICD-10-CM

## 2022-08-23 DIAGNOSIS — H21.542 POSTERIOR SYNECHIAE (IRIS), LEFT EYE: ICD-10-CM

## 2022-08-23 PROCEDURE — 92014 COMPRE OPH EXAM EST PT 1/>: CPT | Performed by: STUDENT IN AN ORGANIZED HEALTH CARE EDUCATION/TRAINING PROGRAM

## 2022-08-23 ASSESSMENT — CUP TO DISC RATIO
OS_RATIO: 0.2
OD_RATIO: 0.2

## 2022-08-23 ASSESSMENT — VISUAL ACUITY
OS_SC: 20/20
OD_SC: 20/20
OD_SC+: -2
METHOD: SNELLEN - LINEAR

## 2022-08-23 ASSESSMENT — TONOMETRY
IOP_METHOD: APPLANATION
OS_IOP_MMHG: 16
OD_IOP_MMHG: 14

## 2022-08-23 ASSESSMENT — SLIT LAMP EXAM - LIDS
COMMENTS: NORMAL
COMMENTS: NORMAL

## 2022-08-23 ASSESSMENT — EXTERNAL EXAM - RIGHT EYE: OD_EXAM: NORMAL

## 2022-08-23 ASSESSMENT — EXTERNAL EXAM - LEFT EYE: OS_EXAM: NORMAL

## 2022-08-23 NOTE — PATIENT INSTRUCTIONS
Continue artificial tears up to four times a day as needed     Return in 1 year for complete eye exam (ok to have done with Dr. Jeanette Cotton)    Darian Carvajal MD  [phone]

## 2022-08-23 NOTE — LETTER
8/23/2022         RE: Connie Bhakta  [address]   [facility] [place] [address]        Dear Colleague,    Thank you for referring your patient, Connie Bhakta, to the [facility]. Please see a copy of my visit note below.     Current Eye Medications:  Artificial tears for seasonal allergies, as needed.     Subjective:  Status/Post Bilateral YAG peripheral Iridotomy.  Patient is here for a recheck, a pressure check, and dilation.  No vision changes or concerns.      Objective:  See Ophthalmology Exam.       Assessment:  Connie Bhakta is a 31 year old female who presents with:   Encounter Diagnoses   Name Primary?     Anatomical narrow angle - Both Eyes s/p YAG PI OU Yes    Patent peripheral iridotomies both eyes.      Posterior synechiae (iris), left eye        Plan:  Continue artificial tears up to four times a day as needed     Return in 1 year for complete eye exam (ok to have done with Dr. Jeanette Cotton)    Darian Carvajal MD  [phone]          Again, thank you for allowing me to participate in the care of your patient.        Sincerely,        Darian Carvajal MD

## 2022-08-23 NOTE — PROGRESS NOTES
Current Eye Medications:  Artificial tears for seasonal allergies, as needed.     Subjective:  Status/Post Bilateral YAG peripheral Iridotomy.  Patient is here for a recheck, a pressure check, and dilation.  No vision changes or concerns.      Objective:  See Ophthalmology Exam.       Assessment:  Connie Bhakta is a 31 year old female who presents with:   Encounter Diagnoses   Name Primary?     Anatomical narrow angle - Both Eyes s/p YAG PI OU Yes    Patent peripheral iridotomies both eyes.      Posterior synechiae (iris), left eye        Plan:  Continue artificial tears up to four times a day as needed     Return in 1 year for complete eye exam (ok to have done with Dr. Jeanette Cotton)    Darian Carvajal MD  [phone]

## 2022-09-11 ENCOUNTER — HEALTH MAINTENANCE LETTER (OUTPATIENT)
Age: 32
End: 2022-09-11

## 2023-04-20 ENCOUNTER — TRANSFERRED RECORDS (OUTPATIENT)
Dept: HEALTH INFORMATION MANAGEMENT | Facility: CLINIC | Age: 33
End: 2023-04-20

## 2023-05-01 ENCOUNTER — HOSPITAL ENCOUNTER (OUTPATIENT)
Dept: GENERAL RADIOLOGY | Facility: CLINIC | Age: 33
Discharge: HOME | End: 2023-05-01
Attending: NURSE PRACTITIONER | Admitting: NURSE PRACTITIONER
Payer: COMMERCIAL

## 2023-05-01 DIAGNOSIS — N97.9 INFERTILITY, FEMALE: ICD-10-CM

## 2023-05-01 PROCEDURE — 74740 X-RAY FEMALE GENITAL TRACT: CPT

## 2023-05-01 PROCEDURE — 255N000002 HC RX 255 OP 636: Performed by: RADIOLOGY

## 2023-05-01 RX ORDER — IOPAMIDOL 510 MG/ML
100 INJECTION, SOLUTION INTRAVASCULAR ONCE
Status: COMPLETED | OUTPATIENT
Start: 2023-05-01 | End: 2023-05-01

## 2023-05-01 RX ADMIN — IOPAMIDOL 5 ML: 510 INJECTION, SOLUTION INTRAVASCULAR at 08:09

## 2023-05-06 ENCOUNTER — HEALTH MAINTENANCE LETTER (OUTPATIENT)
Age: 33
End: 2023-05-06

## 2023-06-22 ENCOUNTER — MYC MEDICAL ADVICE (OUTPATIENT)
Dept: PEDIATRICS | Facility: CLINIC | Age: 33
End: 2023-06-22
Payer: COMMERCIAL

## 2023-06-22 NOTE — TELEPHONE ENCOUNTER
See patient's [employer]hart message regarding her sertraline (ZOLOFT) 50 MG tablet medication   - Patient wondering if she can/should continue taking this medication while trying to conceive and/or during pregnancy     sertraline (ZOLOFT) 50 MG tablet 90 tablet 0 3/22/2023  No   Sig: TAKE ONE TABLET BY MOUTH ONCE DAILY     Araceli Leyva, APRN CNP, please review and advise.     Paula ARAGON RN   [facility]

## 2023-08-15 SDOH — ECONOMIC STABILITY: FOOD INSECURITY: WITHIN THE PAST 12 MONTHS, YOU WORRIED THAT YOUR FOOD WOULD RUN OUT BEFORE YOU GOT MONEY TO BUY MORE.: NEVER TRUE

## 2023-08-15 SDOH — HEALTH STABILITY: PHYSICAL HEALTH: ON AVERAGE, HOW MANY MINUTES DO YOU ENGAGE IN EXERCISE AT THIS LEVEL?: 20 MIN

## 2023-08-15 SDOH — HEALTH STABILITY: PHYSICAL HEALTH: ON AVERAGE, HOW MANY DAYS PER WEEK DO YOU ENGAGE IN MODERATE TO STRENUOUS EXERCISE (LIKE A BRISK WALK)?: 3 DAYS

## 2023-08-15 SDOH — ECONOMIC STABILITY: INCOME INSECURITY: IN THE LAST 12 MONTHS, WAS THERE A TIME WHEN YOU WERE NOT ABLE TO PAY THE MORTGAGE OR RENT ON TIME?: NO

## 2023-08-15 SDOH — ECONOMIC STABILITY: FOOD INSECURITY: WITHIN THE PAST 12 MONTHS, THE FOOD YOU BOUGHT JUST DIDN'T LAST AND YOU DIDN'T HAVE MONEY TO GET MORE.: NEVER TRUE

## 2023-08-15 SDOH — ECONOMIC STABILITY: INCOME INSECURITY: HOW HARD IS IT FOR YOU TO PAY FOR THE VERY BASICS LIKE FOOD, HOUSING, MEDICAL CARE, AND HEATING?: NOT HARD AT ALL

## 2023-08-15 ASSESSMENT — ENCOUNTER SYMPTOMS
FEVER: 0
ABDOMINAL PAIN: 0
MYALGIAS: 0
CHILLS: 0
SHORTNESS OF BREATH: 0
NERVOUS/ANXIOUS: 0
DYSURIA: 0
JOINT SWELLING: 0
HEARTBURN: 0
HEMATOCHEZIA: 0
PALPITATIONS: 0
COUGH: 0
NAUSEA: 0
ARTHRALGIAS: 0
BREAST MASS: 0
FREQUENCY: 0
HEMATURIA: 0
SORE THROAT: 0
HEADACHES: 0
DIARRHEA: 0
EYE PAIN: 0
DIZZINESS: 0
WEAKNESS: 0
CONSTIPATION: 0
PARESTHESIAS: 0

## 2023-08-15 ASSESSMENT — SOCIAL DETERMINANTS OF HEALTH (SDOH)
HOW OFTEN DO YOU GET TOGETHER WITH FRIENDS OR RELATIVES?: ONCE A WEEK
HOW OFTEN DO YOU ATTEND CHURCH OR RELIGIOUS SERVICES?: MORE THAN 4 TIMES PER YEAR
ARE YOU MARRIED, WIDOWED, DIVORCED, SEPARATED, NEVER MARRIED, OR LIVING WITH A PARTNER?: NEVER MARRIED
DO YOU BELONG TO ANY CLUBS OR ORGANIZATIONS SUCH AS CHURCH GROUPS UNIONS, FRATERNAL OR ATHLETIC GROUPS, OR SCHOOL GROUPS?: NO
IN A TYPICAL WEEK, HOW MANY TIMES DO YOU TALK ON THE PHONE WITH FAMILY, FRIENDS, OR NEIGHBORS?: ONCE A WEEK

## 2023-08-15 ASSESSMENT — LIFESTYLE VARIABLES
HOW OFTEN DO YOU HAVE A DRINK CONTAINING ALCOHOL: MONTHLY OR LESS
HOW OFTEN DO YOU HAVE SIX OR MORE DRINKS ON ONE OCCASION: NEVER
HOW MANY STANDARD DRINKS CONTAINING ALCOHOL DO YOU HAVE ON A TYPICAL DAY: 1 OR 2
AUDIT-C TOTAL SCORE: 1
SKIP TO QUESTIONS 9-10: 1

## 2023-08-17 ENCOUNTER — OFFICE VISIT (OUTPATIENT)
Dept: PEDIATRICS | Facility: CLINIC | Age: 33
End: 2023-08-17
Payer: COMMERCIAL

## 2023-08-17 VITALS
SYSTOLIC BLOOD PRESSURE: 102 MMHG | DIASTOLIC BLOOD PRESSURE: 62 MMHG | WEIGHT: 173.9 LBS | TEMPERATURE: 98.8 F | BODY MASS INDEX: 29.69 KG/M2 | RESPIRATION RATE: 16 BRPM | OXYGEN SATURATION: 98 % | HEART RATE: 90 BPM | HEIGHT: 64 IN

## 2023-08-17 DIAGNOSIS — H73.91 TM (TYMPANIC MEMBRANE DISORDER), RIGHT: ICD-10-CM

## 2023-08-17 DIAGNOSIS — Z00.00 ROUTINE GENERAL MEDICAL EXAMINATION AT A HEALTH CARE FACILITY: Primary | ICD-10-CM

## 2023-08-17 DIAGNOSIS — F41.9 ANXIETY: ICD-10-CM

## 2023-08-17 DIAGNOSIS — Z12.4 CERVICAL CANCER SCREENING: ICD-10-CM

## 2023-08-17 PROCEDURE — 99395 PREV VISIT EST AGE 18-39: CPT | Performed by: NURSE PRACTITIONER

## 2023-08-17 RX ORDER — LEVOTHYROXINE SODIUM 50 UG/1
TABLET ORAL
COMMUNITY
Start: 2023-04-16 | End: 2024-04-17

## 2023-08-17 ASSESSMENT — ANXIETY QUESTIONNAIRES
GAD7 TOTAL SCORE: 7
5. BEING SO RESTLESS THAT IT IS HARD TO SIT STILL: SEVERAL DAYS
1. FEELING NERVOUS, ANXIOUS, OR ON EDGE: SEVERAL DAYS
3. WORRYING TOO MUCH ABOUT DIFFERENT THINGS: SEVERAL DAYS
GAD7 TOTAL SCORE: 7
6. BECOMING EASILY ANNOYED OR IRRITABLE: SEVERAL DAYS
7. FEELING AFRAID AS IF SOMETHING AWFUL MIGHT HAPPEN: SEVERAL DAYS
2. NOT BEING ABLE TO STOP OR CONTROL WORRYING: SEVERAL DAYS
IF YOU CHECKED OFF ANY PROBLEMS ON THIS QUESTIONNAIRE, HOW DIFFICULT HAVE THESE PROBLEMS MADE IT FOR YOU TO DO YOUR WORK, TAKE CARE OF THINGS AT HOME, OR GET ALONG WITH OTHER PEOPLE: NOT DIFFICULT AT ALL

## 2023-08-17 ASSESSMENT — ENCOUNTER SYMPTOMS
HEMATOCHEZIA: 0
HEADACHES: 0
BREAST MASS: 0
CONSTIPATION: 0
EYE PAIN: 0
CHILLS: 0
SORE THROAT: 0
DIARRHEA: 0
NAUSEA: 0
JOINT SWELLING: 0
PALPITATIONS: 0
DYSURIA: 0
SHORTNESS OF BREATH: 0
NERVOUS/ANXIOUS: 0
WEAKNESS: 0
ABDOMINAL PAIN: 0
PARESTHESIAS: 0
HEMATURIA: 0
HEARTBURN: 0
ARTHRALGIAS: 0
COUGH: 0
FEVER: 0
MYALGIAS: 0
FREQUENCY: 0
DIZZINESS: 0

## 2023-08-17 ASSESSMENT — PATIENT HEALTH QUESTIONNAIRE - PHQ9: 5. POOR APPETITE OR OVEREATING: SEVERAL DAYS

## 2023-08-17 ASSESSMENT — PAIN SCALES - GENERAL: PAINLEVEL: NO PAIN (0)

## 2023-08-17 NOTE — PROGRESS NOTES
SUBJECTIVE:   CC: Connie is an 32 year old who presents for preventive health visit.       8/17/2023     1:41 PM   Additional Questions   Roomed by Amy Cotton CMA       Healthy Habits:     Getting at least 3 servings of Calcium per day:  Yes    Bi-annual eye exam:  Yes    Dental care twice a year:  Yes    Sleep apnea or symptoms of sleep apnea:  None    Diet:  Regular (no restrictions)    Frequency of exercise:  2-3 days/week    Duration of exercise:  15-30 minutes    Taking medications regularly:  Yes    Medication side effects:  Not applicable    Additional concerns today:  No  Declines pap today - had insemination procedure on Monday from fertility clinic - was advised to not have any pelvic/vaginal exam.had IUI.     She did start synthroid from OBGYN to help with fertility work up. 4/7/2023 2.83, 5/15/2023 1.4.     History of anxiety, is on zoloft, notes things have overall been good.         12/13/2021     8:39 AM 3/21/2022     8:47 AM 8/17/2023     2:36 PM   MIMI-7 SCORE   Total Score 6 (mild anxiety)     Total Score 6 5 7         Today's PHQ-2 Score:       8/17/2023     1:36 PM   PHQ-2 ( 1999 Pfizer)   Q1: Little interest or pleasure in doing things 0   Q2: Feeling down, depressed or hopeless 0   PHQ-2 Score 0   Q1: Little interest or pleasure in doing things Not at all   Q2: Feeling down, depressed or hopeless Not at all   PHQ-2 Score 0         -------------------------------------    Social History     Tobacco Use    Smoking status: Never    Smokeless tobacco: Never   Substance Use Topics    Alcohol use: Yes     Alcohol/week: 0.0 standard drinks of alcohol     Comment: once a month             8/15/2023    12:17 PM   Alcohol Use   Prescreen: >3 drinks/day or >7 drinks/week? No     Reviewed orders with patient.  Reviewed health maintenance and updated orders accordingly - Yes      Breast Cancer Screening:    FHS-7:       3/20/2022    11:45 AM 8/15/2023    12:21 PM   Breast CA Risk Assessment (FHS-7)    Did any of your first-degree relatives have breast or ovarian cancer? No No   Did any of your relatives have bilateral breast cancer?  No   Did any man in your family have breast cancer? No No   Did any woman in your family have breast and ovarian cancer? No No   Did any woman in your family have breast cancer before age 50 y? No No   Do you have 2 or more relatives with breast and/or ovarian cancer? No No   Do you have 2 or more relatives with breast and/or bowel cancer? No No         Pertinent mammograms are reviewed under the imaging tab.    History of abnormal Pap smear: YES - updated in Problem List and Health Maintenance accordingly      Latest Ref Rng & Units 3/21/2022     8:33 AM 3/2/2021    11:10 AM 3/2/2021     9:32 AM   PAP / HPV   PAP  Negative for Intraepithelial Lesion or Malignancy (NILM)      PAP (Historical)    NIL    HPV 16 DNA Negative Negative  Positive     HPV 18 DNA Negative Negative  Negative     Other HR HPV Negative Negative  Negative       Reviewed and updated as needed this visit by clinical staff   Tobacco  Allergies  Meds     Fam Hx          Reviewed and updated as needed this visit by Provider     Meds     [facility] Hx             Review of Systems   Constitutional:  Negative for chills and fever.   HENT:  Negative for congestion, ear pain, hearing loss and sore throat.    Eyes:  Negative for pain and visual disturbance.   Respiratory:  Negative for cough and shortness of breath.    Cardiovascular:  Negative for chest pain, palpitations and peripheral edema.   Gastrointestinal:  Negative for abdominal pain, constipation, diarrhea, heartburn, hematochezia and nausea.   Breasts:  Negative for tenderness, breast mass and discharge.   Genitourinary:  Negative for dysuria, frequency, genital sores, hematuria, pelvic pain, urgency, vaginal bleeding and vaginal discharge.   Musculoskeletal:  Negative for arthralgias, joint swelling and myalgias.   Skin:  Negative for rash.   Neurological:   "Negative for dizziness, weakness, headaches and paresthesias.   Psychiatric/Behavioral:  Negative for mood changes. The patient is not nervous/anxious.           OBJECTIVE:   /62 (BP Location: Right arm, Cuff Size: Adult Regular)   Pulse 90   Temp 98.8  F (37.1  C) (Tympanic)   Resp 16   Ht 1.623 m (5' 3.9\")   Wt 78.9 kg (173 lb 14.4 oz)   LMP 08/02/2023 (Exact Date)   SpO2 98%   BMI 29.94 kg/m    Physical Exam  GENERAL: healthy, alert and no distress  EYES: Eyes grossly normal to inspection, PERRL and conjunctivae and sclerae normal  HENT: ear canals and TM's normal, nose and mouth without ulcers or lesions  NECK: no adenopathy, no asymmetry, masses, or scars and thyroid normal to palpation  RESP: lungs clear to auscultation - no rales, rhonchi or wheezes  CV: regular rate and rhythm, normal S1 S2, no S3 or S4, no murmur, click or rub, no peripheral edema and peripheral pulses strong  MS: no gross musculoskeletal defects noted, no edema  PSYCH: mentation appears normal, affect normal/bright        ASSESSMENT/PLAN:   (Z00.00) Routine general medical examination at a health care facility  (primary encounter diagnosis)  Comment: just had IUI, will hold on pap for now  Plan:     (Z12.4) Cervical cancer screening  Comment:   Plan:     (F41.9) Anxiety  Comment: stalb on current regime  Plan: sertraline (ZOLOFT) 50 MG tablet                  COUNSELING:  Reviewed preventive health counseling, as reflected in patient instructions  Special attention given to:        Regular exercise       Healthy diet/nutrition       Contraception       Family planning       Folic Acid       Osteoporosis prevention/bone health      BMI:   Estimated body mass index is 29.94 kg/m  as calculated from the following:    Height as of this encounter: 1.623 m (5' 3.9\").    Weight as of this encounter: 78.9 kg (173 lb 14.4 oz).         She reports that she has never smoked. She has never used smokeless tobacco.          Araceli MANJARREZ" NORBERTO Gupta CNP  [facility] NUNU

## 2023-08-25 NOTE — TELEPHONE ENCOUNTER
Araclei,    Pt had a recent annual exam with you. Pap was not done due to recent IUI. When do you recommend pap be done? Should I send her a reminder in 1-2 months? Pap history below. Thanks!    Connie cMdaniels RN     11/21/11 [facility]IL pap @ age 21   12/27/11 [facility] done-DIPIKA 1  05/23/12 LSIL pap, +HPV HR  12/2012 [facility] pap, + HR HPV (type not specified)  1/2013 [facility] DIPIKA 1  5/2013 NIL pap  12/2013 NIL pap  12/12/14 NL pap neg HPV  09/16/16 NL pap, +HPV HR # 16 (@ age 25) pt. Plan colp   11/11/16 [facility] done. No bx taken. Plan: pap in 1 year  11/6/17 Dx pap- NIL, +HPV 16. Plan repeat pap in 1 year per provider.  12/10/18 Dx NIL pap, + HR HPV 16. Plan colposcopy or repeat pap in one year per provider  1/9/19 [facility] - no visible lesions, no bx. Plan repeat pap 1 year  12/13/19 NIL, +HPV 16. Plan: [facility]  2/12/20 [facility] Bx: DIPIKA 1 & 2, ECC: HSIL. Plan LEEP  03/06/20 LEEP Bx: DIPIKA 1, margins neg, ECC: neg. Plan 1 year cotest  03/2/21 NIL, +HPV 16. Plan [facility] bef 6/2/21   3/19/21 [facility]: Negative, ECC: neg. Plan 1 yr co-test.   3/21/22 NIL Pap, Neg HPV. Plan cotest in 1 year.   3/29/22 Pt viewed result on [facility].  8/17/23 Annual exam -  just had IUI, will hold on pap for now.

## 2023-09-25 ENCOUNTER — MEDICAL CORRESPONDENCE (OUTPATIENT)
Dept: HEALTH INFORMATION MANAGEMENT | Facility: CLINIC | Age: 33
End: 2023-09-25
Payer: COMMERCIAL

## 2023-09-25 ENCOUNTER — PATIENT OUTREACH (OUTPATIENT)
Dept: PEDIATRICS | Facility: CLINIC | Age: 33
End: 2023-09-25
Payer: COMMERCIAL

## 2023-09-25 DIAGNOSIS — N87.1 MODERATE DYSPLASIA OF CERVIX (CIN II): Primary | ICD-10-CM

## 2023-10-19 ENCOUNTER — VIRTUAL VISIT (OUTPATIENT)
Dept: OBGYN | Facility: CLINIC | Age: 33
End: 2023-10-19

## 2023-10-19 DIAGNOSIS — Z34.00 SUPERVISION OF NORMAL FIRST PREGNANCY: Primary | ICD-10-CM

## 2023-10-19 PROCEDURE — 99207 PR NO CHARGE NURSE ONLY: CPT

## 2023-10-19 RX ORDER — PNV NO.95/FERROUS FUM/FOLIC AC 28MG-0.8MG
TABLET ORAL
COMMUNITY

## 2023-10-19 RX ORDER — FAMOTIDINE 20 MG
TABLET ORAL
Status: ON HOLD | COMMUNITY
End: 2024-05-27

## 2023-10-19 NOTE — NURSING NOTE
NPN nurse visit done over the phone. Pt will be given NPN folder and book at her upcoming appt.   Discussed optional screening available to assess chromosomal anomalies. Questions answered. Pt advised to call the clinic if she has any questions or concerns related to her pregnancy. Prenatal labs will be obtained at her upcoming appt. New prenatal visit scheduled on 11/6/23 with Dr. Leonard.    8w0d    Last pap: 3/2022 WNL. Neg HPV        Patient supplied answers from flow sheet for:  Prenatal OB Questionnaire.  Past Medical History  Have you ever recieved care for your mental health? : No  Have you ever been in a major accident or suffered serious trauma?: No  Within the last year, has anyone hit, slapped, kicked or otherwise hurt you?: No  In the last year, has anyone forced you to have sex when you didn't want to?: No    Past Medical History 2   Have you ever received a blood transfusion?: No  Would you accept a blood transfusion if was medically recommended?: Yes  Does anyone in your home smoke?: No   Is your blood type Rh negative?: No  Have you ever ?: No  Have you been hospitalized for a nonsurgical reason excluding normal delivery?: No  Have you ever had an abnormal pap smear?: (!) Yes    Past Medical History (Continued)  Do you have a history of abnormalities of the uterus?: No  Did your mother take CAMRON or any other hormones when she was pregnant with you?: No  Do you have any other problems we have not asked about which you feel may be important to this pregnancy?: No         Denisa DEE RN

## 2023-10-23 PROBLEM — R87.810 CERVICAL HIGH RISK HPV (HUMAN PAPILLOMAVIRUS) TEST POSITIVE: Chronic | Status: ACTIVE | Noted: 2019-01-09

## 2023-11-02 LAB
ABO/RH(D): NORMAL
ANTIBODY SCREEN: NEGATIVE
SPECIMEN EXPIRATION DATE: NORMAL

## 2023-11-03 ENCOUNTER — LAB (OUTPATIENT)
Dept: LAB | Facility: CLINIC | Age: 33
End: 2023-11-03
Payer: COMMERCIAL

## 2023-11-03 ENCOUNTER — ANCILLARY PROCEDURE (OUTPATIENT)
Dept: ULTRASOUND IMAGING | Facility: CLINIC | Age: 33
End: 2023-11-03
Payer: COMMERCIAL

## 2023-11-03 DIAGNOSIS — Z34.00 SUPERVISION OF NORMAL FIRST PREGNANCY: ICD-10-CM

## 2023-11-03 LAB
ERYTHROCYTE [DISTWIDTH] IN BLOOD BY AUTOMATED COUNT: 12.6 % (ref 10–15)
HCT VFR BLD AUTO: 37.3 % (ref 35–47)
HGB BLD-MCNC: 12.7 G/DL (ref 11.7–15.7)
MCH RBC QN AUTO: 30.1 PG (ref 26.5–33)
MCHC RBC AUTO-ENTMCNC: 34 G/DL (ref 31.5–36.5)
MCV RBC AUTO: 88 FL (ref 78–100)
PLATELET # BLD AUTO: 304 10E3/UL (ref 150–450)
RBC # BLD AUTO: 4.22 10E6/UL (ref 3.8–5.2)
T PALLIDUM AB SER QL: NONREACTIVE
WBC # BLD AUTO: 9.1 10E3/UL (ref 4–11)

## 2023-11-03 PROCEDURE — 86850 RBC ANTIBODY SCREEN: CPT

## 2023-11-03 PROCEDURE — 76801 OB US < 14 WKS SINGLE FETUS: CPT | Performed by: OBSTETRICS & GYNECOLOGY

## 2023-11-03 PROCEDURE — 87340 HEPATITIS B SURFACE AG IA: CPT

## 2023-11-03 PROCEDURE — 86803 HEPATITIS C AB TEST: CPT

## 2023-11-03 PROCEDURE — 85027 COMPLETE CBC AUTOMATED: CPT

## 2023-11-03 PROCEDURE — 86762 RUBELLA ANTIBODY: CPT

## 2023-11-03 PROCEDURE — 86901 BLOOD TYPING SEROLOGIC RH(D): CPT

## 2023-11-03 PROCEDURE — 36415 COLL VENOUS BLD VENIPUNCTURE: CPT

## 2023-11-03 PROCEDURE — 86900 BLOOD TYPING SEROLOGIC ABO: CPT

## 2023-11-03 PROCEDURE — 86780 TREPONEMA PALLIDUM: CPT

## 2023-11-03 PROCEDURE — 87086 URINE CULTURE/COLONY COUNT: CPT

## 2023-11-03 PROCEDURE — 87389 HIV-1 AG W/HIV-1&-2 AB AG IA: CPT

## 2023-11-03 PROCEDURE — 87088 URINE BACTERIA CULTURE: CPT

## 2023-11-04 LAB
BACTERIA UR CULT: ABNORMAL
BACTERIA UR CULT: ABNORMAL
HBV SURFACE AG SERPL QL IA: NONREACTIVE
HCV AB SERPL QL IA: NONREACTIVE
HIV 1+2 AB+HIV1 P24 AG SERPL QL IA: NONREACTIVE

## 2023-11-05 DIAGNOSIS — R82.71 GBS BACTERIURIA: Primary | ICD-10-CM

## 2023-11-05 RX ORDER — AMOXICILLIN 875 MG
875 TABLET ORAL 2 TIMES DAILY
Qty: 14 TABLET | Refills: 0 | Status: SHIPPED | OUTPATIENT
Start: 2023-11-05 | End: 2023-11-12

## 2023-11-06 ENCOUNTER — PRENATAL OFFICE VISIT (OUTPATIENT)
Dept: OBGYN | Facility: CLINIC | Age: 33
End: 2023-11-06
Payer: COMMERCIAL

## 2023-11-06 VITALS
WEIGHT: 179 LBS | BODY MASS INDEX: 31.71 KG/M2 | DIASTOLIC BLOOD PRESSURE: 62 MMHG | SYSTOLIC BLOOD PRESSURE: 110 MMHG | HEIGHT: 63 IN

## 2023-11-06 DIAGNOSIS — Z98.890 HISTORY OF LOOP ELECTROSURGICAL EXCISION PROCEDURE (LEEP) OF CERVIX AFFECTING PREGNANCY IN FIRST TRIMESTER: Primary | ICD-10-CM

## 2023-11-06 DIAGNOSIS — O34.41 HISTORY OF LOOP ELECTROSURGICAL EXCISION PROCEDURE (LEEP) OF CERVIX AFFECTING PREGNANCY IN FIRST TRIMESTER: Primary | ICD-10-CM

## 2023-11-06 DIAGNOSIS — N87.1 MODERATE DYSPLASIA OF CERVIX (CIN II): ICD-10-CM

## 2023-11-06 DIAGNOSIS — Z11.3 SCREEN FOR STD (SEXUALLY TRANSMITTED DISEASE): ICD-10-CM

## 2023-11-06 DIAGNOSIS — R82.71 GBS BACTERIURIA: ICD-10-CM

## 2023-11-06 DIAGNOSIS — Z23 ENCOUNTER FOR IMMUNIZATION: ICD-10-CM

## 2023-11-06 DIAGNOSIS — R87.810 CERVICAL HIGH RISK HPV (HUMAN PAPILLOMAVIRUS) TEST POSITIVE: ICD-10-CM

## 2023-11-06 PROBLEM — O34.40 HX LEEP (LOOP ELECTROSURGICAL EXCISION PROCEDURE), CERVIX, PREGNANCY: Status: ACTIVE | Noted: 2023-11-06

## 2023-11-06 LAB
RUBV IGG SERPL QL IA: 0.97 INDEX
RUBV IGG SERPL QL IA: NORMAL

## 2023-11-06 PROCEDURE — 99207 PR FIRST OB VISIT: CPT | Performed by: OBSTETRICS & GYNECOLOGY

## 2023-11-06 PROCEDURE — 88175 CYTOPATH C/V AUTO FLUID REDO: CPT | Performed by: OBSTETRICS & GYNECOLOGY

## 2023-11-06 PROCEDURE — 36415 COLL VENOUS BLD VENIPUNCTURE: CPT | Performed by: OBSTETRICS & GYNECOLOGY

## 2023-11-06 PROCEDURE — 91320 SARSCV2 VAC 30MCG TRS-SUC IM: CPT | Performed by: OBSTETRICS & GYNECOLOGY

## 2023-11-06 PROCEDURE — 90480 ADMN SARSCOV2 VAC 1/ONLY CMP: CPT | Performed by: OBSTETRICS & GYNECOLOGY

## 2023-11-06 PROCEDURE — 87591 N.GONORRHOEAE DNA AMP PROB: CPT | Performed by: OBSTETRICS & GYNECOLOGY

## 2023-11-06 PROCEDURE — 87624 HPV HI-RISK TYP POOLED RSLT: CPT | Performed by: OBSTETRICS & GYNECOLOGY

## 2023-11-06 PROCEDURE — 87491 CHLMYD TRACH DNA AMP PROBE: CPT | Performed by: OBSTETRICS & GYNECOLOGY

## 2023-11-06 NOTE — PROGRESS NOTES
This is a 33 year old female patient,   who presents for her first obstetrical visit. This pregnancy is Planned, Desired.    EDC May 30, 2024 by IUI at [facility] which makes her 10w4d  today.  Her cycles are regular.  Her last menstrual period was normal. Since her LMP, she has experienced  nausea.  She denies vaginal discharge, pelvic pain, and vaginal bleeding. Ultrasound in the 1st trimester showed EDC consistent with dates by IUI.     OB History    Para Term  AB Living   1 0 0 0 0 0   SAB IAB Ectopic Multiple Live Births   0 0 0 0 0      # Outcome Date GA Lbr Alexx/2nd Weight Sex Delivery Anes PTL Lv   1 Current                History of GDM: No,  PTL : No,  History of HTN in pregnancy: No,  Thrombocytopenia: No,  Shoulder dystocia: No,  Vacuum Extraction: No  PPH: No   3rd of 4th degree laceration: No.   Other complications: No      Since her last LMP she denies use of alcohol, tobacco and street drugs.    HISTORY:  Past Medical History:   Diagnosis Date    Anxiety     High risk HPV infection , 16, 17, 12/10/18, 19    see problem list    History of colposcopy 2011, 2013, 2016, 2019, 20    see problem list    History of human papillomavirus infection     Papanicolaou smear of cervix with low grade squamous intraepithelial lesion (LGSIL) 11,12    Thyroid disease     Varicella      Past Surgical History:   Procedure Laterality Date    AS TMJ ARTHROSCOPY/SURGERY Right 2012    hx of R TM tubes and surgery for eustacian tube    BIOPSY  2021    Colposcopy    COLPOSCOPY,BX CERVIX/ENDOCERV CURR      x 2    COLPOSCOPY,LOOP ELECTRD CERVIX EXCIS  2020    DIPIKA 2 - Path showed only DIPIKA 1 on ectoCx LEEP, neg margins    ENT SURGERY  2021     Family History   Problem Relation Age of Onset    Glaucoma Mother     Thyroid Disease Mother     Rectal Cancer Father     Diabetes Father     Colon Cancer Father 55        Stage 3 rectal cancer 2018    No  Known Problems Sister     Diabetes Maternal Grandmother     Lung Cancer Paternal Grandmother     Macular Degeneration No family hx of      Social History     Socioeconomic History    Marital status: Single     Spouse name: None    Number of children: None    Years of education: None    Highest education level: None   Occupational History    Occupation: Human Resources     Employer: [employer]   Tobacco Use    Smoking status: Never    Smokeless tobacco: Never   Vaping Use    Vaping Use: Never used   Substance and Sexual Activity    Alcohol use: Not Currently     Comment: once a month    Drug use: No    Sexual activity: Yes     Partners: Male   Other Topics Concern    Parent/sibling w/ CABG, MI or angioplasty before 65F 55M? No     Social Determinants of Health     Financial Resource Strain: Low Risk  (8/15/2023)    Overall Financial Resource Strain (CARDIA)     Difficulty of Paying Living Expenses: Not hard at all   Food Insecurity: No Food Insecurity (8/15/2023)    Hunger Vital Sign     Worried About Running Out of Food in the Last Year: Never true     Ran Out of Food in the Last Year: Never true   Transportation Needs: No Transportation Needs (8/15/2023)    PRAPARE - Transportation     Lack of Transportation (Medical): No     Lack of Transportation (Non-Medical): No   Physical Activity: Insufficiently Active (8/15/2023)    Exercise Vital Sign     Days of Exercise per Week: 3 days     Minutes of Exercise per Session: 20 min   Stress: No Stress Concern Present (8/15/2023)    [employer] - Occupational Stress Questionnaire     Feeling of Stress : Only a little   Social Connections: Socially Isolated (8/15/2023)    Social Connection and Isolation Panel [NHANES]     Frequency of Communication with Friends and Family: Once a week     Frequency of Social Gatherings with Friends and Family: Once a week     Attends Sikhism Services: More than 4 times per year     Active Member of Clubs or  "Organizations: No     Marital Status: Never    Housing Stability: Low Risk  (8/15/2023)    Housing Stability Vital Sign     Unable to Pay for Housing in the Last Year: No     Number of Places Lived in the Last Year: 1     Unstable Housing in the Last Year: No     Current Outpatient Medications   Medication Sig    amoxicillin (AMOXIL) 875 MG tablet Take 1 tablet (875 mg) by mouth 2 times daily for 7 days    levothyroxine (SYNTHROID/LEVOTHROID) 50 MCG tablet     Prenatal Vit-Fe Fumarate-FA (PRENATAL VITAMIN) 27-0.8 MG TABS     sertraline (ZOLOFT) 50 MG tablet Take 1 tablet (50 mg) by mouth daily    Vitamin D, Cholecalciferol, 25 MCG (1000 UT) CAPS      No current facility-administered medications for this visit.     No Known Allergies    Past medical, surgical, social and family history were reviewed and updated in EPIC.    ROS:   12 point review of systems negative other than symptoms noted below.    EXAM:  /62 (BP Location: Right arm, Patient Position: Sitting, Cuff Size: Adult Regular)   Ht 1.6 m (5' 3\")   Wt 81.2 kg (179 lb)   LMP 08/26/2023   BMI 31.71 kg/m     BMI: Body mass index is 31.71 kg/m .    EXAM:  Constitutional: Appearance: Well nourished, well developed alert, in no acute distress  Chest:  Respiratory Effort:  Breathing unlabored  Cardiovascular:Heart    Auscultation:  Regular rate, normal rhythm, no murmurs present  Gastrointestinal:  Abdominal Examination:  Abdomen nontender to palpation, tone normal without     rigidity or guarding, no masses present, umbilicus without lesions    Liver and speen:  No hepatomegaly present, liver nontender to palpation    Hernias:  No hernias present    FHTs auscultated at 165.  Skin:  General Inspection:  No rashes present, no lesions present, no areas of  discoloration.  Neurologic/Psychiatric:    Mental Status:  Oriented X3       Pelvis: External genitalia, Bartholin, urethral, and Gardiner glands within normal limits. Urethra is without lesion and " nontender to palpation. Bladder is nontender. On speculum exam, cervix is without lesion and vagina is normal without lesion or discharge. Pap smear, GC/Chlam obtained without incident.    ASSESSMENT:      ICD-10-CM    1. History of loop electrosurgical excision procedure (LEEP) of cervix affecting pregnancy in first trimester  O34.41 [facility] Referral - Pregnancy    Z98.890 Non Invasive Prenatal Test Cell Free DNA      2. GBS bacteriuria  R82.71       3. Cervical high risk HPV (human papillomavirus) test positive  R87.810 Pap imaged thin layer screen with HPV - recommended age 30 - 65 years (select HPV order below)      4. Screen for STD (sexually transmitted disease)  Z11.3 NEISSERIA GONORRHOEA PCR     CHLAMYDIA TRACHOMATIS PCR      5. Moderate dysplasia of cervix (DIPIKA II)  N87.1 Pap imaged thin layer screen with HPV - recommended age 30 - 65 years (select HPV order below)      6. Encounter for immunization  Z23 COVID-19 12+ (2023-24) (PFIZER)          PLAN:    Prenatal labs reviewed. She has no questions.    PAP/HPV done.  Follow-up per [facility].    GC/Chlam done.    Discussed options for screening for and diagnosis of chromosomal anomalies, including first trimester screen, noninvasive prenatal testing/cell-free fetal DNA testing, CVS/amniocentesis, quad screen, and ultrasound or comprehensive Level II US at 18-20 weeks. She is electing first trimester screen, noninvasive prenatal testing, MsAFP at 16 weeks, Lvl 2 U/S at 20 weeks.    Reviewed early pregnancy education, diet, exercise, prenatal vitamins, intercourse. Reviewed the call schedule, labor and delivery, and the schedule of prenatal visits.    RTC 3 weeks. She is encouraged to call sooner with questions or concerns.      Domenic Leonard MD  [facility] [place]

## 2023-11-06 NOTE — NURSING NOTE
"Chief Complaint   Patient presents with    Prenatal Care     10 weeks 4 days- no concerns        Initial /62 (BP Location: Right arm, Patient Position: Sitting, Cuff Size: Adult Regular)   Ht 1.6 m (5' 3\")   Wt 81.2 kg (179 lb)   LMP 2023   BMI 31.71 kg/m   Estimated body mass index is 31.71 kg/m  as calculated from the following:    Height as of this encounter: 1.6 m (5' 3\").    Weight as of this encounter: 81.2 kg (179 lb).  BP completed using cuff size: regular    Questioned patient about current smoking habits.  Pt. has never smoked.          The following HM Due: NONE    10w4d  Evan Cotton CMA                "

## 2023-11-07 LAB
C TRACH DNA SPEC QL NAA+PROBE: NEGATIVE
N GONORRHOEA DNA SPEC QL NAA+PROBE: NEGATIVE

## 2023-11-08 ENCOUNTER — TELEPHONE (OUTPATIENT)
Dept: OBGYN | Facility: CLINIC | Age: 33
End: 2023-11-08
Payer: COMMERCIAL

## 2023-11-08 PROBLEM — O21.9 NAUSEA AND VOMITING DURING PREGNANCY: Status: ACTIVE | Noted: 2023-11-08

## 2023-11-09 LAB
BKR LAB AP GYN ADEQUACY: NORMAL
BKR LAB AP GYN INTERPRETATION: NORMAL
BKR LAB AP HPV REFLEX: NORMAL
BKR LAB AP LMP: NORMAL
BKR LAB AP PREVIOUS ABNL DX: NORMAL
BKR LAB AP PREVIOUS ABNORMAL: NORMAL
PATH REPORT.COMMENTS IMP SPEC: NORMAL
PATH REPORT.COMMENTS IMP SPEC: NORMAL
PATH REPORT.RELEVANT HX SPEC: NORMAL

## 2023-11-13 ENCOUNTER — PATIENT OUTREACH (OUTPATIENT)
Dept: OBGYN | Facility: CLINIC | Age: 33
End: 2023-11-13
Payer: COMMERCIAL

## 2023-11-13 LAB
HUMAN PAPILLOMA VIRUS 16 DNA: NEGATIVE
HUMAN PAPILLOMA VIRUS 18 DNA: NEGATIVE
HUMAN PAPILLOMA VIRUS FINAL DIAGNOSIS: NORMAL
HUMAN PAPILLOMA VIRUS OTHER HR: NEGATIVE
SCANNED LAB RESULT: NORMAL

## 2023-11-17 ENCOUNTER — PRE VISIT (OUTPATIENT)
Dept: MATERNAL FETAL MEDICINE | Facility: CLINIC | Age: 33
End: 2023-11-17
Payer: COMMERCIAL

## 2023-11-24 ENCOUNTER — MYC MEDICAL ADVICE (OUTPATIENT)
Dept: OBGYN | Facility: CLINIC | Age: 33
End: 2023-11-24
Payer: COMMERCIAL

## 2023-11-27 ENCOUNTER — HOSPITAL ENCOUNTER (OUTPATIENT)
Dept: ULTRASOUND IMAGING | Facility: CLINIC | Age: 33
Discharge: HOME | End: 2023-11-27
Attending: OBSTETRICS & GYNECOLOGY
Payer: COMMERCIAL

## 2023-11-27 ENCOUNTER — OFFICE VISIT (OUTPATIENT)
Dept: MATERNAL FETAL MEDICINE | Facility: CLINIC | Age: 33
End: 2023-11-27
Attending: OBSTETRICS & GYNECOLOGY
Payer: COMMERCIAL

## 2023-11-27 DIAGNOSIS — O26.90 PREGNANCY RELATED CONDITION, ANTEPARTUM: ICD-10-CM

## 2023-11-27 DIAGNOSIS — Z98.890 HISTORY OF LOOP ELECTROSURGICAL EXCISION PROCEDURE (LEEP) OF CERVIX AFFECTING PREGNANCY IN FIRST TRIMESTER: Primary | ICD-10-CM

## 2023-11-27 DIAGNOSIS — Z36.9 FIRST TRIMESTER SCREENING: ICD-10-CM

## 2023-11-27 DIAGNOSIS — O34.41 HISTORY OF LOOP ELECTROSURGICAL EXCISION PROCEDURE (LEEP) OF CERVIX AFFECTING PREGNANCY IN FIRST TRIMESTER: Primary | ICD-10-CM

## 2023-11-27 PROCEDURE — 76801 OB US < 14 WKS SINGLE FETUS: CPT | Mod: 26 | Performed by: OBSTETRICS & GYNECOLOGY

## 2023-11-27 PROCEDURE — 76801 OB US < 14 WKS SINGLE FETUS: CPT

## 2023-11-27 PROCEDURE — 96040 HC GENETIC COUNSELING, EACH 30 MINUTES: CPT

## 2023-11-27 NOTE — PROGRESS NOTES
Please refer to ultrasound report under 'Imaging' Studies of 'Chart Review' tabs.    Calvin Leyva M.D.

## 2023-11-27 NOTE — PROGRESS NOTES
"[facility]  Genetic Counseling Consult    Patient:  Connie Bhakta YOB: 1990   Date of Service:  23   MRN: [id number]    Connie was seen at the [facility] for genetic consultation. The indication for genetic counseling is desire to discuss options for genetic screening and diagnostics. The patient was accompanied to this visit by their sister, Haley.    The session was conducted in English.      IMPRESSION/ PLAN   1. Connie had genetic screening earlier in this pregnancy. Their non-invasive prenatal test was screen negative or low risk for screened conditions     2. During today's [facility] visit, Connie had a genetic counseling session only. Screening and diagnostic testing was discussed and declined.     3. Connie had a nuchal translucency ultrasound today. Please see the ultrasound report for further details.    4. Further recommendations include a fetal anatomy level II ultrasound with [facility]. The upcoming ultrasound has been scheduled for 01/15/2024.    PREGNANCY HISTORY   /Parity:       CURRENT PREGNANCY   Current Age: 33 year old     Age at Delivery: 33 year old    YAZAN: 2024, by Est. Date of Conception                                     Gestational Age: 13w4d    This pregnancy is a single gestation.     This pregnancy was conceived with intrauterine insemination (IUI) with donor sperm.     MEDICAL HISTORY   Connie s reported medical history is not expected to impact pregnancy management or risks to fetal development.         FAMILY HISTORY   A three-generation pedigree was obtained today and is scanned under the \"Media\" tab in Epic. The family history was reported by Connie.    The following significant findings were reported today:   The sperm donor was between ages 28-34 years. Connie reports that he self-reported good health. He also reported three alive and well siblings, a " healthy mother, and a father who is well other than hypertension.   Connie's father had colorectal cancer at age 55. He also has type 2 diabetes.  No other family history of cancer was reported. We discussed how most cancer seen in families occurs sporadically, but some may be due to an underlying genetic etiology. This family history is not suspicious of an underlying hereditary cancer predisposition, however Connie was encouraged to follow up if any new information arises.     Individuals with a family history of type II diabetes are generally thought to be at increased risk to develop type II diabetes.  Therefore, it is important for individuals with a family history of type II diabetes to let their physicians to know about this family history, so they can be appropriate screened for diabetes.  Connie's maternal half first cousin has autism spectrum disorder.  Autism spectrum disorders (ASD) are characterized by abnormalities in language and social cognition. Individuals with ASD typically have difficulty with communication and interaction with others, intellectual disability, restricted interests, and repetitive behaviors. The cause of ASD is currently unknown. In less than 1% of all individuals with ASD, it is a feature of a certain genetic condition such as Down syndrome, fragile X syndrome, or Rett syndrome. However, in most cases the cause may be multifactorial which means a combination of genetic and environmental factors. The risk for first degree relatives to be affected is elevated, however, Connie's affected relative is a fifth degree relative to the pregnancy; thus, the chance of her child being affected by autism is not greater than the general population's chance.    Otherwise, the reported family history is unremarkable for multiple miscarriages, stillbirths, birth defects, intellectual disabilities, known genetic conditions, and consanguinity.       RISK ASSESSMENT FOR CHROMOSOME CONDITIONS    We explained that the risk for fetal chromosome abnormalities increases with maternal age. We discussed specific features of common chromosome abnormalities, including Down syndrome, trisomy 13, trisomy 18, and sex chromosome trisomies.    At age 33 at midtrimester, the risk to have a baby with Down syndrome is 1 in 342.   At age 33 at midtrimester, the risk to have a baby with any chromosome abnormality is 1 in  172.     Connie had genetic screening earlier in this pregnancy. Their non-invasive prenatal test was screen negative or low risk for screened conditions     Non-invasive prenatal testing (NIPT) results  Maternal plasma cell-free DNA testing  Screens for fetal trisomy 21, trisomy 13, trisomy 18, and sex chromosome aneuploidy  First trimester ultrasound with was not performed but appeared normal.  Connie had a [facility] NIPT test earlier in pregnancy; we reviewed the results today, which are low risk.  The NIPT did include sex chromosome aneuploidies and the result was low risk. The predicted sex is XX, which is typically female.  Given the accuracy of this test, these results greatly decrease the chance for certain fetal chromosome abnormalities  We discussed the limitations of normal NIPT results  Maternal serum AFP only to screen for open neural tube defects (after 15 weeks) is not yet available due to early gestation but could be done after 15 weeks.     RISK ASSESSMENT FOR INHERITED CONDITIONS   We discussed that every pregnancy has a chance to have an inherited single-gene condition, even when there is no family history of that condition. In fact, approximately 90% of couples at an increased reproductive risk for an inherited condition have no family history of that condition. The average person may be a carrier for 5-10 different genetic variants that can increase the chance for their pregnancies to have that condition. We discussed autosomal recessive conditions and X-linked conditions. Autosomal  recessive conditions happen when a mutation has been inherited from the egg and sperm and include conditions like cystic fibrosis, thalassemia, hearing loss, spinal muscular atrophy, and more. X-linked conditions happen when a mutation has been inherited from the egg and include conditions like fragile X syndrome.     We reviewed that when both biological parents carry a harmful genetic change in a gene associated with autosomal recessive inheritance, each of their pregnancies has a 1 in 4 (25%) chance to be affected by that condition. With x-linked conditions, the specific risk generally depends on the chromosomal sex of the fetus, with XY individuals (generally male) being most severely affected.     The patient does NOT have a family history of known inherited conditions. This does NOT mean the patient and/or their partner is not a carrier of a condition. Approximately 90% of couples at an increased reproductive risk for an inherited condition have no family history of that condition. . 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 A copy of the report was not available for review today. The patient's sperm donor did complete carrier screening as well, which Connie reports was negative; a copy of his report was not available for my review.     GENETIC TESTING OPTIONS   Genetic testing during a pregnancy includes screening and diagnostic procedures.      Screening tests are non-invasive which means no risk to the pregnancy and includes ultrasounds and blood work. The benefits and limitations of screening were reviewed. Screening tests provide a risk assessment (chance) specific to the pregnancy for certain fetal chromosome abnormalities but cannot definitively diagnose or exclude a fetal chromosome abnormality. Follow-up genetic counseling and consideration of diagnostic testing is recommended with any abnormal screening result.  Diagnostic testing during a pregnancy is more certain and can test for more conditions. However, the tests do have a risk of miscarriage that requires careful consideration. These tests can detect fetal chromosome abnormalities with greater than 99% certainty. Results can be compromised by maternal cell contamination or mosaicism and are limited by the resolution of current genetic testing technology.     There is no screening or diagnostic test that detects all forms of birth defects or intellectual disability.     We discussed the following screening options:    Non-invasive prenatal testing (NIPT)  Also called cell-free DNA screening because it detect chromosome fragments from the placenta in the pregnant person's blood.  Can be done any time after 10 weeks gestation.  Screens for trisomy 21, trisomy 18, trisomy 13, and sex chromosome aneuploidies. ACMG also recommends consideration of screening for 22q11.2 microdeletion syndrome.  Does not screen for all known chromosomal conditions.  Even with negative results, a residual risk for screened conditions remains.  Cannot screen for open neural tube defects, maternal serum AFP after 15 weeks is recommended    Carrier screening  Risk assessment for certain autosomal recessive and x-linked conditions. These conditions are generally infantile- or childhood-onset conditions.   Can be done any time during the pregnancy or prior to pregnancy.  Can screen for over 500 different genetic conditions.  Is not intended to diagnosis a condition in the carrier parent.    Even with negative results, a residual risk for screened conditions remains.      We discussed the following ultrasound options:  Nuchal translucency (NT) ultrasound  Ultrasound between [id number] that includes nuchal translucency measurement and nasal bone assessments  Nuchal translucency refers to the space at the back of the neck where fluid builds up. All babies at this stage have fluid and there is only  concern if there is too much fluid  Nasal bone refers to the small bone in the nose. There is concern for conditions like Down syndrome if the bone cannot be seen at all  This ultrasound can be done as part of first trimester screening, at the same time as another screen (NIPT), at the same time as a CVS, or if the patients does not want genetic screening.  Markers on ultrasound detects about 70% of pregnancies with aneuploidy  Abnormalities on NT ultrasound can also increase the risk for a birth defect, like a heart defect    We discussed the following diagnostic options:   Amniocentesis  Invasive diagnostic procedure done after 15 weeks gestation  The procedure collects a small sample of amniotic fluid for the purpose of chromosomal testing and/or other genetic testing  Diagnostic result; more than 99% sensitivity for fetal chromosome abnormalities  Testing for AFP in the amniotic fluid can test for open neural tube defects      It was a pleasure to be involved with Connie schwartz care. Face-to-face time of the meeting was 20 minutes.      Sonia Fuentes MS, [facility]  Certified and Licensed Genetic Counselor  [facility]  Maternal Fetal Medicine  Office: [phone]  Pager: [phone]  [facility]: [phone]   Fax: [phone]  [facility]

## 2023-11-28 ENCOUNTER — PRENATAL OFFICE VISIT (OUTPATIENT)
Dept: OBGYN | Facility: CLINIC | Age: 33
End: 2023-11-28
Payer: COMMERCIAL

## 2023-11-28 VITALS — BODY MASS INDEX: 32.24 KG/M2 | SYSTOLIC BLOOD PRESSURE: 108 MMHG | WEIGHT: 182 LBS | DIASTOLIC BLOOD PRESSURE: 62 MMHG

## 2023-11-28 DIAGNOSIS — Z98.890 HISTORY OF LOOP ELECTROSURGICAL EXCISION PROCEDURE (LEEP) OF CERVIX AFFECTING PREGNANCY IN SECOND TRIMESTER: Primary | ICD-10-CM

## 2023-11-28 DIAGNOSIS — O34.42 HISTORY OF LOOP ELECTROSURGICAL EXCISION PROCEDURE (LEEP) OF CERVIX AFFECTING PREGNANCY IN SECOND TRIMESTER: Primary | ICD-10-CM

## 2023-11-28 DIAGNOSIS — E03.9 HYPOTHYROID IN PREGNANCY, ANTEPARTUM: ICD-10-CM

## 2023-11-28 DIAGNOSIS — O21.9 NAUSEA AND VOMITING DURING PREGNANCY: ICD-10-CM

## 2023-11-28 DIAGNOSIS — O99.280 HYPOTHYROID IN PREGNANCY, ANTEPARTUM: ICD-10-CM

## 2023-11-28 DIAGNOSIS — R82.71 GBS BACTERIURIA: ICD-10-CM

## 2023-11-28 PROCEDURE — 84443 ASSAY THYROID STIM HORMONE: CPT | Performed by: OBSTETRICS & GYNECOLOGY

## 2023-11-28 PROCEDURE — 36415 COLL VENOUS BLD VENIPUNCTURE: CPT | Performed by: OBSTETRICS & GYNECOLOGY

## 2023-11-28 PROCEDURE — 99207 PR COMPLICATED OB VISIT: CPT | Performed by: OBSTETRICS & GYNECOLOGY

## 2023-11-28 NOTE — NURSING NOTE
"Chief Complaint   Patient presents with    Prenatal Care     13 weeks 5 days- no concerns        Initial /62 (BP Location: Right arm, Patient Position: Sitting, Cuff Size: Adult Regular)   Wt 82.6 kg (182 lb)   LMP 2023   BMI 32.24 kg/m   Estimated body mass index is 32.24 kg/m  as calculated from the following:    Height as of 23: 1.6 m (5' 3\").    Weight as of this encounter: 82.6 kg (182 lb).  BP completed using cuff size: large    Questioned patient about current smoking habits.  Pt. has never smoked.          The following HM Due: NONE    13w5d  Evan Cotton CMA                "

## 2023-11-28 NOTE — PROGRESS NOTES
No c/o's other than some N//V.  On Zofran with good results.  Hasn't required it for 1 week.  Is on Synthroid due to high normal TSH before IUI.  Will check TSH today to see if she needs to continue Synthroid.  Has Lvl 2 U/S at 19 weeks.  RTC 3 weeks.  MsAFP at next visit.    Encounter Diagnoses   Name Primary?    History of loop electrosurgical excision procedure (LEEP) of cervix affecting pregnancy in second trimester Yes    Nausea and vomiting during pregnancy     GBS bacteriuria     Hypothyroid in pregnancy, antepartum        Risk factors listed above are stable and being addressed as noted.    Domenic Leonard MD  [facility]

## 2023-11-29 LAB — TSH SERPL DL<=0.005 MIU/L-ACNC: 1.09 UIU/ML (ref 0.3–4.2)

## 2023-12-20 ENCOUNTER — PRENATAL OFFICE VISIT (OUTPATIENT)
Dept: OBGYN | Facility: CLINIC | Age: 33
End: 2023-12-20
Payer: COMMERCIAL

## 2023-12-20 VITALS — DIASTOLIC BLOOD PRESSURE: 72 MMHG | SYSTOLIC BLOOD PRESSURE: 108 MMHG | WEIGHT: 181 LBS | BODY MASS INDEX: 32.06 KG/M2

## 2023-12-20 DIAGNOSIS — R82.71 GBS BACTERIURIA: ICD-10-CM

## 2023-12-20 DIAGNOSIS — O34.42 HISTORY OF LOOP ELECTROSURGICAL EXCISION PROCEDURE (LEEP) OF CERVIX AFFECTING PREGNANCY IN SECOND TRIMESTER: Primary | ICD-10-CM

## 2023-12-20 DIAGNOSIS — O21.9 NAUSEA AND VOMITING DURING PREGNANCY: ICD-10-CM

## 2023-12-20 DIAGNOSIS — Z98.890 HISTORY OF LOOP ELECTROSURGICAL EXCISION PROCEDURE (LEEP) OF CERVIX AFFECTING PREGNANCY IN SECOND TRIMESTER: Primary | ICD-10-CM

## 2023-12-20 PROCEDURE — 99000 SPECIMEN HANDLING OFFICE-LAB: CPT | Performed by: OBSTETRICS & GYNECOLOGY

## 2023-12-20 PROCEDURE — 82105 ALPHA-FETOPROTEIN SERUM: CPT | Mod: 90 | Performed by: OBSTETRICS & GYNECOLOGY

## 2023-12-20 PROCEDURE — 99207 PR COMPLICATED OB VISIT: CPT | Performed by: OBSTETRICS & GYNECOLOGY

## 2023-12-20 PROCEDURE — 36415 COLL VENOUS BLD VENIPUNCTURE: CPT | Performed by: OBSTETRICS & GYNECOLOGY

## 2023-12-20 NOTE — PROGRESS NOTES
Pt still has some increased N/V in AM, but is able to tolerate po.  Had been missing some of her Synthroid and Zoloft doses due to this.  I advised to take her Zofran to help with this so she can take her Rx's, because she had not been taking the Zofran.  MsAFP today.  [facility] U/S 1/15/2023 for anatomy and Cx length.  Also had a loose green stool last night, no F/C.  Will monitor for now and let us know if it worsens.  RTC 4 weeks.    Encounter Diagnoses   Name Primary?    History of loop electrosurgical excision procedure (LEEP) of cervix affecting pregnancy in second trimester Yes    GBS bacteriuria     Nausea and vomiting during pregnancy        Risk factors listed above are stable and being addressed as noted.    Domenic Leonard MD  [facility]

## 2023-12-20 NOTE — NURSING NOTE
"Chief Complaint   Patient presents with    Prenatal Care     16 weeks 6 days- TSH concerns        Initial /72 (BP Location: Right arm, Patient Position: Sitting, Cuff Size: Adult Regular)   Wt 82.1 kg (181 lb)   LMP 2023   BMI 32.06 kg/m   Estimated body mass index is 32.06 kg/m  as calculated from the following:    Height as of 23: 1.6 m (5' 3\").    Weight as of this encounter: 82.1 kg (181 lb).  BP completed using cuff size: regular    Questioned patient about current smoking habits.  Pt. has never smoked.          The following HM Due: NONE    16w6d  Evan Cotton CMA                "

## 2023-12-22 LAB
# FETUSES US: NORMAL
AFP MOM SERPL: 1.12
AFP SERPL-MCNC: 38 NG/ML
AGE - REPORTED: 34.6 YR
CURRENT SMOKER: NO
FAMILY MEMBER DISEASES HX: NO
GA METHOD: NORMAL
GA: NORMAL WK
IDDM PATIENT QL: NO
INTEGRATED SCN PATIENT-IMP: NORMAL
SPECIMEN DRAWN SERPL: NORMAL

## 2024-01-10 ENCOUNTER — OFFICE VISIT (OUTPATIENT)
Dept: OPTOMETRY | Facility: CLINIC | Age: 34
End: 2024-01-10
Payer: COMMERCIAL

## 2024-01-10 DIAGNOSIS — H52.221 REGULAR ASTIGMATISM OF RIGHT EYE: Primary | ICD-10-CM

## 2024-01-10 DIAGNOSIS — H40.039 ANATOMICAL NARROW ANGLE: ICD-10-CM

## 2024-01-10 DIAGNOSIS — H52.12 MYOPIA OF LEFT EYE: ICD-10-CM

## 2024-01-10 PROCEDURE — 92015 DETERMINE REFRACTIVE STATE: CPT | Performed by: OPTOMETRIST

## 2024-01-10 PROCEDURE — 92014 COMPRE OPH EXAM EST PT 1/>: CPT | Performed by: OPTOMETRIST

## 2024-01-10 ASSESSMENT — VISUAL ACUITY
OS_SC: 20/30
OD_SC: 20/25
METHOD: SNELLEN - LINEAR
OD_SC: 20/30
OS_SC: 20/25

## 2024-01-10 ASSESSMENT — EXTERNAL EXAM - LEFT EYE: OS_EXAM: NORMAL

## 2024-01-10 ASSESSMENT — CONF VISUAL FIELD
OS_INFERIOR_NASAL_RESTRICTION: 0
OD_INFERIOR_TEMPORAL_RESTRICTION: 0
OS_NORMAL: 1
OS_INFERIOR_TEMPORAL_RESTRICTION: 0
OS_SUPERIOR_NASAL_RESTRICTION: 0
OD_SUPERIOR_TEMPORAL_RESTRICTION: 0
OD_INFERIOR_NASAL_RESTRICTION: 0
OD_NORMAL: 1
OS_SUPERIOR_TEMPORAL_RESTRICTION: 0
OD_SUPERIOR_NASAL_RESTRICTION: 0

## 2024-01-10 ASSESSMENT — REFRACTION_MANIFEST
OD_SPHERE: -0.50
OD_SPHERE: +0.25
OS_SPHERE: -0.50
OS_AXIS: 164
OS_SPHERE: PLANO
METHOD_AUTOREFRACTION: 1
OD_CYLINDER: +0.75
OS_CYLINDER: +0.75
OD_AXIS: 036
OD_CYLINDER: +0.50
OD_AXIS: 030
OS_CYLINDER: SPHERE

## 2024-01-10 ASSESSMENT — SLIT LAMP EXAM - LIDS
COMMENTS: NORMAL
COMMENTS: NORMAL

## 2024-01-10 ASSESSMENT — TONOMETRY
OD_IOP_MMHG: 15
OS_IOP_MMHG: 16
IOP_METHOD: APPLANATION

## 2024-01-10 ASSESSMENT — CUP TO DISC RATIO
OD_RATIO: 0.2
OS_RATIO: 0.2

## 2024-01-10 ASSESSMENT — EXTERNAL EXAM - RIGHT EYE: OD_EXAM: NORMAL

## 2024-01-10 NOTE — LETTER
1/10/2024         RE: Connie Bhakta  [address]   [facility] [address]        Dear Colleague,    Thank you for referring your patient, Connie Bhakta, to the [facility]. Please see a copy of my visit note below.    Chief Complaint   Patient presents with     Annual Eye Exam        Last Eye Exam: 2022  Dilated Previously: Yes, side effects of dilation explained today    What are you currently using to see?  Glasses - rarely wears glasses mostly for night driving - did not bring them in        Distance Vision Acuity: Satisfied with vision    Near Vision Acuity: Satisfied with vision while reading and using computer with glasses    Eye Comfort: good  Do you use eye drops? : No      Araceli Ragsdaleor - Optometric Assistant       Artificial tears  rarely    Medical, surgical and family histories reviewed and updated 1/10/2024.     Laser PI each eye   20 weeks pregnant w/ first baby!    OBJECTIVE: See Ophthalmology exam    ASSESSMENT:    ICD-10-CM    1. Regular astigmatism of right eye  H52.221       2. Myopia of left eye  H52.12       3. Anatomical narrow angle  H40.039         W/ Patent PI each eye  Glasses are ,minimally different right eye    PLAN:   No change needed   Jeanette Cotton OD     Again, thank you for allowing me to participate in the care of your patient.        Sincerely,        Jeanette Cotton, OD

## 2024-01-10 NOTE — PROGRESS NOTES
Chief Complaint   Patient presents with    Annual Eye Exam        Last Eye Exam: 2022  Dilated Previously: Yes, side effects of dilation explained today    What are you currently using to see?  Glasses - rarely wears glasses mostly for night driving - did not bring them in        Distance Vision Acuity: Satisfied with vision    Near Vision Acuity: Satisfied with vision while reading and using computer with glasses    Eye Comfort: good  Do you use eye drops? : No      Araceli Boothe - Optometric Assistant       Artificial tears  rarely    Medical, surgical and family histories reviewed and updated 1/10/2024.     Laser PI each eye   20 weeks pregnant w/ first baby!    OBJECTIVE: See Ophthalmology exam    ASSESSMENT:    ICD-10-CM    1. Regular astigmatism of right eye  H52.221       2. Myopia of left eye  H52.12       3. Anatomical narrow angle  H40.039         W/ Patent PI each eye  Glasses are ,minimally different right eye    PLAN:   No change needed   Jeanette Cotton OD

## 2024-01-15 ENCOUNTER — OFFICE VISIT (OUTPATIENT)
Dept: MATERNAL FETAL MEDICINE | Facility: CLINIC | Age: 34
End: 2024-01-15
Attending: OBSTETRICS & GYNECOLOGY
Payer: COMMERCIAL

## 2024-01-15 ENCOUNTER — HOSPITAL ENCOUNTER (OUTPATIENT)
Dept: ULTRASOUND IMAGING | Facility: CLINIC | Age: 34
Discharge: HOME | End: 2024-01-15
Attending: OBSTETRICS & GYNECOLOGY
Payer: COMMERCIAL

## 2024-01-15 DIAGNOSIS — O34.41 HISTORY OF LOOP ELECTROSURGICAL EXCISION PROCEDURE (LEEP) OF CERVIX AFFECTING PREGNANCY IN FIRST TRIMESTER: ICD-10-CM

## 2024-01-15 DIAGNOSIS — Z98.890 HISTORY OF LOOP ELECTROSURGICAL EXCISION PROCEDURE (LEEP) OF CERVIX AFFECTING PREGNANCY IN FIRST TRIMESTER: ICD-10-CM

## 2024-01-15 DIAGNOSIS — O35.DXX0 PREGNANCY COMPLICATED BY FETAL GASTROINTESTINAL ABNORMALITY, SINGLE OR UNSPECIFIED FETUS: ICD-10-CM

## 2024-01-15 DIAGNOSIS — O09.899 SINGLE UMBILICAL ARTERY AFFECTING MANAGEMENT OF MOTHER IN SINGLETON PREGNANCY, ANTEPARTUM: Primary | ICD-10-CM

## 2024-01-15 PROCEDURE — 76817 TRANSVAGINAL US OBSTETRIC: CPT

## 2024-01-15 PROCEDURE — 76811 OB US DETAILED SNGL FETUS: CPT

## 2024-01-15 PROCEDURE — 99214 OFFICE O/P EST MOD 30 MIN: CPT | Mod: 25 | Performed by: OBSTETRICS & GYNECOLOGY

## 2024-01-15 PROCEDURE — 76817 TRANSVAGINAL US OBSTETRIC: CPT | Mod: 26 | Performed by: OBSTETRICS & GYNECOLOGY

## 2024-01-15 PROCEDURE — 76811 OB US DETAILED SNGL FETUS: CPT | Mod: 26 | Performed by: OBSTETRICS & GYNECOLOGY

## 2024-01-16 ENCOUNTER — TELEPHONE (OUTPATIENT)
Dept: MATERNAL FETAL MEDICINE | Facility: CLINIC | Age: 34
End: 2024-01-16
Payer: COMMERCIAL

## 2024-01-16 DIAGNOSIS — R82.71 GBS BACTERIURIA: Primary | ICD-10-CM

## 2024-01-16 DIAGNOSIS — O21.9 NAUSEA AND VOMITING DURING PREGNANCY: ICD-10-CM

## 2024-01-16 DIAGNOSIS — O34.42 HISTORY OF LOOP ELECTROSURGICAL EXCISION PROCEDURE (LEEP) OF CERVIX AFFECTING PREGNANCY IN SECOND TRIMESTER: ICD-10-CM

## 2024-01-16 DIAGNOSIS — Z98.890 HISTORY OF LOOP ELECTROSURGICAL EXCISION PROCEDURE (LEEP) OF CERVIX AFFECTING PREGNANCY IN SECOND TRIMESTER: ICD-10-CM

## 2024-01-16 NOTE — TELEPHONE ENCOUNTER
"January 16, 2024    I saw Connie on 11/27/2023 prior to her nuchal translucency ultrasound. At that time, she reported that she and the sperm donor had carrier screening, but that she would email me copies of the reports. 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 The sperm donor was NOT a carrier for this condition.    Connie also stated that she had her comprehensive level II ultrasound yesterday, which identified single umbilical artery (SUA). She stated that she learned that SUA can be associated with an increased risk for chromosome differences, and asked \"do you have any insight on the chances chromosome abnormalities are likely to occur in single umbilical artery babies?\"     I called Connie to discuss this further with her. I left a voicemail asking her to return my call.     Sonia Fuentes MS, [facility]  Certified and Licensed Genetic Counselor  [facility]  Maternal Fetal Medicine  Office: [phone]  Pager: [phone]  [facility]: [phone]   Fax: [phone]  [facility]    "

## 2024-01-16 NOTE — TELEPHONE ENCOUNTER
"January 16, 2024    Connie called me back and we discussed her ultrasound from yesterday. The report states the following:    \"The fetal stomach appeared large on ultrasound today. The amniotic fluid appeared normal. We discussed that this could be a normal transient variant or could be  associated with a gastric outlet/proximal small bowel obstruction. We discussed that it is reassuring that the amniotic fluid currently appears normal and that serial  evaluation is indicated. We then discussed that a single umbilical artery was noted as well. It can be seen in conjunction with other structural anomalies, most commonly  renal and cardiac, which confer a higher risk of aneuploidy. The fetal heart and kidneys were normal in appearance on today's ultrasound. We will plan to coordinate a fetal  echo with Pediatric Cardiology in 3-4 weeks to further evaluate fetal cardiac anatomy. A repeat US will also be scheduled here at that time to re-evaluate fetal stomach  and amniotic fluid. Additional follow up may be indicated based on size of fetal stomach and amniotic fluid. As single umbilical artery has been associated with FGR, also  recommend repeat evaluation of fetal growth at 32 weeks in addition to weekly BPP at 36 weeks.\"    I reviewed these findings with Connie. We discussed that, at this point, ultrasound was not able to completely describe the underlying reason why the stomach appeared large. As Dr. Reyna commented, this could be due to an anatomical difference in the stomach/small bowel, or could be normal variation that resolves. We discussed that, in the case of anatomical differences in the small bowel, such as duodenal atresia, typically polyhydramnios is noted and significant: amniotic fluid levels were normal yesterday, which is reassuring. We also discussed that these types of anatomical differences, if present, can be associated with an increased risk for aneuploidy, specifically trisomy 21 or Down " syndrome. I assured Connie that, at this point, we are not saying that there is a significantly increased risk for aneuploidy.    We also reviewed what Dr. Reyna share about single umbilical artery.We discussed that a normal umbilical cord is made up of 3 vessels - 2 arteries and 1 vein. In a 2 vessel umbilical cord there is atrophy or agenesis of one of the arteries. We see this in about 1% of all pregnancies. It can be seen in conjunction with other structural anomalies, most commonly renal and cardiac, which confer a higher risk of aneuploidy. The fetal heart and kidneys were normal in appearance on yesterday's ultrasound, but to confirm, a fetal echocardiogram is recommended. Additional surveillance is also recommended, as stated above, given increased risks for growth restriction.     Overall, it appears that the risk for chromosome differences in the ongoing pregnancy is not significantly increased based on these ultrasound findings. I informed Connie that, again, if anything were to change on subsequent ultrasounds (e.g., abnormal fetal echo, development of polyhydramnios or clear double bubble/duodenal atresia), then we would certainly updated her accordingly. I also reviewed that Connie had a negative/normal non-invasive prenatal screening. For the common trisomies, this reduces the risk to less than 1 in 10,000.     Finally, I reviewed the option for amniocentesis, which is an invasive procedure that provides diagnostic information about an ongoing pregnancy. If amniocentesis is pursued, chromosome analysis can definitively diagnose or exclude aneuploidy.     I encouraged Connie to reach out to me with any questions that arise. She is scheduled for a follow-up ultrasound on 02/16/2024.         Sonia Fuentes MS, [facility]  Certified and Licensed Genetic Counselor  [facility]  Maternal Fetal Medicine  Office: [phone]  Pager: [phone]  [facility]: [phone]   Fax: [phone]  [facility]  [facility]

## 2024-01-19 ENCOUNTER — PRENATAL OFFICE VISIT (OUTPATIENT)
Dept: OBGYN | Facility: CLINIC | Age: 34
End: 2024-01-19
Payer: COMMERCIAL

## 2024-01-19 VITALS — WEIGHT: 189 LBS | SYSTOLIC BLOOD PRESSURE: 108 MMHG | DIASTOLIC BLOOD PRESSURE: 72 MMHG | BODY MASS INDEX: 33.48 KG/M2

## 2024-01-19 DIAGNOSIS — O99.280 HYPOTHYROID IN PREGNANCY, ANTEPARTUM: Primary | ICD-10-CM

## 2024-01-19 DIAGNOSIS — O21.9 NAUSEA AND VOMITING DURING PREGNANCY: ICD-10-CM

## 2024-01-19 DIAGNOSIS — Z98.890 HISTORY OF LOOP ELECTROSURGICAL EXCISION PROCEDURE (LEEP) OF CERVIX AFFECTING PREGNANCY IN SECOND TRIMESTER: ICD-10-CM

## 2024-01-19 DIAGNOSIS — R82.71 GBS BACTERIURIA: ICD-10-CM

## 2024-01-19 DIAGNOSIS — E03.9 HYPOTHYROID IN PREGNANCY, ANTEPARTUM: Primary | ICD-10-CM

## 2024-01-19 DIAGNOSIS — O34.42 HISTORY OF LOOP ELECTROSURGICAL EXCISION PROCEDURE (LEEP) OF CERVIX AFFECTING PREGNANCY IN SECOND TRIMESTER: ICD-10-CM

## 2024-01-19 PROCEDURE — 36415 COLL VENOUS BLD VENIPUNCTURE: CPT | Performed by: OBSTETRICS & GYNECOLOGY

## 2024-01-19 PROCEDURE — 99207 PR COMPLICATED OB VISIT: CPT | Performed by: OBSTETRICS & GYNECOLOGY

## 2024-01-19 PROCEDURE — 84443 ASSAY THYROID STIM HORMONE: CPT | Performed by: OBSTETRICS & GYNECOLOGY

## 2024-01-19 NOTE — PROGRESS NOTES
No c/o's.  TSH today as she is on Synthroid for high-normal TSH during [facility] Rx.  Not taking Zoloft currently. RTC 4 weeks.    Encounter Diagnoses   Name Primary?    Hypothyroid in pregnancy, antepartum Yes    GBS bacteriuria     History of loop electrosurgical excision procedure (LEEP) of cervix affecting pregnancy in second trimester     Nausea and vomiting during pregnancy        Risk factors listed above are stable and being addressed as noted.    Domenic Leonard MD  [facility] [place]

## 2024-01-19 NOTE — NURSING NOTE
"Chief Complaint   Patient presents with    Prenatal Care     21 weeks 1 day- no concerns        Initial /72 (BP Location: Right arm, Patient Position: Sitting, Cuff Size: Adult Regular)   Wt 85.7 kg (189 lb)   LMP 2023   BMI 33.48 kg/m   Estimated body mass index is 33.48 kg/m  as calculated from the following:    Height as of 23: 1.6 m (5' 3\").    Weight as of this encounter: 85.7 kg (189 lb).  BP completed using cuff size: regular    Questioned patient about current smoking habits.  Pt. has never smoked.          The following HM Due: NONE    21w1d  Evan Cotton CMA                "

## 2024-01-20 LAB — TSH SERPL DL<=0.005 MIU/L-ACNC: 1.44 UIU/ML (ref 0.3–4.2)

## 2024-02-16 ENCOUNTER — OFFICE VISIT (OUTPATIENT)
Dept: MATERNAL FETAL MEDICINE | Facility: CLINIC | Age: 34
End: 2024-02-16
Attending: STUDENT IN AN ORGANIZED HEALTH CARE EDUCATION/TRAINING PROGRAM
Payer: COMMERCIAL

## 2024-02-16 ENCOUNTER — HOSPITAL ENCOUNTER (OUTPATIENT)
Dept: CARDIOLOGY | Facility: CLINIC | Age: 34
Discharge: HOME | End: 2024-02-16
Attending: OBSTETRICS & GYNECOLOGY
Payer: COMMERCIAL

## 2024-02-16 ENCOUNTER — HOSPITAL ENCOUNTER (OUTPATIENT)
Dept: ULTRASOUND IMAGING | Facility: CLINIC | Age: 34
Discharge: HOME | End: 2024-02-16
Attending: STUDENT IN AN ORGANIZED HEALTH CARE EDUCATION/TRAINING PROGRAM
Payer: COMMERCIAL

## 2024-02-16 DIAGNOSIS — O35.DXX0 PREGNANCY COMPLICATED BY FETAL GASTROINTESTINAL ABNORMALITY, SINGLE OR UNSPECIFIED FETUS: ICD-10-CM

## 2024-02-16 DIAGNOSIS — O35.DXX0 PREGNANCY COMPLICATED BY FETAL GASTROINTESTINAL ABNORMALITY, SINGLE OR UNSPECIFIED FETUS: Primary | ICD-10-CM

## 2024-02-16 DIAGNOSIS — O09.899 SINGLE UMBILICAL ARTERY AFFECTING MANAGEMENT OF MOTHER IN SINGLETON PREGNANCY, ANTEPARTUM: ICD-10-CM

## 2024-02-16 PROCEDURE — 76827 ECHO EXAM OF FETAL HEART: CPT | Mod: 26 | Performed by: PEDIATRICS

## 2024-02-16 PROCEDURE — 93325 DOPPLER ECHO COLOR FLOW MAPG: CPT

## 2024-02-16 PROCEDURE — 93325 DOPPLER ECHO COLOR FLOW MAPG: CPT | Mod: 26 | Performed by: PEDIATRICS

## 2024-02-16 PROCEDURE — 76816 OB US FOLLOW-UP PER FETUS: CPT

## 2024-02-16 PROCEDURE — 76827 ECHO EXAM OF FETAL HEART: CPT

## 2024-02-16 PROCEDURE — 76816 OB US FOLLOW-UP PER FETUS: CPT | Mod: 26 | Performed by: STUDENT IN AN ORGANIZED HEALTH CARE EDUCATION/TRAINING PROGRAM

## 2024-02-16 PROCEDURE — 76825 ECHO EXAM OF FETAL HEART: CPT | Mod: 26 | Performed by: PEDIATRICS

## 2024-02-16 PROCEDURE — 99213 OFFICE O/P EST LOW 20 MIN: CPT | Mod: 25 | Performed by: STUDENT IN AN ORGANIZED HEALTH CARE EDUCATION/TRAINING PROGRAM

## 2024-02-16 NOTE — PROGRESS NOTES
"Please see \"Imaging\" tab under \"Chart Review\" for details of today's visit.    Nohemy Cummins    "

## 2024-02-17 PROBLEM — O09.899 TWO VESSEL UMBILICAL CORD, ANTEPARTUM: Status: ACTIVE | Noted: 2024-02-17

## 2024-02-21 ENCOUNTER — PRENATAL OFFICE VISIT (OUTPATIENT)
Dept: OBGYN | Facility: CLINIC | Age: 34
End: 2024-02-21
Payer: COMMERCIAL

## 2024-02-21 VITALS — BODY MASS INDEX: 35.25 KG/M2 | SYSTOLIC BLOOD PRESSURE: 116 MMHG | DIASTOLIC BLOOD PRESSURE: 62 MMHG | WEIGHT: 199 LBS

## 2024-02-21 DIAGNOSIS — O21.9 NAUSEA AND VOMITING DURING PREGNANCY: ICD-10-CM

## 2024-02-21 DIAGNOSIS — R82.71 GBS BACTERIURIA: ICD-10-CM

## 2024-02-21 DIAGNOSIS — O09.899 TWO VESSEL UMBILICAL CORD, ANTEPARTUM: ICD-10-CM

## 2024-02-21 DIAGNOSIS — O34.42 HISTORY OF LOOP ELECTROSURGICAL EXCISION PROCEDURE (LEEP) OF CERVIX AFFECTING PREGNANCY IN SECOND TRIMESTER: Primary | ICD-10-CM

## 2024-02-21 DIAGNOSIS — Z98.890 HISTORY OF LOOP ELECTROSURGICAL EXCISION PROCEDURE (LEEP) OF CERVIX AFFECTING PREGNANCY IN SECOND TRIMESTER: Primary | ICD-10-CM

## 2024-02-21 PROCEDURE — 99207 PR COMPLICATED OB VISIT: CPT | Performed by: OBSTETRICS & GYNECOLOGY

## 2024-02-21 NOTE — PROGRESS NOTES
No c/o's. 28 week labs, TDaP at next visit.  labor/Premature rupture of membranes precautions reviewed. Has MFM U/S in 3 weeks. Had normal TSH last visit.  RTC 3 weeks.    Encounter Diagnoses   Name Primary?    History of loop electrosurgical excision procedure (LEEP) of cervix affecting pregnancy in second trimester Yes    Two vessel umbilical cord, antepartum     GBS bacteriuria     Nausea and vomiting during pregnancy        Risk factors listed above are stable and being addressed as noted.    Domenic Leonard MD  [facility]

## 2024-02-21 NOTE — NURSING NOTE
"Chief Complaint   Patient presents with    Prenatal Care     25w 6d no concerns       Initial /62   Wt 90.3 kg (199 lb)   LMP 2023   BMI 35.25 kg/m   Estimated body mass index is 35.25 kg/m  as calculated from the following:    Height as of 23: 1.6 m (5' 3\").    Weight as of this encounter: 90.3 kg (199 lb).  BP completed using cuff size: regular    Questioned patient about current smoking habits.  Pt. has never smoked.          The following HM Due: NONE        "

## 2024-03-05 ENCOUNTER — TELEPHONE (OUTPATIENT)
Dept: OBGYN | Facility: CLINIC | Age: 34
End: 2024-03-05
Payer: COMMERCIAL

## 2024-03-05 ENCOUNTER — TELEPHONE (OUTPATIENT)
Dept: OPHTHALMOLOGY | Facility: CLINIC | Age: 34
End: 2024-03-05
Payer: COMMERCIAL

## 2024-03-05 NOTE — TELEPHONE ENCOUNTER
27w5d      Pt calls with some floaters in her vision. They were bright spots.  She does have narrow angle glaucoma, these spots just appeared different than her typical floaters.    No swelling in hands or face, no HA.    Added for appt just to get BP checked and chat with MD, pt agrees.  Advised to also message ophthalmologist to inquire about sx, she will do that as well.      Gretel BARONE RN BSN

## 2024-03-05 NOTE — CONFIDENTIAL NOTE
"Spoke with patient. Patient complains of Intermittent \"fireflies\" in both eyes. Lasting for about 30 seconds. Happened a few times. Started 1 week ago. Patient is being seen tomorrow to have blood pressure checked. I told patient that I can schedule her to come in to have Dr. Carvajal take a look but that I think it would be okay to monitor at home call back if symptoms worsen.   "

## 2024-03-05 NOTE — TELEPHONE ENCOUNTER
"[facility] Call Center    Phone Message    May a detailed message be left on voicemail: yes     Reason for Call: Symptoms or Concerns     If patient has red-flag symptoms, warm transfer to triage line    Current symptom or concern: Pt is 27 weeks pregnant with a history of narrow angle glaucoma, for which she had a procedure for a couple years ago. Since last week, off and on, she has been seeing what she describes as \"fireflies\" in both eyes.    Symptoms have been present for:  1 week(s)    Has patient previously been seen for this? No    By : Dr. Carvajal    Date: 8/23/22    Are there any new or worsening symptoms? Yes: \"Fireflies\" in vision    Action Taken: Message routed to:  Other: [facility]    Travel Screening: Not Applicable                                                                    "

## 2024-03-06 ENCOUNTER — PRENATAL OFFICE VISIT (OUTPATIENT)
Dept: OBGYN | Facility: CLINIC | Age: 34
End: 2024-03-06
Payer: COMMERCIAL

## 2024-03-06 VITALS — BODY MASS INDEX: 35.78 KG/M2 | SYSTOLIC BLOOD PRESSURE: 116 MMHG | WEIGHT: 202 LBS | DIASTOLIC BLOOD PRESSURE: 68 MMHG

## 2024-03-06 DIAGNOSIS — H40.039 ANATOMICAL NARROW ANGLE: ICD-10-CM

## 2024-03-06 DIAGNOSIS — H53.9 VISION CHANGES: Primary | ICD-10-CM

## 2024-03-06 LAB
ERYTHROCYTE [DISTWIDTH] IN BLOOD BY AUTOMATED COUNT: 13 % (ref 10–15)
HCT VFR BLD AUTO: 32 % (ref 35–47)
HGB BLD-MCNC: 10.8 G/DL (ref 11.7–15.7)
MCH RBC QN AUTO: 30 PG (ref 26.5–33)
MCHC RBC AUTO-ENTMCNC: 33.8 G/DL (ref 31.5–36.5)
MCV RBC AUTO: 89 FL (ref 78–100)
PLATELET # BLD AUTO: 314 10E3/UL (ref 150–450)
RBC # BLD AUTO: 3.6 10E6/UL (ref 3.8–5.2)
WBC # BLD AUTO: 13.6 10E3/UL (ref 4–11)

## 2024-03-06 PROCEDURE — 84460 ALANINE AMINO (ALT) (SGPT): CPT | Performed by: STUDENT IN AN ORGANIZED HEALTH CARE EDUCATION/TRAINING PROGRAM

## 2024-03-06 PROCEDURE — 82565 ASSAY OF CREATININE: CPT | Performed by: STUDENT IN AN ORGANIZED HEALTH CARE EDUCATION/TRAINING PROGRAM

## 2024-03-06 PROCEDURE — 85027 COMPLETE CBC AUTOMATED: CPT | Performed by: STUDENT IN AN ORGANIZED HEALTH CARE EDUCATION/TRAINING PROGRAM

## 2024-03-06 PROCEDURE — 84450 TRANSFERASE (AST) (SGOT): CPT | Performed by: STUDENT IN AN ORGANIZED HEALTH CARE EDUCATION/TRAINING PROGRAM

## 2024-03-06 PROCEDURE — 99213 OFFICE O/P EST LOW 20 MIN: CPT | Performed by: STUDENT IN AN ORGANIZED HEALTH CARE EDUCATION/TRAINING PROGRAM

## 2024-03-06 PROCEDURE — 36415 COLL VENOUS BLD VENIPUNCTURE: CPT | Performed by: STUDENT IN AN ORGANIZED HEALTH CARE EDUCATION/TRAINING PROGRAM

## 2024-03-06 PROCEDURE — 84156 ASSAY OF PROTEIN URINE: CPT | Performed by: STUDENT IN AN ORGANIZED HEALTH CARE EDUCATION/TRAINING PROGRAM

## 2024-03-06 NOTE — NURSING NOTE
"Chief Complaint   Patient presents with    Prenatal Care     27w6d       Initial /72   Wt 91.6 kg (202 lb)   LMP 2023   BMI 35.78 kg/m   Estimated body mass index is 35.78 kg/m  as calculated from the following:    Height as of 23: 1.6 m (5' 3\").    Weight as of this encounter: 91.6 kg (202 lb).  BP completed using cuff size: regular    Questioned patient about current smoking habits.  Pt. has never smoked.          C/o vision floaters over the last week or 2, has only happened a handful of times, lasts about 30 seconds. None today so far  +FM daily  Esther Morrison CMA on 3/6/2024 at 1:48 PM    "

## 2024-03-06 NOTE — PROGRESS NOTES
[facility]  Return OB Visit    S:  Connie hBakta is a 33 year old  who presents at 27w6d for problem visit.    - She has a history of narrow angle glaucoma for which she had procedure a few years ago.   - She called the careline 3/5 due to 4 instances in the last week of seeing fireflies/sparklers in her eyes.  - Each episode lasted about 30 seconds. Occurred when she was walking around. Never occurred at rest.  - Denies other visual changes.  - Has had mild HA the last few weeks but has not needed tylenol.  - Denies CP, SOB, RUQ pain, increased edema.  - She is planning to see her ophthalmologist in the next few days.    O:  /72   Wt 91.6 kg (202 lb)   LMP 2023   BMI 35.78 kg/m    Body mass index is 35.78 kg/m .    Exam:  GEN: appears well, NAD  CV: RRR, no m/r/g  PULM: NWOB, CTAB  ABD: soft, non-tender, non-distended  NEURO: sensation and strength intact/equal in b/l UE and LE, patellar reflex 2+ b/l, no clonus, CN 2-12 intact  OPTHO: pupils normal in shape and size, reactive to light and accomodation, no afferent pupillary defect. Visual fields intact. Full ROM of extraocular movents without pain    FH 27 cm  FHT: 150    A/P:  Connie Bhakta is a 33 year old  who presents at 27w6d for problem visit with complaints of intermittent fireflies/sparklers seen in her eyes. She has a history of narrow angle glaucoma.    - Changes are short-lived without other vision changes or associated symptoms  - Ophthalmic exam wnl  - No symptoms of PreE and BP wnl  - Plan HELLP labs and UPC  - Recommend expedited f/up with her ophthalmologist  - Reviewed warning sings and when to call or come to triage    Salazar Carpio MD MPH   [facility]

## 2024-03-07 LAB
ALBUMIN MFR UR ELPH: <6 MG/DL
ALT SERPL W P-5'-P-CCNC: 11 U/L (ref 0–50)
AST SERPL W P-5'-P-CCNC: 12 U/L (ref 0–45)
CREAT SERPL-MCNC: 0.61 MG/DL (ref 0.51–0.95)
CREAT UR-MCNC: 18.9 MG/DL
EGFRCR SERPLBLD CKD-EPI 2021: >90 ML/MIN/1.73M2
PROT/CREAT 24H UR: NORMAL MG/G{CREAT}

## 2024-03-15 ENCOUNTER — OFFICE VISIT (OUTPATIENT)
Dept: MATERNAL FETAL MEDICINE | Facility: CLINIC | Age: 34
End: 2024-03-15
Attending: STUDENT IN AN ORGANIZED HEALTH CARE EDUCATION/TRAINING PROGRAM
Payer: COMMERCIAL

## 2024-03-15 ENCOUNTER — HOSPITAL ENCOUNTER (OUTPATIENT)
Dept: ULTRASOUND IMAGING | Facility: CLINIC | Age: 34
Discharge: HOME | End: 2024-03-15
Attending: STUDENT IN AN ORGANIZED HEALTH CARE EDUCATION/TRAINING PROGRAM
Payer: COMMERCIAL

## 2024-03-15 DIAGNOSIS — O35.DXX0 PREGNANCY COMPLICATED BY FETAL GASTROINTESTINAL ABNORMALITY, SINGLE OR UNSPECIFIED FETUS: Primary | ICD-10-CM

## 2024-03-15 DIAGNOSIS — O09.899 SINGLE UMBILICAL ARTERY AFFECTING MANAGEMENT OF MOTHER IN SINGLETON PREGNANCY, ANTEPARTUM: ICD-10-CM

## 2024-03-15 DIAGNOSIS — O35.DXX0 PREGNANCY COMPLICATED BY FETAL GASTROINTESTINAL ABNORMALITY, SINGLE OR UNSPECIFIED FETUS: ICD-10-CM

## 2024-03-15 DIAGNOSIS — O36.60X0 EXCESSIVE FETAL GROWTH AFFECTING MANAGEMENT OF PREGNANCY, ANTEPARTUM, SINGLE OR UNSPECIFIED FETUS: ICD-10-CM

## 2024-03-15 PROCEDURE — 76816 OB US FOLLOW-UP PER FETUS: CPT

## 2024-03-15 PROCEDURE — 99213 OFFICE O/P EST LOW 20 MIN: CPT | Mod: 25 | Performed by: STUDENT IN AN ORGANIZED HEALTH CARE EDUCATION/TRAINING PROGRAM

## 2024-03-15 PROCEDURE — 76816 OB US FOLLOW-UP PER FETUS: CPT | Mod: 26 | Performed by: STUDENT IN AN ORGANIZED HEALTH CARE EDUCATION/TRAINING PROGRAM

## 2024-03-15 NOTE — NURSING NOTE
Patient presents to [facility] for RL2 at 29w1d due to 2VC, enlarged fetal stomach. Positive fetal movement. Denies LOF, vaginal bleeding or cramping/contractions. SBAR given to [facility] MD, see their note in Epic.

## 2024-03-15 NOTE — PROGRESS NOTES
"Please see \"Imaging\" tab under \"Chart Review\" for details of today's visit.    Nohemy Cummnis    "

## 2024-03-18 PROBLEM — O36.63X0 EXCESSIVE FETAL GROWTH AFFECTING MANAGEMENT OF PREGNANCY IN THIRD TRIMESTER: Status: ACTIVE | Noted: 2024-03-18

## 2024-03-19 ENCOUNTER — PRENATAL OFFICE VISIT (OUTPATIENT)
Dept: OBGYN | Facility: CLINIC | Age: 34
End: 2024-03-19
Payer: COMMERCIAL

## 2024-03-19 VITALS — DIASTOLIC BLOOD PRESSURE: 62 MMHG | SYSTOLIC BLOOD PRESSURE: 110 MMHG | WEIGHT: 202.3 LBS | BODY MASS INDEX: 35.84 KG/M2

## 2024-03-19 DIAGNOSIS — O99.280 HYPOTHYROID IN PREGNANCY, ANTEPARTUM: ICD-10-CM

## 2024-03-19 DIAGNOSIS — O09.899 TWO VESSEL UMBILICAL CORD, ANTEPARTUM: ICD-10-CM

## 2024-03-19 DIAGNOSIS — O36.63X0 EXCESSIVE FETAL GROWTH AFFECTING MANAGEMENT OF PREGNANCY IN THIRD TRIMESTER, SINGLE OR UNSPECIFIED FETUS: Primary | ICD-10-CM

## 2024-03-19 DIAGNOSIS — Z23 NEED FOR TDAP VACCINATION: ICD-10-CM

## 2024-03-19 DIAGNOSIS — E03.9 HYPOTHYROID IN PREGNANCY, ANTEPARTUM: ICD-10-CM

## 2024-03-19 DIAGNOSIS — Z98.890 HISTORY OF LOOP ELECTROSURGICAL EXCISION PROCEDURE (LEEP) OF CERVIX AFFECTING PREGNANCY IN THIRD TRIMESTER: ICD-10-CM

## 2024-03-19 DIAGNOSIS — O21.9 NAUSEA AND VOMITING DURING PREGNANCY: ICD-10-CM

## 2024-03-19 DIAGNOSIS — R82.71 GBS BACTERIURIA: ICD-10-CM

## 2024-03-19 DIAGNOSIS — O34.43 HISTORY OF LOOP ELECTROSURGICAL EXCISION PROCEDURE (LEEP) OF CERVIX AFFECTING PREGNANCY IN THIRD TRIMESTER: ICD-10-CM

## 2024-03-19 LAB
ERYTHROCYTE [DISTWIDTH] IN BLOOD BY AUTOMATED COUNT: 13 % (ref 10–15)
GLUCOSE 1H P 50 G GLC PO SERPL-MCNC: 101 MG/DL (ref 70–129)
HCT VFR BLD AUTO: 32.9 % (ref 35–47)
HGB BLD-MCNC: 10.7 G/DL (ref 11.7–15.7)
MCH RBC QN AUTO: 29.4 PG (ref 26.5–33)
MCHC RBC AUTO-ENTMCNC: 32.5 G/DL (ref 31.5–36.5)
MCV RBC AUTO: 90 FL (ref 78–100)
PLATELET # BLD AUTO: 331 10E3/UL (ref 150–450)
RBC # BLD AUTO: 3.64 10E6/UL (ref 3.8–5.2)
WBC # BLD AUTO: 11.3 10E3/UL (ref 4–11)

## 2024-03-19 PROCEDURE — 85027 COMPLETE CBC AUTOMATED: CPT | Performed by: OBSTETRICS & GYNECOLOGY

## 2024-03-19 PROCEDURE — 36415 COLL VENOUS BLD VENIPUNCTURE: CPT | Performed by: OBSTETRICS & GYNECOLOGY

## 2024-03-19 PROCEDURE — 84443 ASSAY THYROID STIM HORMONE: CPT | Performed by: OBSTETRICS & GYNECOLOGY

## 2024-03-19 PROCEDURE — 99207 PR COMPLICATED OB VISIT: CPT | Performed by: OBSTETRICS & GYNECOLOGY

## 2024-03-19 PROCEDURE — 82950 GLUCOSE TEST: CPT | Performed by: OBSTETRICS & GYNECOLOGY

## 2024-03-19 PROCEDURE — 86780 TREPONEMA PALLIDUM: CPT | Performed by: OBSTETRICS & GYNECOLOGY

## 2024-03-19 PROCEDURE — 90715 TDAP VACCINE 7 YRS/> IM: CPT | Performed by: OBSTETRICS & GYNECOLOGY

## 2024-03-19 PROCEDURE — 90471 IMMUNIZATION ADMIN: CPT | Performed by: OBSTETRICS & GYNECOLOGY

## 2024-03-19 NOTE — NURSING NOTE
"Chief Complaint   Patient presents with    Prenatal Care     29 weeks 5 days. No c/o VB, LoF, cramping/contractions. Feeling FM daily. No questions or concerns today    Blood Draw     Patient is completing 1 hour gtt and labs    Imm/Inj     Tdap         Initial /62   Wt 91.8 kg (202 lb 4.8 oz)   LMP 2023   BMI 35.84 kg/m   Estimated body mass index is 35.84 kg/m  as calculated from the following:    Height as of 23: 1.6 m (5' 3\").    Weight as of this encounter: 91.8 kg (202 lb 4.8 oz).  BP completed using cuff size: regular    Questioned patient about current smoking habits.  Pt. has never smoked.          The following HM Due: NONE    Lasha Galvan CMA             "

## 2024-03-19 NOTE — PROGRESS NOTES
No c/o's. 28 week labs, TSH, TDaP today. MFM U/S 4 days go showed LGA, ?enlarged stomach but normal fluid so doubt outlet obstruction. Has follow-up U/S .  labor/Premature rupture of membranes precautions reviewed.  RTC 2 weeks.    Encounter Diagnoses   Name Primary?    Excessive fetal growth affecting management of pregnancy in third trimester, single or unspecified fetus Yes    History of loop electrosurgical excision procedure (LEEP) of cervix affecting pregnancy in third trimester     Two vessel umbilical cord, antepartum     GBS bacteriuria     Nausea and vomiting during pregnancy     Need for Tdap vaccination        Risk factors listed above are stable and being addressed as noted.    Domenic Leonard MD  [facility]

## 2024-03-20 LAB
T PALLIDUM AB SER QL: NONREACTIVE
TSH SERPL DL<=0.005 MIU/L-ACNC: 1.75 UIU/ML (ref 0.3–4.2)

## 2024-04-03 ENCOUNTER — PRENATAL OFFICE VISIT (OUTPATIENT)
Dept: OBGYN | Facility: CLINIC | Age: 34
End: 2024-04-03
Payer: COMMERCIAL

## 2024-04-03 VITALS — DIASTOLIC BLOOD PRESSURE: 64 MMHG | BODY MASS INDEX: 35.78 KG/M2 | SYSTOLIC BLOOD PRESSURE: 108 MMHG | WEIGHT: 202 LBS

## 2024-04-03 DIAGNOSIS — O34.43 HISTORY OF LOOP ELECTROSURGICAL EXCISION PROCEDURE (LEEP) OF CERVIX AFFECTING PREGNANCY IN THIRD TRIMESTER: ICD-10-CM

## 2024-04-03 DIAGNOSIS — O09.899 TWO VESSEL UMBILICAL CORD, ANTEPARTUM: ICD-10-CM

## 2024-04-03 DIAGNOSIS — O36.63X0 EXCESSIVE FETAL GROWTH AFFECTING MANAGEMENT OF PREGNANCY IN THIRD TRIMESTER, SINGLE OR UNSPECIFIED FETUS: Primary | ICD-10-CM

## 2024-04-03 DIAGNOSIS — O21.9 NAUSEA AND VOMITING DURING PREGNANCY: ICD-10-CM

## 2024-04-03 DIAGNOSIS — R82.71 GBS BACTERIURIA: ICD-10-CM

## 2024-04-03 DIAGNOSIS — Z98.890 HISTORY OF LOOP ELECTROSURGICAL EXCISION PROCEDURE (LEEP) OF CERVIX AFFECTING PREGNANCY IN THIRD TRIMESTER: ICD-10-CM

## 2024-04-03 PROCEDURE — 99207 PR COMPLICATED OB VISIT: CPT | Performed by: OBSTETRICS & GYNECOLOGY

## 2024-04-03 NOTE — PROGRESS NOTES
No c/o's. Has MFM U/S next week. Fetal movement counts BID,  labor/premature rupture of membranes precautions reviewed.  RTC 2 week(s).    Encounter Diagnoses   Name Primary?    Excessive fetal growth affecting management of pregnancy in third trimester, single or unspecified fetus Yes    History of loop electrosurgical excision procedure (LEEP) of cervix affecting pregnancy in third trimester     GBS bacteriuria     Nausea and vomiting during pregnancy     Two vessel umbilical cord, antepartum        Risk factors listed above are stable and being addressed as noted.    Domenic Leonard MD  [facility]

## 2024-04-03 NOTE — NURSING NOTE
"Chief Complaint   Patient presents with    Prenatal Care     31 weeks 6 days- no concerns        Initial /64 (BP Location: Right arm, Patient Position: Sitting, Cuff Size: Adult Regular)   Wt 91.6 kg (202 lb)   LMP 2023   BMI 35.78 kg/m   Estimated body mass index is 35.78 kg/m  as calculated from the following:    Height as of 23: 1.6 m (5' 3\").    Weight as of this encounter: 91.6 kg (202 lb).  BP completed using cuff size: regular    Questioned patient about current smoking habits.  Pt. has never smoked.          The following HM Due: NONE    31w6d  Evan Cotton CMA              "

## 2024-04-12 ENCOUNTER — OFFICE VISIT (OUTPATIENT)
Dept: MATERNAL FETAL MEDICINE | Facility: CLINIC | Age: 34
End: 2024-04-12
Attending: OBSTETRICS & GYNECOLOGY
Payer: COMMERCIAL

## 2024-04-12 ENCOUNTER — HOSPITAL ENCOUNTER (OUTPATIENT)
Dept: ULTRASOUND IMAGING | Facility: CLINIC | Age: 34
Discharge: HOME | End: 2024-04-12
Attending: OBSTETRICS & GYNECOLOGY
Payer: COMMERCIAL

## 2024-04-12 DIAGNOSIS — O09.899 SINGLE UMBILICAL ARTERY AFFECTING MANAGEMENT OF MOTHER IN SINGLETON PREGNANCY, ANTEPARTUM: ICD-10-CM

## 2024-04-12 DIAGNOSIS — O35.DXX0 PREGNANCY COMPLICATED BY FETAL GASTROINTESTINAL ABNORMALITY, SINGLE OR UNSPECIFIED FETUS: ICD-10-CM

## 2024-04-12 DIAGNOSIS — O36.60X0 EXCESSIVE FETAL GROWTH AFFECTING MANAGEMENT OF PREGNANCY, ANTEPARTUM, SINGLE OR UNSPECIFIED FETUS: ICD-10-CM

## 2024-04-12 DIAGNOSIS — O40.3XX0 POLYHYDRAMNIOS AFFECTING PREGNANCY IN THIRD TRIMESTER: Primary | ICD-10-CM

## 2024-04-12 PROCEDURE — 99213 OFFICE O/P EST LOW 20 MIN: CPT | Mod: 25 | Performed by: OBSTETRICS & GYNECOLOGY

## 2024-04-12 PROCEDURE — 76816 OB US FOLLOW-UP PER FETUS: CPT | Mod: 26 | Performed by: OBSTETRICS & GYNECOLOGY

## 2024-04-12 PROCEDURE — 76816 OB US FOLLOW-UP PER FETUS: CPT

## 2024-04-12 NOTE — PROGRESS NOTES
The patient was seen for an ultrasound in the Maternal-Fetal Medicine Center at the [facility] today.  For a detailed report of the ultrasound examination, please see the ultrasound report which can be found under the imaging tab.    If you have questions regarding today's evaluation or if we can be of further service, please contact the Maternal-Fetal Medicine Center.    Dannielle Evangelista MD  , OB/GYN  Maternal-Fetal Medicine  [phone] (Pager)

## 2024-04-14 ENCOUNTER — MYC REFILL (OUTPATIENT)
Dept: PEDIATRICS | Facility: CLINIC | Age: 34
End: 2024-04-14
Payer: COMMERCIAL

## 2024-04-14 DIAGNOSIS — E03.9 HYPOTHYROIDISM AFFECTING PREGNANCY IN THIRD TRIMESTER: Primary | ICD-10-CM

## 2024-04-14 DIAGNOSIS — O99.283 HYPOTHYROIDISM AFFECTING PREGNANCY IN THIRD TRIMESTER: Primary | ICD-10-CM

## 2024-04-15 RX ORDER — LEVOTHYROXINE SODIUM 50 UG/1
TABLET ORAL
OUTPATIENT
Start: 2024-04-15

## 2024-04-15 NOTE — TELEPHONE ENCOUNTER
Called and LVM that refill request was refused as [facility] has never prescribed this for her. Would need a VV to get from [facility] otherwise she should call her specialty provider for refills.  Amy Cotton, CMA

## 2024-04-17 ENCOUNTER — MYC MEDICAL ADVICE (OUTPATIENT)
Dept: PEDIATRICS | Facility: CLINIC | Age: 34
End: 2024-04-17

## 2024-04-17 ENCOUNTER — PRENATAL OFFICE VISIT (OUTPATIENT)
Dept: OBGYN | Facility: CLINIC | Age: 34
End: 2024-04-17
Payer: COMMERCIAL

## 2024-04-17 VITALS — SYSTOLIC BLOOD PRESSURE: 118 MMHG | BODY MASS INDEX: 36.49 KG/M2 | WEIGHT: 206 LBS | DIASTOLIC BLOOD PRESSURE: 70 MMHG

## 2024-04-17 DIAGNOSIS — R82.71 GBS BACTERIURIA: ICD-10-CM

## 2024-04-17 DIAGNOSIS — O21.9 NAUSEA AND VOMITING DURING PREGNANCY: ICD-10-CM

## 2024-04-17 DIAGNOSIS — E03.9 HYPOTHYROIDISM AFFECTING PREGNANCY IN THIRD TRIMESTER: ICD-10-CM

## 2024-04-17 DIAGNOSIS — O36.63X0 EXCESSIVE FETAL GROWTH AFFECTING MANAGEMENT OF PREGNANCY IN THIRD TRIMESTER, SINGLE OR UNSPECIFIED FETUS: Primary | ICD-10-CM

## 2024-04-17 DIAGNOSIS — O09.899 TWO VESSEL UMBILICAL CORD, ANTEPARTUM: ICD-10-CM

## 2024-04-17 DIAGNOSIS — Z98.890 HISTORY OF LOOP ELECTROSURGICAL EXCISION PROCEDURE (LEEP) OF CERVIX AFFECTING PREGNANCY IN THIRD TRIMESTER: ICD-10-CM

## 2024-04-17 DIAGNOSIS — O34.43 HISTORY OF LOOP ELECTROSURGICAL EXCISION PROCEDURE (LEEP) OF CERVIX AFFECTING PREGNANCY IN THIRD TRIMESTER: ICD-10-CM

## 2024-04-17 DIAGNOSIS — O99.283 HYPOTHYROIDISM AFFECTING PREGNANCY IN THIRD TRIMESTER: ICD-10-CM

## 2024-04-17 PROCEDURE — 99207 PR COMPLICATED OB VISIT: CPT | Performed by: OBSTETRICS & GYNECOLOGY

## 2024-04-17 RX ORDER — LEVOTHYROXINE SODIUM 50 UG/1
50 TABLET ORAL DAILY
Qty: 90 TABLET | Refills: 1 | Status: CANCELLED | OUTPATIENT
Start: 2024-04-17

## 2024-04-17 RX ORDER — LEVOTHYROXINE SODIUM 50 UG/1
50 TABLET ORAL DAILY
Qty: 90 TABLET | Refills: 1 | Status: SHIPPED | OUTPATIENT
Start: 2024-04-17 | End: 2024-06-25

## 2024-04-17 NOTE — PROGRESS NOTES
No c/o's. On Synthroid 50 mcg. Has MFM U/S in 2 weeks. Fetal movement counts BID,  labor/premature rupture of membranes precautions reviewed.  RTC 2 week(s).    Encounter Diagnoses   Name Primary?    Excessive fetal growth affecting management of pregnancy in third trimester, single or unspecified fetus Yes    Two vessel umbilical cord, antepartum     History of loop electrosurgical excision procedure (LEEP) of cervix affecting pregnancy in third trimester     GBS bacteriuria     Nausea and vomiting during pregnancy     Hypothyroidism affecting pregnancy in third trimester        Risk factors listed above are stable and being addressed as noted.    Domenic Leonard MD  [facility]

## 2024-04-17 NOTE — NURSING NOTE
"Chief Complaint   Patient presents with    Prenatal Care     33 weeks 6 days- no concerns        Initial /70 (BP Location: Right arm, Patient Position: Sitting, Cuff Size: Adult Regular)   Wt 93.4 kg (206 lb)   LMP 2023   BMI 36.49 kg/m   Estimated body mass index is 36.49 kg/m  as calculated from the following:    Height as of 23: 1.6 m (5' 3\").    Weight as of this encounter: 93.4 kg (206 lb).  BP completed using cuff size: regular    Questioned patient about current smoking habits.  Pt. has never smoked.          The following HM Due: NONE    33w6d  Evan Cotton CMA              "

## 2024-04-29 ENCOUNTER — OFFICE VISIT (OUTPATIENT)
Dept: MATERNAL FETAL MEDICINE | Facility: CLINIC | Age: 34
End: 2024-04-29
Attending: OBSTETRICS & GYNECOLOGY
Payer: COMMERCIAL

## 2024-04-29 ENCOUNTER — HOSPITAL ENCOUNTER (OUTPATIENT)
Dept: ULTRASOUND IMAGING | Facility: CLINIC | Age: 34
Discharge: HOME | End: 2024-04-29
Attending: OBSTETRICS & GYNECOLOGY
Payer: COMMERCIAL

## 2024-04-29 DIAGNOSIS — O40.3XX0 POLYHYDRAMNIOS AFFECTING PREGNANCY IN THIRD TRIMESTER: Primary | ICD-10-CM

## 2024-04-29 DIAGNOSIS — O09.899 SINGLE UMBILICAL ARTERY AFFECTING MANAGEMENT OF MOTHER IN SINGLETON PREGNANCY, ANTEPARTUM: ICD-10-CM

## 2024-04-29 PROCEDURE — 76815 OB US LIMITED FETUS(S): CPT

## 2024-04-29 PROCEDURE — 76815 OB US LIMITED FETUS(S): CPT | Mod: 26 | Performed by: OBSTETRICS & GYNECOLOGY

## 2024-04-29 NOTE — NURSING NOTE
Patient reports good fetal movement, denies contractions, leaking of fluid, or bleeding.   SBAR given to [facility], see their note in Epic.

## 2024-05-02 ENCOUNTER — PRENATAL OFFICE VISIT (OUTPATIENT)
Dept: OBGYN | Facility: CLINIC | Age: 34
End: 2024-05-02
Payer: COMMERCIAL

## 2024-05-02 VITALS — WEIGHT: 207 LBS | DIASTOLIC BLOOD PRESSURE: 74 MMHG | SYSTOLIC BLOOD PRESSURE: 116 MMHG | BODY MASS INDEX: 36.67 KG/M2

## 2024-05-02 DIAGNOSIS — O09.899 TWO VESSEL UMBILICAL CORD, ANTEPARTUM: ICD-10-CM

## 2024-05-02 DIAGNOSIS — R82.71 GBS BACTERIURIA: Primary | ICD-10-CM

## 2024-05-02 DIAGNOSIS — O09.90 HIGH-RISK PREGNANCY, UNSPECIFIED TRIMESTER: ICD-10-CM

## 2024-05-02 PROCEDURE — 99207 PR PRENATAL VISIT: CPT | Performed by: OBSTETRICS & GYNECOLOGY

## 2024-05-02 NOTE — NURSING NOTE
"Chief Complaint   Patient presents with    Prenatal Care     36w0d       Initial /74   Wt 93.9 kg (207 lb)   LMP 2023   BMI 36.67 kg/m   Estimated body mass index is 36.67 kg/m  as calculated from the following:    Height as of 23: 1.6 m (5' 3\").    Weight as of this encounter: 93.9 kg (207 lb).  BP completed using cuff size: large    Questioned patient about current smoking habits.  Pt. has never smoked.        +FM daily  Increased swelling  Insomnia and numbness in arms    Esther Morrison CMA on 2024 at 3:07 PM    "

## 2024-05-02 NOTE — PROGRESS NOTES
Routine obstetric visit at 36w0d  Patient of Dr. Leonard  Chart reviewed.  Group B Strep bacteriuria, no need for swab.  Offered cervix check: FT, soft, 50%, presenting part is high but has US tomorrow.  Signs and symptoms of labor reviewed, including when to call or come in for evaluation.  Follow up weekly.  Had questions about VD versus , discussed.    Abi Medel MD  [facility] Obstetrics and Gynecology

## 2024-05-03 ENCOUNTER — OFFICE VISIT (OUTPATIENT)
Dept: MATERNAL FETAL MEDICINE | Facility: CLINIC | Age: 34
End: 2024-05-03
Attending: OBSTETRICS & GYNECOLOGY
Payer: COMMERCIAL

## 2024-05-03 ENCOUNTER — HOSPITAL ENCOUNTER (OUTPATIENT)
Dept: ULTRASOUND IMAGING | Facility: CLINIC | Age: 34
Discharge: HOME | End: 2024-05-03
Attending: OBSTETRICS & GYNECOLOGY
Payer: COMMERCIAL

## 2024-05-03 DIAGNOSIS — O09.899 SINGLE UMBILICAL ARTERY AFFECTING MANAGEMENT OF MOTHER IN SINGLETON PREGNANCY, ANTEPARTUM: ICD-10-CM

## 2024-05-03 DIAGNOSIS — O09.899 SINGLE UMBILICAL ARTERY AFFECTING MANAGEMENT OF MOTHER IN SINGLETON PREGNANCY, ANTEPARTUM: Primary | ICD-10-CM

## 2024-05-03 PROCEDURE — 76819 FETAL BIOPHYS PROFIL W/O NST: CPT | Mod: 26 | Performed by: STUDENT IN AN ORGANIZED HEALTH CARE EDUCATION/TRAINING PROGRAM

## 2024-05-03 PROCEDURE — 76819 FETAL BIOPHYS PROFIL W/O NST: CPT

## 2024-05-03 NOTE — NURSING NOTE
Patient presents to [facility] for BPP at 36w1d due to 2VC. Positive fetal movement. Denies LOF, vaginal bleeding or cramping/contractions. SBAR given to [facility] ACU[facility], see their note in Epic.

## 2024-05-08 ENCOUNTER — PRENATAL OFFICE VISIT (OUTPATIENT)
Dept: OBGYN | Facility: CLINIC | Age: 34
End: 2024-05-08
Payer: COMMERCIAL

## 2024-05-08 VITALS — WEIGHT: 208 LBS | DIASTOLIC BLOOD PRESSURE: 80 MMHG | BODY MASS INDEX: 36.85 KG/M2 | SYSTOLIC BLOOD PRESSURE: 122 MMHG

## 2024-05-08 DIAGNOSIS — O36.63X0 EXCESSIVE FETAL GROWTH AFFECTING MANAGEMENT OF PREGNANCY IN THIRD TRIMESTER, SINGLE OR UNSPECIFIED FETUS: Primary | ICD-10-CM

## 2024-05-08 DIAGNOSIS — R82.71 GBS BACTERIURIA: ICD-10-CM

## 2024-05-08 DIAGNOSIS — O34.43 HISTORY OF LOOP ELECTROSURGICAL EXCISION PROCEDURE (LEEP) OF CERVIX AFFECTING PREGNANCY IN THIRD TRIMESTER: ICD-10-CM

## 2024-05-08 DIAGNOSIS — O21.9 NAUSEA AND VOMITING DURING PREGNANCY: ICD-10-CM

## 2024-05-08 DIAGNOSIS — Z98.890 HISTORY OF LOOP ELECTROSURGICAL EXCISION PROCEDURE (LEEP) OF CERVIX AFFECTING PREGNANCY IN THIRD TRIMESTER: ICD-10-CM

## 2024-05-08 DIAGNOSIS — O09.899 TWO VESSEL UMBILICAL CORD, ANTEPARTUM: ICD-10-CM

## 2024-05-08 PROCEDURE — 99207 PR COMPLICATED OB VISIT: CPT | Performed by: OBSTETRICS & GYNECOLOGY

## 2024-05-08 NOTE — NURSING NOTE
"Chief Complaint   Patient presents with    Prenatal Care     36 weeks 6 days- no concerns        Initial /80 (BP Location: Right arm, Patient Position: Sitting, Cuff Size: Adult Large)   Wt 94.3 kg (208 lb)   LMP 2023   BMI 36.85 kg/m   Estimated body mass index is 36.85 kg/m  as calculated from the following:    Height as of 23: 1.6 m (5' 3\").    Weight as of this encounter: 94.3 kg (208 lb).  BP completed using cuff size: large    Questioned patient about current smoking habits.  Pt. has never smoked.          The following HM Due: NONE    36w6d  Evan Cotton CMA                "

## 2024-05-08 NOTE — PROGRESS NOTES
No c/o's. Cx-0/70/-4/Vtx. Has MFM U/S in 2 days, and weekly BPPs. Fetal movement counts BID,  labor/premature rupture of membranes precautions reviewed.  RTC 1 week(s).    Encounter Diagnoses   Name Primary?    Excessive fetal growth affecting management of pregnancy in third trimester, single or unspecified fetus Yes    Two vessel umbilical cord, antepartum     History of loop electrosurgical excision procedure (LEEP) of cervix affecting pregnancy in third trimester     GBS bacteriuria     Nausea and vomiting during pregnancy        Risk factors listed above are stable and being addressed as noted.    Domenic Leonard MD  [facility]

## 2024-05-10 ENCOUNTER — OFFICE VISIT (OUTPATIENT)
Dept: MATERNAL FETAL MEDICINE | Facility: CLINIC | Age: 34
End: 2024-05-10
Attending: OBSTETRICS & GYNECOLOGY
Payer: COMMERCIAL

## 2024-05-10 ENCOUNTER — HOSPITAL ENCOUNTER (OUTPATIENT)
Dept: ULTRASOUND IMAGING | Facility: CLINIC | Age: 34
Discharge: HOME | End: 2024-05-10
Attending: OBSTETRICS & GYNECOLOGY
Payer: COMMERCIAL

## 2024-05-10 DIAGNOSIS — O09.899 SINGLE UMBILICAL ARTERY AFFECTING MANAGEMENT OF MOTHER IN SINGLETON PREGNANCY, ANTEPARTUM: Primary | ICD-10-CM

## 2024-05-10 DIAGNOSIS — O09.899 SINGLE UMBILICAL ARTERY AFFECTING MANAGEMENT OF MOTHER IN SINGLETON PREGNANCY, ANTEPARTUM: ICD-10-CM

## 2024-05-10 DIAGNOSIS — O35.DXX0 PREGNANCY COMPLICATED BY FETAL GASTROINTESTINAL ABNORMALITY, SINGLE OR UNSPECIFIED FETUS: ICD-10-CM

## 2024-05-10 DIAGNOSIS — O36.60X0 EXCESSIVE FETAL GROWTH AFFECTING MANAGEMENT OF PREGNANCY, ANTEPARTUM, SINGLE OR UNSPECIFIED FETUS: ICD-10-CM

## 2024-05-10 PROCEDURE — 76819 FETAL BIOPHYS PROFIL W/O NST: CPT

## 2024-05-10 PROCEDURE — 76819 FETAL BIOPHYS PROFIL W/O NST: CPT | Mod: 26 | Performed by: OBSTETRICS & GYNECOLOGY

## 2024-05-10 NOTE — PROGRESS NOTES
"Please see \"Imaging\" tab under \"Chart Review\" for details of today's US at the [facility].    Caesar Dominguez MD  Maternal-Fetal Medicine    "

## 2024-05-15 ENCOUNTER — PRENATAL OFFICE VISIT (OUTPATIENT)
Dept: OBGYN | Facility: CLINIC | Age: 34
End: 2024-05-15
Payer: COMMERCIAL

## 2024-05-15 VITALS — DIASTOLIC BLOOD PRESSURE: 62 MMHG | WEIGHT: 209 LBS | BODY MASS INDEX: 37.02 KG/M2 | SYSTOLIC BLOOD PRESSURE: 110 MMHG

## 2024-05-15 DIAGNOSIS — O09.899 TWO VESSEL UMBILICAL CORD, ANTEPARTUM: ICD-10-CM

## 2024-05-15 DIAGNOSIS — Z98.890 HISTORY OF LOOP ELECTROSURGICAL EXCISION PROCEDURE (LEEP) OF CERVIX AFFECTING PREGNANCY IN THIRD TRIMESTER: ICD-10-CM

## 2024-05-15 DIAGNOSIS — O36.63X0 EXCESSIVE FETAL GROWTH AFFECTING MANAGEMENT OF PREGNANCY IN THIRD TRIMESTER, SINGLE OR UNSPECIFIED FETUS: Primary | ICD-10-CM

## 2024-05-15 DIAGNOSIS — O34.43 HISTORY OF LOOP ELECTROSURGICAL EXCISION PROCEDURE (LEEP) OF CERVIX AFFECTING PREGNANCY IN THIRD TRIMESTER: ICD-10-CM

## 2024-05-15 DIAGNOSIS — R82.71 GBS BACTERIURIA: ICD-10-CM

## 2024-05-15 DIAGNOSIS — O21.9 NAUSEA AND VOMITING DURING PREGNANCY: ICD-10-CM

## 2024-05-15 PROCEDURE — 99207 PR COMPLICATED OB VISIT: CPT | Performed by: OBSTETRICS & GYNECOLOGY

## 2024-05-15 NOTE — PROGRESS NOTES
No c/o's. Has weekly BPPs w/ MFM due to 2VC. Cx-0/70/-3/Vtx. Of note, Pt has narrow subpubic angle so I did advise Pt that we'll have to be mindful of this if her labor ends up being protracted.  Fetal movement counts BID, rupture of membranes/labor precautions reviewed.  RTC 1 week(s).    Encounter Diagnoses   Name Primary?    Excessive fetal growth affecting management of pregnancy in third trimester, single or unspecified fetus Yes    Two vessel umbilical cord, antepartum     History of loop electrosurgical excision procedure (LEEP) of cervix affecting pregnancy in third trimester     GBS bacteriuria     Nausea and vomiting during pregnancy        Risk factors listed above are stable and being addressed as noted.    Domenic Leonard MD  [facility] [place]

## 2024-05-17 ENCOUNTER — HOSPITAL ENCOUNTER (OUTPATIENT)
Dept: ULTRASOUND IMAGING | Facility: CLINIC | Age: 34
Discharge: HOME | End: 2024-05-17
Attending: OBSTETRICS & GYNECOLOGY
Payer: COMMERCIAL

## 2024-05-17 ENCOUNTER — OFFICE VISIT (OUTPATIENT)
Dept: MATERNAL FETAL MEDICINE | Facility: CLINIC | Age: 34
End: 2024-05-17
Attending: OBSTETRICS & GYNECOLOGY
Payer: COMMERCIAL

## 2024-05-17 DIAGNOSIS — O09.899 SINGLE UMBILICAL ARTERY AFFECTING MANAGEMENT OF MOTHER IN SINGLETON PREGNANCY, ANTEPARTUM: Primary | ICD-10-CM

## 2024-05-17 DIAGNOSIS — O09.899 SINGLE UMBILICAL ARTERY AFFECTING MANAGEMENT OF MOTHER IN SINGLETON PREGNANCY, ANTEPARTUM: ICD-10-CM

## 2024-05-17 PROCEDURE — 76819 FETAL BIOPHYS PROFIL W/O NST: CPT

## 2024-05-17 PROCEDURE — 76819 FETAL BIOPHYS PROFIL W/O NST: CPT | Mod: 26 | Performed by: OBSTETRICS & GYNECOLOGY

## 2024-05-17 NOTE — NURSING NOTE
Patient reports good fetal movement, denies contractions, leaking of fluid, or bleeding.    SBAR given to LORRI ACUNA, see their note in Epic.       Pt here for TB skin test read. PPD negative. 0mm induration. Pt given negative results and Donta forms. Son denies questions or concerns at this time.

## 2024-05-17 NOTE — PROGRESS NOTES
"Please see \"Imaging\" tab under \"Chart Review\" for details of today's US at the [facility].    Caesar Dominguez MD  Maternal-Fetal Medicine    "

## 2024-05-21 ENCOUNTER — PRENATAL OFFICE VISIT (OUTPATIENT)
Dept: OBGYN | Facility: CLINIC | Age: 34
End: 2024-05-21
Payer: COMMERCIAL

## 2024-05-21 VITALS — DIASTOLIC BLOOD PRESSURE: 76 MMHG | WEIGHT: 209.2 LBS | SYSTOLIC BLOOD PRESSURE: 130 MMHG | BODY MASS INDEX: 37.06 KG/M2

## 2024-05-21 DIAGNOSIS — O36.63X0 EXCESSIVE FETAL GROWTH AFFECTING MANAGEMENT OF PREGNANCY IN THIRD TRIMESTER, SINGLE OR UNSPECIFIED FETUS: Primary | ICD-10-CM

## 2024-05-21 DIAGNOSIS — R82.71 GBS BACTERIURIA: ICD-10-CM

## 2024-05-21 DIAGNOSIS — O34.43 HISTORY OF LOOP ELECTROSURGICAL EXCISION PROCEDURE (LEEP) OF CERVIX AFFECTING PREGNANCY IN THIRD TRIMESTER: ICD-10-CM

## 2024-05-21 DIAGNOSIS — O21.9 NAUSEA AND VOMITING DURING PREGNANCY: ICD-10-CM

## 2024-05-21 DIAGNOSIS — Z98.890 HISTORY OF LOOP ELECTROSURGICAL EXCISION PROCEDURE (LEEP) OF CERVIX AFFECTING PREGNANCY IN THIRD TRIMESTER: ICD-10-CM

## 2024-05-21 DIAGNOSIS — O09.899 TWO VESSEL UMBILICAL CORD, ANTEPARTUM: ICD-10-CM

## 2024-05-21 PROCEDURE — 99207 PR COMPLICATED OB VISIT: CPT | Performed by: OBSTETRICS & GYNECOLOGY

## 2024-05-21 NOTE — NURSING NOTE
"Chief Complaint   Patient presents with    Prenatal Care     38 weeks 5 days   MFM following          Initial /76 (BP Location: Right arm, Cuff Size: Adult Large)   Wt 94.9 kg (209 lb 3.2 oz)   LMP 2023   BMI 37.06 kg/m   Estimated body mass index is 37.06 kg/m  as calculated from the following:    Height as of 23: 1.6 m (5' 3\").    Weight as of this encounter: 94.9 kg (209 lb 3.2 oz).  BP completed using cuff size: large    Questioned patient about current smoking habits.  Pt. has never smoked.    38w5d       The following HM Due: NONE        +FM daily   +Swelling in feet and ankles   MFM following        Corazon Whalen, BEE on 2024 at 2:45 PM   "

## 2024-05-21 NOTE — PROGRESS NOTES
No c/o's. Last MFM BPP 5/17 was 8/8, Vtx, MFM recommended considering induction at 39+ weeks due to 2VC and LGA. Cx-0/70/-1/Vtx. Fetal movement counts BID, rupture of membranes/labor precautions reviewed. Schedule Cytotec 5/23, Indxn 5/24.    Encounter Diagnoses   Name Primary?    Excessive fetal growth affecting management of pregnancy in third trimester, single or unspecified fetus Yes    GBS bacteriuria     History of loop electrosurgical excision procedure (LEEP) of cervix affecting pregnancy in third trimester     Nausea and vomiting during pregnancy     Two vessel umbilical cord, antepartum        Risk factors listed above are stable and being addressed as noted.    Domenic Leonard MD  [facility]

## 2024-05-23 ENCOUNTER — ANESTHESIA EVENT (OUTPATIENT)
Dept: SURGERY | Facility: CLINIC | Age: 34
End: 2024-05-23
Payer: COMMERCIAL

## 2024-05-23 ENCOUNTER — HOSPITAL ENCOUNTER (INPATIENT)
Facility: CLINIC | Age: 34
LOS: 4 days | Discharge: HOME | End: 2024-05-27
Attending: OBSTETRICS & GYNECOLOGY | Admitting: STUDENT IN AN ORGANIZED HEALTH CARE EDUCATION/TRAINING PROGRAM
Payer: COMMERCIAL

## 2024-05-23 ENCOUNTER — ANESTHESIA (OUTPATIENT)
Dept: SURGERY | Facility: CLINIC | Age: 34
End: 2024-05-23
Payer: COMMERCIAL

## 2024-05-23 DIAGNOSIS — O33.3XX0 CEPHALOPELVIC DISPROPORTION DUE TO OUTLET CONTRACTION OF PELVIS, SINGLE OR UNSPECIFIED FETUS: Primary | ICD-10-CM

## 2024-05-23 LAB
ABO/RH(D): NORMAL
ANTIBODY SCREEN: NEGATIVE
HGB BLD-MCNC: 10.8 G/DL (ref 11.7–15.7)
SPECIMEN EXPIRATION DATE: NORMAL

## 2024-05-23 PROCEDURE — 250N000013 HC RX MED GY IP 250 OP 250 PS 637: Performed by: OBSTETRICS & GYNECOLOGY

## 2024-05-23 PROCEDURE — 85018 HEMOGLOBIN: CPT | Performed by: OBSTETRICS & GYNECOLOGY

## 2024-05-23 PROCEDURE — 86900 BLOOD TYPING SEROLOGIC ABO: CPT | Performed by: OBSTETRICS & GYNECOLOGY

## 2024-05-23 PROCEDURE — 120N000001 HC R&B MED SURG/OB

## 2024-05-23 PROCEDURE — 86780 TREPONEMA PALLIDUM: CPT | Performed by: OBSTETRICS & GYNECOLOGY

## 2024-05-23 PROCEDURE — 86762 RUBELLA ANTIBODY: CPT | Performed by: STUDENT IN AN ORGANIZED HEALTH CARE EDUCATION/TRAINING PROGRAM

## 2024-05-23 RX ORDER — PROCHLORPERAZINE 25 MG
25 SUPPOSITORY, RECTAL RECTAL EVERY 12 HOURS PRN
Status: DISCONTINUED | OUTPATIENT
Start: 2024-05-23 | End: 2024-05-25

## 2024-05-23 RX ORDER — TRANEXAMIC ACID 10 MG/ML
1 INJECTION, SOLUTION INTRAVENOUS EVERY 30 MIN PRN
Status: DISCONTINUED | OUTPATIENT
Start: 2024-05-23 | End: 2024-05-25

## 2024-05-23 RX ORDER — OXYTOCIN 10 [USP'U]/ML
10 INJECTION, SOLUTION INTRAMUSCULAR; INTRAVENOUS
Status: DISCONTINUED | OUTPATIENT
Start: 2024-05-23 | End: 2024-05-25

## 2024-05-23 RX ORDER — FENTANYL CITRATE 50 UG/ML
100 INJECTION, SOLUTION INTRAMUSCULAR; INTRAVENOUS
Status: DISCONTINUED | OUTPATIENT
Start: 2024-05-23 | End: 2024-05-25

## 2024-05-23 RX ORDER — PROCHLORPERAZINE MALEATE 10 MG
10 TABLET ORAL EVERY 6 HOURS PRN
Status: DISCONTINUED | OUTPATIENT
Start: 2024-05-23 | End: 2024-05-25

## 2024-05-23 RX ORDER — CARBOPROST TROMETHAMINE 250 UG/ML
250 INJECTION, SOLUTION INTRAMUSCULAR
Status: DISCONTINUED | OUTPATIENT
Start: 2024-05-23 | End: 2024-05-25

## 2024-05-23 RX ORDER — PENICILLIN G 3000000 [IU]/50ML
3 INJECTION, SOLUTION INTRAVENOUS EVERY 4 HOURS
Status: DISCONTINUED | OUTPATIENT
Start: 2024-05-24 | End: 2024-05-25

## 2024-05-23 RX ORDER — ONDANSETRON 2 MG/ML
4 INJECTION INTRAMUSCULAR; INTRAVENOUS EVERY 6 HOURS PRN
Status: DISCONTINUED | OUTPATIENT
Start: 2024-05-23 | End: 2024-05-25

## 2024-05-23 RX ORDER — MISOPROSTOL 100 UG/1
25 TABLET ORAL
Status: DISCONTINUED | OUTPATIENT
Start: 2024-05-23 | End: 2024-05-25

## 2024-05-23 RX ORDER — CITRIC ACID/SODIUM CITRATE 334-500MG
30 SOLUTION, ORAL ORAL
Status: DISCONTINUED | OUTPATIENT
Start: 2024-05-23 | End: 2024-05-25

## 2024-05-23 RX ORDER — NALOXONE HYDROCHLORIDE 0.4 MG/ML
0.4 INJECTION, SOLUTION INTRAMUSCULAR; INTRAVENOUS; SUBCUTANEOUS
Status: DISCONTINUED | OUTPATIENT
Start: 2024-05-23 | End: 2024-05-25

## 2024-05-23 RX ORDER — HYDROXYZINE HYDROCHLORIDE 50 MG/1
50 TABLET, FILM COATED ORAL
Status: DISCONTINUED | OUTPATIENT
Start: 2024-05-23 | End: 2024-05-25

## 2024-05-23 RX ORDER — IBUPROFEN 800 MG/1
800 TABLET, FILM COATED ORAL
Status: DISCONTINUED | OUTPATIENT
Start: 2024-05-23 | End: 2024-05-25

## 2024-05-23 RX ORDER — LOPERAMIDE HCL 2 MG
2 CAPSULE ORAL
Status: DISCONTINUED | OUTPATIENT
Start: 2024-05-23 | End: 2024-05-25

## 2024-05-23 RX ORDER — ONDANSETRON 4 MG/1
4 TABLET, ORALLY DISINTEGRATING ORAL EVERY 6 HOURS PRN
Status: DISCONTINUED | OUTPATIENT
Start: 2024-05-23 | End: 2024-05-25

## 2024-05-23 RX ORDER — METOCLOPRAMIDE HYDROCHLORIDE 5 MG/ML
10 INJECTION INTRAMUSCULAR; INTRAVENOUS EVERY 6 HOURS PRN
Status: DISCONTINUED | OUTPATIENT
Start: 2024-05-23 | End: 2024-05-25

## 2024-05-23 RX ORDER — METOCLOPRAMIDE 10 MG/1
10 TABLET ORAL EVERY 6 HOURS PRN
Status: DISCONTINUED | OUTPATIENT
Start: 2024-05-23 | End: 2024-05-25

## 2024-05-23 RX ORDER — PENICILLIN G POTASSIUM 5000000 [IU]/1
5 INJECTION, POWDER, FOR SOLUTION INTRAMUSCULAR; INTRAVENOUS ONCE
Status: COMPLETED | OUTPATIENT
Start: 2024-05-23 | End: 2024-05-24

## 2024-05-23 RX ORDER — OXYTOCIN/0.9 % SODIUM CHLORIDE 30/500 ML
340 PLASTIC BAG, INJECTION (ML) INTRAVENOUS CONTINUOUS PRN
Status: DISCONTINUED | OUTPATIENT
Start: 2024-05-23 | End: 2024-05-25

## 2024-05-23 RX ORDER — MISOPROSTOL 200 UG/1
400 TABLET ORAL
Status: DISCONTINUED | OUTPATIENT
Start: 2024-05-23 | End: 2024-05-25

## 2024-05-23 RX ORDER — NALOXONE HYDROCHLORIDE 0.4 MG/ML
0.2 INJECTION, SOLUTION INTRAMUSCULAR; INTRAVENOUS; SUBCUTANEOUS
Status: DISCONTINUED | OUTPATIENT
Start: 2024-05-23 | End: 2024-05-25

## 2024-05-23 RX ORDER — METHYLERGONOVINE MALEATE 0.2 MG/ML
200 INJECTION INTRAVENOUS
Status: DISCONTINUED | OUTPATIENT
Start: 2024-05-23 | End: 2024-05-25

## 2024-05-23 RX ORDER — LOPERAMIDE HCL 2 MG
4 CAPSULE ORAL
Status: DISCONTINUED | OUTPATIENT
Start: 2024-05-23 | End: 2024-05-25

## 2024-05-23 RX ORDER — FENTANYL CITRATE-0.9 % NACL/PF 10 MCG/ML
100 PLASTIC BAG, INJECTION (ML) INTRAVENOUS EVERY 5 MIN PRN
Status: DISCONTINUED | OUTPATIENT
Start: 2024-05-23 | End: 2024-05-25

## 2024-05-23 RX ORDER — NALBUPHINE HYDROCHLORIDE 20 MG/ML
2.5-5 INJECTION, SOLUTION INTRAMUSCULAR; INTRAVENOUS; SUBCUTANEOUS EVERY 6 HOURS PRN
Status: DISCONTINUED | OUTPATIENT
Start: 2024-05-23 | End: 2024-05-25

## 2024-05-23 RX ORDER — KETOROLAC TROMETHAMINE 30 MG/ML
30 INJECTION, SOLUTION INTRAMUSCULAR; INTRAVENOUS
Status: DISCONTINUED | OUTPATIENT
Start: 2024-05-23 | End: 2024-05-25

## 2024-05-23 RX ORDER — FENTANYL CITRATE 50 UG/ML
100 INJECTION, SOLUTION INTRAMUSCULAR; INTRAVENOUS ONCE
Status: DISCONTINUED | OUTPATIENT
Start: 2024-05-23 | End: 2024-05-25

## 2024-05-23 RX ORDER — OXYTOCIN/0.9 % SODIUM CHLORIDE 30/500 ML
100-340 PLASTIC BAG, INJECTION (ML) INTRAVENOUS CONTINUOUS PRN
Status: DISCONTINUED | OUTPATIENT
Start: 2024-05-23 | End: 2024-05-25

## 2024-05-23 RX ORDER — MISOPROSTOL 200 UG/1
800 TABLET ORAL
Status: DISCONTINUED | OUTPATIENT
Start: 2024-05-23 | End: 2024-05-25

## 2024-05-23 RX ORDER — BUPIVACAINE HYDROCHLORIDE 2.5 MG/ML
10 INJECTION, SOLUTION EPIDURAL; INFILTRATION; INTRACAUDAL ONCE
Status: DISCONTINUED | OUTPATIENT
Start: 2024-05-23 | End: 2024-05-25

## 2024-05-23 RX ADMIN — MISOPROSTOL 25 MCG: 100 TABLET ORAL at 22:45

## 2024-05-23 RX ADMIN — MISOPROSTOL 25 MCG: 100 TABLET ORAL at 20:45

## 2024-05-23 ASSESSMENT — ACTIVITIES OF DAILY LIVING (ADL)
ADLS_ACUITY_SCORE: 18

## 2024-05-24 LAB — T PALLIDUM AB SER QL: NONREACTIVE

## 2024-05-24 PROCEDURE — 250N000013 HC RX MED GY IP 250 OP 250 PS 637: Performed by: STUDENT IN AN ORGANIZED HEALTH CARE EDUCATION/TRAINING PROGRAM

## 2024-05-24 PROCEDURE — 258N000003 HC RX IP 258 OP 636: Performed by: STUDENT IN AN ORGANIZED HEALTH CARE EDUCATION/TRAINING PROGRAM

## 2024-05-24 PROCEDURE — 258N000003 HC RX IP 258 OP 636: Performed by: OBSTETRICS & GYNECOLOGY

## 2024-05-24 PROCEDURE — 120N000001 HC R&B MED SURG/OB

## 2024-05-24 PROCEDURE — 250N000013 HC RX MED GY IP 250 OP 250 PS 637: Performed by: OBSTETRICS & GYNECOLOGY

## 2024-05-24 PROCEDURE — 250N000011 HC RX IP 250 OP 636: Mod: JZ | Performed by: OBSTETRICS & GYNECOLOGY

## 2024-05-24 PROCEDURE — 250N000011 HC RX IP 250 OP 636: Performed by: ANESTHESIOLOGY

## 2024-05-24 RX ORDER — SODIUM CHLORIDE, SODIUM LACTATE, POTASSIUM CHLORIDE, CALCIUM CHLORIDE 600; 310; 30; 20 MG/100ML; MG/100ML; MG/100ML; MG/100ML
INJECTION, SOLUTION INTRAVENOUS CONTINUOUS PRN
Status: DISCONTINUED | OUTPATIENT
Start: 2024-05-24 | End: 2024-05-25

## 2024-05-24 RX ORDER — BUPIVACAINE HYDROCHLORIDE 2.5 MG/ML
INJECTION, SOLUTION EPIDURAL; INFILTRATION; INTRACAUDAL
Status: COMPLETED | OUTPATIENT
Start: 2024-05-24 | End: 2024-05-24

## 2024-05-24 RX ORDER — LIDOCAINE 40 MG/G
CREAM TOPICAL
Status: DISCONTINUED | OUTPATIENT
Start: 2024-05-24 | End: 2024-05-25

## 2024-05-24 RX ORDER — OXYTOCIN/0.9 % SODIUM CHLORIDE 30/500 ML
1-24 PLASTIC BAG, INJECTION (ML) INTRAVENOUS CONTINUOUS
Status: DISCONTINUED | OUTPATIENT
Start: 2024-05-24 | End: 2024-05-25

## 2024-05-24 RX ADMIN — SODIUM CHLORIDE, POTASSIUM CHLORIDE, SODIUM LACTATE AND CALCIUM CHLORIDE: 600; 310; 30; 20 INJECTION, SOLUTION INTRAVENOUS at 23:55

## 2024-05-24 RX ADMIN — SODIUM CHLORIDE, POTASSIUM CHLORIDE, SODIUM LACTATE AND CALCIUM CHLORIDE 1000 ML: 600; 310; 30; 20 INJECTION, SOLUTION INTRAVENOUS at 22:50

## 2024-05-24 RX ADMIN — METOCLOPRAMIDE HYDROCHLORIDE 10 MG: 5 INJECTION INTRAMUSCULAR; INTRAVENOUS at 23:20

## 2024-05-24 RX ADMIN — MISOPROSTOL 25 MCG: 100 TABLET ORAL at 00:47

## 2024-05-24 RX ADMIN — MISOPROSTOL 25 MCG: 100 TABLET ORAL at 15:23

## 2024-05-24 RX ADMIN — Medication: at 23:46

## 2024-05-24 RX ADMIN — BUPIVACAINE HYDROCHLORIDE 10 ML: 2.5 INJECTION, SOLUTION EPIDURAL; INFILTRATION; INTRACAUDAL at 23:42

## 2024-05-24 RX ADMIN — HYDROXYZINE HYDROCHLORIDE 50 MG: 50 TABLET, FILM COATED ORAL at 00:47

## 2024-05-24 RX ADMIN — DINOPROSTONE 10 MG: 10 INSERT VAGINAL at 19:40

## 2024-05-24 RX ADMIN — MISOPROSTOL 25 MCG: 100 TABLET ORAL at 05:34

## 2024-05-24 RX ADMIN — MISOPROSTOL 25 MCG: 100 TABLET ORAL at 11:32

## 2024-05-24 RX ADMIN — MISOPROSTOL 25 MCG: 100 TABLET ORAL at 17:37

## 2024-05-24 RX ADMIN — MISOPROSTOL 25 MCG: 100 TABLET ORAL at 02:48

## 2024-05-24 RX ADMIN — HYDROXYZINE HYDROCHLORIDE 50 MG: 50 TABLET, FILM COATED ORAL at 02:48

## 2024-05-24 RX ADMIN — MISOPROSTOL 25 MCG: 100 TABLET ORAL at 09:38

## 2024-05-24 RX ADMIN — MISOPROSTOL 25 MCG: 100 TABLET ORAL at 07:30

## 2024-05-24 RX ADMIN — PENICILLIN G POTASSIUM 5 MILLION UNITS: 5000000 POWDER, FOR SOLUTION INTRAMUSCULAR; INTRAPLEURAL; INTRATHECAL; INTRAVENOUS at 22:56

## 2024-05-24 RX ADMIN — MISOPROSTOL 25 MCG: 100 TABLET ORAL at 13:28

## 2024-05-24 ASSESSMENT — ACTIVITIES OF DAILY LIVING (ADL)
ADLS_ACUITY_SCORE: 18

## 2024-05-24 NOTE — PROVIDER NOTIFICATION
05/24/24 0908   Provider Notification   Provider Name/Title Balaji   Method of Notification At Bedside   Request Evaluate in Person   Notification Reason Status Update     POC reviewed with pt. Bedside US to confirm vertex position. Give next two doses of cytotec then reevaluate cervix.

## 2024-05-24 NOTE — H&P
OB History and Physical - Update  [facility]  2024    No significant change in general health status based on examination of the patient and review of Nursing Admission Database and prenatal record.    Connie Bhakta is a 33 year old  at 39w1d who was admitted last night for IOL in the setting of LGA fetus and 2VC. She has received 6 doses of PO misoprostol.    FHT  Baseline: 135  Variability: Moderate  Accels: Present  Decels: Absent  [place]: 1-3 in 10 minutes    Abdomen: Nontender.   SVE: 70/-3 on admit last night  BSUS: Vertex     Pregnancy complicated by:  - LGA fetus  - 2 vessel cord  - IUI  - Hx LEEP  - Borderline hypothyroidism  - GBS bacteruria   - Bilateral narrow angle glaucoma s/p laser treatment  - Anxiety  - Obesity  - Enlarged fetal stomach     Plan  - Continue cervical ripening PO misoprostol  - AROM/Pitocin PRN  - Plans epidural  - Anticipate     Salazar Carpio MD MPH  [facility]  2024 10:33 AM

## 2024-05-24 NOTE — CARE PLAN
Pt here for induction of labor. Reports feeling intermittent cramping, denies VB, LOF. Endorses fetal movement. Plan of care discussed with patient.

## 2024-05-24 NOTE — PROVIDER NOTIFICATION
05/24/24 1430   Provider Notification   Notification Reason Status Update     Updated on FHT's, prolonged decel while getting on ball. Strip reviewed. Continue cytotec as appropriate. Ok to hold if need be.

## 2024-05-24 NOTE — PROVIDER NOTIFICATION
05/23/24 1935   Provider Notification   Provider Name/Title Dr Villeda   Method of Notification In Department     MD on unit. Updated on pt arrival for cervical ripening. Orders received. PCN to start when pt active.

## 2024-05-24 NOTE — PROVIDER NOTIFICATION
05/24/24 1750   Provider Notification   Provider Name/Title Dr. Ceron   Method of Notification Phone   Notification Reason Status Update  (updated of status. Pt will recieve 12th dose of po miso @1930, pt has reorted zero pain, feels some int tightness or pressure but nothing uncomfortable. contrx more frequent intially after po miso, then space out. t's cat 1.)     Dr ceron states to check cervix at 1930ish, and place cervidil, unless advanced dilation.

## 2024-05-24 NOTE — PROVIDER NOTIFICATION
05/24/24 1322   Provider Notification   Notification Reason Status Update     Check cervix, continue oral cytotec up to 12 doses as needed. Updated on T

## 2024-05-25 PROCEDURE — 360N000076 HC SURGERY LEVEL 3, PER MIN: Performed by: OBSTETRICS & GYNECOLOGY

## 2024-05-25 PROCEDURE — 250N000009 HC RX 250: Performed by: OBSTETRICS & GYNECOLOGY

## 2024-05-25 PROCEDURE — 250N000009 HC RX 250: Performed by: NURSE ANESTHETIST, CERTIFIED REGISTERED

## 2024-05-25 PROCEDURE — 250N000011 HC RX IP 250 OP 636: Mod: JZ | Performed by: OBSTETRICS & GYNECOLOGY

## 2024-05-25 PROCEDURE — 250N000011 HC RX IP 250 OP 636: Performed by: OBSTETRICS & GYNECOLOGY

## 2024-05-25 PROCEDURE — 250N000009 HC RX 250: Performed by: STUDENT IN AN ORGANIZED HEALTH CARE EDUCATION/TRAINING PROGRAM

## 2024-05-25 PROCEDURE — 258N000003 HC RX IP 258 OP 636: Performed by: STUDENT IN AN ORGANIZED HEALTH CARE EDUCATION/TRAINING PROGRAM

## 2024-05-25 PROCEDURE — 250N000011 HC RX IP 250 OP 636: Performed by: ANESTHESIOLOGY

## 2024-05-25 PROCEDURE — 250N000011 HC RX IP 250 OP 636: Performed by: NURSE ANESTHETIST, CERTIFIED REGISTERED

## 2024-05-25 PROCEDURE — 370N000017 HC ANESTHESIA TECHNICAL FEE, PER MIN: Performed by: OBSTETRICS & GYNECOLOGY

## 2024-05-25 PROCEDURE — 120N000013 HC R&B IMCU

## 2024-05-25 PROCEDURE — 710N000009 HC RECOVERY PHASE 1, LEVEL 1, PER MIN: Performed by: OBSTETRICS & GYNECOLOGY

## 2024-05-25 PROCEDURE — 250N000013 HC RX MED GY IP 250 OP 250 PS 637: Performed by: OBSTETRICS & GYNECOLOGY

## 2024-05-25 PROCEDURE — 272N000001 HC OR GENERAL SUPPLY STERILE: Performed by: OBSTETRICS & GYNECOLOGY

## 2024-05-25 PROCEDURE — 59510 CESAREAN DELIVERY: CPT | Performed by: OBSTETRICS & GYNECOLOGY

## 2024-05-25 PROCEDURE — 258N000003 HC RX IP 258 OP 636: Performed by: NURSE ANESTHETIST, CERTIFIED REGISTERED

## 2024-05-25 RX ORDER — DEXTROSE, SODIUM CHLORIDE, SODIUM LACTATE, POTASSIUM CHLORIDE, AND CALCIUM CHLORIDE 5; .6; .31; .03; .02 G/100ML; G/100ML; G/100ML; G/100ML; G/100ML
INJECTION, SOLUTION INTRAVENOUS CONTINUOUS
Status: DISCONTINUED | OUTPATIENT
Start: 2024-05-25 | End: 2024-05-27 | Stop reason: HOSPADM

## 2024-05-25 RX ORDER — NALOXONE HYDROCHLORIDE 0.4 MG/ML
0.2 INJECTION, SOLUTION INTRAMUSCULAR; INTRAVENOUS; SUBCUTANEOUS
Status: DISCONTINUED | OUTPATIENT
Start: 2024-05-25 | End: 2024-05-27 | Stop reason: HOSPADM

## 2024-05-25 RX ORDER — HYDROCORTISONE 25 MG/G
CREAM TOPICAL 3 TIMES DAILY PRN
Status: DISCONTINUED | OUTPATIENT
Start: 2024-05-25 | End: 2024-05-27 | Stop reason: HOSPADM

## 2024-05-25 RX ORDER — OXYTOCIN 10 [USP'U]/ML
10 INJECTION, SOLUTION INTRAMUSCULAR; INTRAVENOUS
Status: DISCONTINUED | OUTPATIENT
Start: 2024-05-25 | End: 2024-05-25 | Stop reason: HOSPADM

## 2024-05-25 RX ORDER — BISACODYL 10 MG
10 SUPPOSITORY, RECTAL RECTAL DAILY PRN
Status: DISCONTINUED | OUTPATIENT
Start: 2024-05-27 | End: 2024-05-27 | Stop reason: HOSPADM

## 2024-05-25 RX ORDER — OXYTOCIN/0.9 % SODIUM CHLORIDE 30/500 ML
340 PLASTIC BAG, INJECTION (ML) INTRAVENOUS CONTINUOUS PRN
Status: DISCONTINUED | OUTPATIENT
Start: 2024-05-25 | End: 2024-05-25 | Stop reason: HOSPADM

## 2024-05-25 RX ORDER — KETOROLAC TROMETHAMINE 30 MG/ML
INJECTION, SOLUTION INTRAMUSCULAR; INTRAVENOUS PRN
Status: DISCONTINUED | OUTPATIENT
Start: 2024-05-25 | End: 2024-05-25

## 2024-05-25 RX ORDER — LOPERAMIDE HCL 2 MG
4 CAPSULE ORAL
Status: DISCONTINUED | OUTPATIENT
Start: 2024-05-25 | End: 2024-05-25 | Stop reason: HOSPADM

## 2024-05-25 RX ORDER — OXYCODONE HYDROCHLORIDE 5 MG/1
5 TABLET ORAL EVERY 4 HOURS PRN
Status: DISCONTINUED | OUTPATIENT
Start: 2024-05-25 | End: 2024-05-27 | Stop reason: HOSPADM

## 2024-05-25 RX ORDER — IBUPROFEN 800 MG/1
800 TABLET, FILM COATED ORAL EVERY 6 HOURS
Status: DISCONTINUED | OUTPATIENT
Start: 2024-05-26 | End: 2024-05-27 | Stop reason: HOSPADM

## 2024-05-25 RX ORDER — METHYLERGONOVINE MALEATE 0.2 MG/ML
200 INJECTION INTRAVENOUS
Status: DISCONTINUED | OUTPATIENT
Start: 2024-05-25 | End: 2024-05-27 | Stop reason: HOSPADM

## 2024-05-25 RX ORDER — ONDANSETRON 2 MG/ML
INJECTION INTRAMUSCULAR; INTRAVENOUS PRN
Status: DISCONTINUED | OUTPATIENT
Start: 2024-05-25 | End: 2024-05-25

## 2024-05-25 RX ORDER — CEFAZOLIN SODIUM 1 G/3ML
INJECTION, POWDER, FOR SOLUTION INTRAMUSCULAR; INTRAVENOUS PRN
Status: DISCONTINUED | OUTPATIENT
Start: 2024-05-25 | End: 2024-05-25

## 2024-05-25 RX ORDER — DEXAMETHASONE SODIUM PHOSPHATE 4 MG/ML
INJECTION, SOLUTION INTRA-ARTICULAR; INTRALESIONAL; INTRAMUSCULAR; INTRAVENOUS; SOFT TISSUE PRN
Status: DISCONTINUED | OUTPATIENT
Start: 2024-05-25 | End: 2024-05-25

## 2024-05-25 RX ORDER — MISOPROSTOL 200 UG/1
800 TABLET ORAL
Status: DISCONTINUED | OUTPATIENT
Start: 2024-05-25 | End: 2024-05-27 | Stop reason: HOSPADM

## 2024-05-25 RX ORDER — TRANEXAMIC ACID 10 MG/ML
1 INJECTION, SOLUTION INTRAVENOUS EVERY 30 MIN PRN
Status: DISCONTINUED | OUTPATIENT
Start: 2024-05-25 | End: 2024-05-27 | Stop reason: HOSPADM

## 2024-05-25 RX ORDER — CARBOPROST TROMETHAMINE 250 UG/ML
250 INJECTION, SOLUTION INTRAMUSCULAR
Status: DISCONTINUED | OUTPATIENT
Start: 2024-05-25 | End: 2024-05-27 | Stop reason: HOSPADM

## 2024-05-25 RX ORDER — MORPHINE SULFATE 1 MG/ML
INJECTION, SOLUTION EPIDURAL; INTRATHECAL; INTRAVENOUS PRN
Status: DISCONTINUED | OUTPATIENT
Start: 2024-05-25 | End: 2024-05-25

## 2024-05-25 RX ORDER — PROCHLORPERAZINE MALEATE 10 MG
10 TABLET ORAL EVERY 6 HOURS PRN
Status: DISCONTINUED | OUTPATIENT
Start: 2024-05-25 | End: 2024-05-27 | Stop reason: HOSPADM

## 2024-05-25 RX ORDER — ONDANSETRON 2 MG/ML
4 INJECTION INTRAMUSCULAR; INTRAVENOUS EVERY 6 HOURS PRN
Status: DISCONTINUED | OUTPATIENT
Start: 2024-05-25 | End: 2024-05-27 | Stop reason: HOSPADM

## 2024-05-25 RX ORDER — METHYLERGONOVINE MALEATE 0.2 MG/ML
200 INJECTION INTRAVENOUS
Status: DISCONTINUED | OUTPATIENT
Start: 2024-05-25 | End: 2024-05-25 | Stop reason: HOSPADM

## 2024-05-25 RX ORDER — LIDOCAINE HCL/EPINEPHRINE/PF 2%-1:200K
VIAL (ML) INJECTION PRN
Status: DISCONTINUED | OUTPATIENT
Start: 2024-05-25 | End: 2024-05-25

## 2024-05-25 RX ORDER — NALOXONE HYDROCHLORIDE 0.4 MG/ML
0.4 INJECTION, SOLUTION INTRAMUSCULAR; INTRAVENOUS; SUBCUTANEOUS
Status: DISCONTINUED | OUTPATIENT
Start: 2024-05-25 | End: 2024-05-27 | Stop reason: HOSPADM

## 2024-05-25 RX ORDER — MISOPROSTOL 200 UG/1
400 TABLET ORAL
Status: DISCONTINUED | OUTPATIENT
Start: 2024-05-25 | End: 2024-05-27 | Stop reason: HOSPADM

## 2024-05-25 RX ORDER — AZITHROMYCIN 500 MG/5ML
500 INJECTION, POWDER, LYOPHILIZED, FOR SOLUTION INTRAVENOUS
Status: DISCONTINUED | OUTPATIENT
Start: 2024-05-25 | End: 2024-05-25 | Stop reason: HOSPADM

## 2024-05-25 RX ORDER — CEFAZOLIN SODIUM/WATER 2 G/20 ML
2 SYRINGE (ML) INTRAVENOUS SEE ADMIN INSTRUCTIONS
Status: DISCONTINUED | OUTPATIENT
Start: 2024-05-25 | End: 2024-05-25 | Stop reason: HOSPADM

## 2024-05-25 RX ORDER — SIMETHICONE 80 MG
80 TABLET,CHEWABLE ORAL 4 TIMES DAILY PRN
Status: DISCONTINUED | OUTPATIENT
Start: 2024-05-25 | End: 2024-05-27 | Stop reason: HOSPADM

## 2024-05-25 RX ORDER — LIDOCAINE 40 MG/G
CREAM TOPICAL
Status: DISCONTINUED | OUTPATIENT
Start: 2024-05-25 | End: 2024-05-25 | Stop reason: HOSPADM

## 2024-05-25 RX ORDER — OXYTOCIN 10 [USP'U]/ML
10 INJECTION, SOLUTION INTRAMUSCULAR; INTRAVENOUS
Status: DISCONTINUED | OUTPATIENT
Start: 2024-05-25 | End: 2024-05-25

## 2024-05-25 RX ORDER — KETOROLAC TROMETHAMINE 30 MG/ML
30 INJECTION, SOLUTION INTRAMUSCULAR; INTRAVENOUS EVERY 6 HOURS
Status: COMPLETED | OUTPATIENT
Start: 2024-05-25 | End: 2024-05-26

## 2024-05-25 RX ORDER — PHENYLEPHRINE HCL IN 0.9% NACL 50MG/250ML
PLASTIC BAG, INJECTION (ML) INTRAVENOUS CONTINUOUS PRN
Status: DISCONTINUED | OUTPATIENT
Start: 2024-05-25 | End: 2024-05-25

## 2024-05-25 RX ORDER — LIDOCAINE 40 MG/G
CREAM TOPICAL
Status: DISCONTINUED | OUTPATIENT
Start: 2024-05-25 | End: 2024-05-27 | Stop reason: HOSPADM

## 2024-05-25 RX ORDER — MISOPROSTOL 200 UG/1
400 TABLET ORAL
Status: DISCONTINUED | OUTPATIENT
Start: 2024-05-25 | End: 2024-05-25 | Stop reason: HOSPADM

## 2024-05-25 RX ORDER — METOCLOPRAMIDE 10 MG/1
10 TABLET ORAL EVERY 6 HOURS PRN
Status: DISCONTINUED | OUTPATIENT
Start: 2024-05-25 | End: 2024-05-27 | Stop reason: HOSPADM

## 2024-05-25 RX ORDER — MISOPROSTOL 200 UG/1
800 TABLET ORAL
Status: DISCONTINUED | OUTPATIENT
Start: 2024-05-25 | End: 2024-05-25 | Stop reason: HOSPADM

## 2024-05-25 RX ORDER — MODIFIED LANOLIN
OINTMENT (GRAM) TOPICAL
Status: DISCONTINUED | OUTPATIENT
Start: 2024-05-25 | End: 2024-05-27 | Stop reason: HOSPADM

## 2024-05-25 RX ORDER — OXYTOCIN/0.9 % SODIUM CHLORIDE 30/500 ML
100-340 PLASTIC BAG, INJECTION (ML) INTRAVENOUS CONTINUOUS PRN
Status: DISCONTINUED | OUTPATIENT
Start: 2024-05-25 | End: 2024-05-25

## 2024-05-25 RX ORDER — LOPERAMIDE HCL 2 MG
2 CAPSULE ORAL
Status: DISCONTINUED | OUTPATIENT
Start: 2024-05-25 | End: 2024-05-25 | Stop reason: HOSPADM

## 2024-05-25 RX ORDER — LOPERAMIDE HCL 2 MG
4 CAPSULE ORAL
Status: DISCONTINUED | OUTPATIENT
Start: 2024-05-25 | End: 2024-05-27 | Stop reason: HOSPADM

## 2024-05-25 RX ORDER — ACETAMINOPHEN 325 MG/1
975 TABLET ORAL ONCE
Status: COMPLETED | OUTPATIENT
Start: 2024-05-25 | End: 2024-05-25

## 2024-05-25 RX ORDER — ONDANSETRON 4 MG/1
4 TABLET, ORALLY DISINTEGRATING ORAL EVERY 6 HOURS PRN
Status: DISCONTINUED | OUTPATIENT
Start: 2024-05-25 | End: 2024-05-27 | Stop reason: HOSPADM

## 2024-05-25 RX ORDER — LOPERAMIDE HCL 2 MG
2 CAPSULE ORAL
Status: DISCONTINUED | OUTPATIENT
Start: 2024-05-25 | End: 2024-05-27 | Stop reason: HOSPADM

## 2024-05-25 RX ORDER — OXYTOCIN/0.9 % SODIUM CHLORIDE 30/500 ML
PLASTIC BAG, INJECTION (ML) INTRAVENOUS CONTINUOUS PRN
Status: DISCONTINUED | OUTPATIENT
Start: 2024-05-25 | End: 2024-05-25

## 2024-05-25 RX ORDER — SODIUM CHLORIDE, SODIUM LACTATE, POTASSIUM CHLORIDE, CALCIUM CHLORIDE 600; 310; 30; 20 MG/100ML; MG/100ML; MG/100ML; MG/100ML
INJECTION, SOLUTION INTRAVENOUS CONTINUOUS
Status: DISCONTINUED | OUTPATIENT
Start: 2024-05-25 | End: 2024-05-25 | Stop reason: HOSPADM

## 2024-05-25 RX ORDER — CITRIC ACID/SODIUM CITRATE 334-500MG
30 SOLUTION, ORAL ORAL
Status: COMPLETED | OUTPATIENT
Start: 2024-05-25 | End: 2024-05-25

## 2024-05-25 RX ORDER — CARBOPROST TROMETHAMINE 250 UG/ML
250 INJECTION, SOLUTION INTRAMUSCULAR
Status: DISCONTINUED | OUTPATIENT
Start: 2024-05-25 | End: 2024-05-25 | Stop reason: HOSPADM

## 2024-05-25 RX ORDER — PROCHLORPERAZINE 25 MG
25 SUPPOSITORY, RECTAL RECTAL EVERY 12 HOURS PRN
Status: DISCONTINUED | OUTPATIENT
Start: 2024-05-25 | End: 2024-05-27 | Stop reason: HOSPADM

## 2024-05-25 RX ORDER — OXYTOCIN 10 [USP'U]/ML
10 INJECTION, SOLUTION INTRAMUSCULAR; INTRAVENOUS
Status: DISCONTINUED | OUTPATIENT
Start: 2024-05-25 | End: 2024-05-27 | Stop reason: HOSPADM

## 2024-05-25 RX ORDER — SODIUM CHLORIDE, SODIUM LACTATE, POTASSIUM CHLORIDE, CALCIUM CHLORIDE 600; 310; 30; 20 MG/100ML; MG/100ML; MG/100ML; MG/100ML
INJECTION, SOLUTION INTRAVENOUS CONTINUOUS PRN
Status: DISCONTINUED | OUTPATIENT
Start: 2024-05-25 | End: 2024-05-25

## 2024-05-25 RX ORDER — METOCLOPRAMIDE HYDROCHLORIDE 5 MG/ML
10 INJECTION INTRAMUSCULAR; INTRAVENOUS EVERY 6 HOURS PRN
Status: DISCONTINUED | OUTPATIENT
Start: 2024-05-25 | End: 2024-05-27 | Stop reason: HOSPADM

## 2024-05-25 RX ORDER — TRANEXAMIC ACID 10 MG/ML
1 INJECTION, SOLUTION INTRAVENOUS EVERY 30 MIN PRN
Status: DISCONTINUED | OUTPATIENT
Start: 2024-05-25 | End: 2024-05-25 | Stop reason: HOSPADM

## 2024-05-25 RX ORDER — AMOXICILLIN 250 MG
2 CAPSULE ORAL 2 TIMES DAILY
Status: DISCONTINUED | OUTPATIENT
Start: 2024-05-25 | End: 2024-05-27 | Stop reason: HOSPADM

## 2024-05-25 RX ORDER — OXYTOCIN/0.9 % SODIUM CHLORIDE 30/500 ML
340 PLASTIC BAG, INJECTION (ML) INTRAVENOUS CONTINUOUS PRN
Status: DISCONTINUED | OUTPATIENT
Start: 2024-05-25 | End: 2024-05-27 | Stop reason: HOSPADM

## 2024-05-25 RX ORDER — ACETAMINOPHEN 325 MG/1
975 TABLET ORAL EVERY 6 HOURS
Status: DISCONTINUED | OUTPATIENT
Start: 2024-05-25 | End: 2024-05-27 | Stop reason: HOSPADM

## 2024-05-25 RX ORDER — AMOXICILLIN 250 MG
1 CAPSULE ORAL 2 TIMES DAILY
Status: DISCONTINUED | OUTPATIENT
Start: 2024-05-25 | End: 2024-05-27 | Stop reason: HOSPADM

## 2024-05-25 RX ORDER — CEFAZOLIN SODIUM/WATER 2 G/20 ML
2 SYRINGE (ML) INTRAVENOUS
Status: DISCONTINUED | OUTPATIENT
Start: 2024-05-25 | End: 2024-05-25 | Stop reason: HOSPADM

## 2024-05-25 RX ADMIN — PENICILLIN G 3 MILLION UNITS: 3000000 INJECTION, SOLUTION INTRAVENOUS at 02:59

## 2024-05-25 RX ADMIN — ONDANSETRON 4 MG: 2 INJECTION INTRAMUSCULAR; INTRAVENOUS at 03:01

## 2024-05-25 RX ADMIN — MIDAZOLAM 1 MG: 1 INJECTION INTRAMUSCULAR; INTRAVENOUS at 10:26

## 2024-05-25 RX ADMIN — SODIUM CHLORIDE, POTASSIUM CHLORIDE, SODIUM LACTATE AND CALCIUM CHLORIDE: 600; 310; 30; 20 INJECTION, SOLUTION INTRAVENOUS at 10:20

## 2024-05-25 RX ADMIN — TRANEXAMIC ACID 1 G: 10 INJECTION, SOLUTION INTRAVENOUS at 09:58

## 2024-05-25 RX ADMIN — PENICILLIN G 3 MILLION UNITS: 3000000 INJECTION, SOLUTION INTRAVENOUS at 07:13

## 2024-05-25 RX ADMIN — SODIUM CITRATE AND CITRIC ACID MONOHYDRATE 30 ML: 500; 334 SOLUTION ORAL at 10:04

## 2024-05-25 RX ADMIN — Medication 100 MCG: at 01:14

## 2024-05-25 RX ADMIN — LIDOCAINE HYDROCHLORIDE,EPINEPHRINE BITARTRATE 15 ML: 20; .005 INJECTION, SOLUTION EPIDURAL; INFILTRATION; INTRACAUDAL; PERINEURAL at 10:24

## 2024-05-25 RX ADMIN — Medication: at 08:29

## 2024-05-25 RX ADMIN — SODIUM CHLORIDE, POTASSIUM CHLORIDE, SODIUM LACTATE AND CALCIUM CHLORIDE: 600; 310; 30; 20 INJECTION, SOLUTION INTRAVENOUS at 08:34

## 2024-05-25 RX ADMIN — DEXAMETHASONE SODIUM PHOSPHATE 8 MG: 4 INJECTION, SOLUTION INTRA-ARTICULAR; INTRALESIONAL; INTRAMUSCULAR; INTRAVENOUS; SOFT TISSUE at 10:24

## 2024-05-25 RX ADMIN — METOCLOPRAMIDE HYDROCHLORIDE 10 MG: 5 INJECTION INTRAMUSCULAR; INTRAVENOUS at 07:10

## 2024-05-25 RX ADMIN — ACETAMINOPHEN 975 MG: 325 TABLET, FILM COATED ORAL at 21:43

## 2024-05-25 RX ADMIN — Medication 2 MILLI-UNITS/MIN: at 05:43

## 2024-05-25 RX ADMIN — MORPHINE SULFATE 2.5 MG: 1 INJECTION EPIDURAL; INTRATHECAL; INTRAVENOUS at 10:24

## 2024-05-25 RX ADMIN — ACETAMINOPHEN 975 MG: 325 TABLET, FILM COATED ORAL at 10:04

## 2024-05-25 RX ADMIN — OXYTOCIN-SODIUM CHLORIDE 0.9% IV SOLN 30 UNIT/500ML 10 ML/HR: 30-0.9/5 SOLUTION at 10:49

## 2024-05-25 RX ADMIN — ONDANSETRON 4 MG: 2 INJECTION INTRAMUSCULAR; INTRAVENOUS at 10:24

## 2024-05-25 RX ADMIN — MIDAZOLAM 1 MG: 1 INJECTION INTRAMUSCULAR; INTRAVENOUS at 10:24

## 2024-05-25 RX ADMIN — SENNOSIDES AND DOCUSATE SODIUM 2 TABLET: 50; 8.6 TABLET ORAL at 21:43

## 2024-05-25 RX ADMIN — ACETAMINOPHEN 975 MG: 325 TABLET, FILM COATED ORAL at 16:02

## 2024-05-25 RX ADMIN — CEFAZOLIN 2 G: 1 INJECTION, POWDER, FOR SOLUTION INTRAMUSCULAR; INTRAVENOUS at 10:30

## 2024-05-25 RX ADMIN — KETOROLAC TROMETHAMINE 30 MG: 30 INJECTION, SOLUTION INTRAMUSCULAR at 17:28

## 2024-05-25 RX ADMIN — KETOROLAC TROMETHAMINE 30 MG: 30 INJECTION, SOLUTION INTRAMUSCULAR at 11:11

## 2024-05-25 RX ADMIN — Medication 100 ML/HR: at 12:21

## 2024-05-25 RX ADMIN — KETOROLAC TROMETHAMINE 30 MG: 30 INJECTION, SOLUTION INTRAMUSCULAR at 23:11

## 2024-05-25 RX ADMIN — SODIUM CHLORIDE, SODIUM LACTATE, POTASSIUM CHLORIDE, CALCIUM CHLORIDE AND DEXTROSE MONOHYDRATE: 5; 600; 310; 30; 20 INJECTION, SOLUTION INTRAVENOUS at 14:56

## 2024-05-25 RX ADMIN — Medication 0.4 MCG/KG/MIN: at 10:30

## 2024-05-25 ASSESSMENT — ACTIVITIES OF DAILY LIVING (ADL)
ADLS_ACUITY_SCORE: 18
ADLS_ACUITY_SCORE: 25
ADLS_ACUITY_SCORE: 18
ADLS_ACUITY_SCORE: 25
ADLS_ACUITY_SCORE: 18
ADLS_ACUITY_SCORE: 25
ADLS_ACUITY_SCORE: 18

## 2024-05-25 NOTE — ANESTHESIA PREPROCEDURE EVALUATION
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, 19    see problem list    History of colposcopy 2011, 2013, 2016, 2019, 20    see problem list    History of human papillomavirus infection     Narrow angle glaucoma suspect of both eyes     Papanicolaou smear of cervix with low grade squamous intraepithelial lesion (LGSIL) 11,12    Thyroid disease     Varicella       Past Surgical History:   Procedure Laterality Date    AS TMJ ARTHROSCOPY/SURGERY Right 2012    hx of R TM tubes and surgery for eustacian tube    BIOPSY  2021    Colposcopy    COLPOSCOPY,BX CERVIX/ENDOCERV CURR      x 2    COLPOSCOPY,LOOP ELECTRD CERVIX EXCIS  2020    DIPIKA 2 - Path showed only DIPIKA 1 on ectoCx LEEP, neg margins    ENT SURGERY  2021      No Known Allergies   Social History     Tobacco Use    Smoking status: Never    Smokeless tobacco: Never   Substance Use Topics    Alcohol use: Not Currently     Comment: once a month      Wt Readings from Last 1 Encounters:   24 94.8 kg (209 lb)        Anesthesia Evaluation        No history of anesthetic complications       ROS/MED HX  ENT/Pulmonary:  - neg pulmonary ROS     Neurologic:  - neg neurologic ROS     Cardiovascular:  - neg cardiovascular ROS     METS/Exercise Tolerance:     Hematologic:  - neg hematologic  ROS     Musculoskeletal:       GI/Hepatic:  - neg GI/hepatic ROS     Renal/Genitourinary:       Endo:     (+)          thyroid problem,            Psychiatric/Substance Use:  - neg psychiatric ROS     Infectious Disease:       Malignancy:       Other:     (-) previous  and TOLAC candidate       Physical Exam    Airway        Mallampati: II   TM distance: > 3 FB   Neck ROM: full   Mouth opening: > 3 cm    Respiratory Devices and Support     "     Dental  no notable dental history         Cardiovascular   cardiovascular exam normal          Pulmonary   pulmonary exam normal                OUTSIDE LABS:  CBC:   Lab Results   Component Value Date    WBC 11.3 (H) 03/19/2024    WBC 13.6 (H) 03/06/2024    HGB 10.8 (L) 05/23/2024    HGB 10.7 (L) 03/19/2024    HCT 32.9 (L) 03/19/2024    HCT 32.0 (L) 03/06/2024     03/19/2024     03/06/2024     BMP:   Lab Results   Component Value Date     11/22/2017     09/16/2016    POTASSIUM 4.2 11/22/2017    POTASSIUM 4.3 09/16/2016    CHLORIDE 106 11/22/2017    CHLORIDE 106 09/16/2016    CO2 20 11/22/2017    CO2 23 09/16/2016    BUN 8 11/22/2017    BUN 6 (L) 09/16/2016    CR 0.61 03/06/2024    CR 0.72 11/22/2017    GLC 81 03/21/2022    GLC 78 12/13/2019     COAGS: No results found for: \"PTT\", \"INR\", \"FIBR\"  POC:   Lab Results   Component Value Date    HCG Negative 07/15/2021     HEPATIC:   Lab Results   Component Value Date    ALBUMIN 4.1 09/16/2016    PROTTOTAL 7.1 09/16/2016    ALT 11 03/06/2024    AST 12 03/06/2024    ALKPHOS 64 09/16/2016    BILITOTAL 0.6 09/16/2016     OTHER:   Lab Results   Component Value Date    DRE 9.3 11/22/2017    TSH 1.75 03/19/2024       Anesthesia Plan    ASA Status:  2       Anesthesia Type: Epidural.              Consents    Anesthesia Plan(s) and associated risks, benefits, and realistic alternatives discussed. Questions answered and patient/representative(s) expressed understanding.     - Discussed:     - Discussed with:  Patient            Postoperative Care            Comments:           neg OB ROS.      Winston Lara MD    I have reviewed the pertinent notes and labs in the chart from the past 30 days and (re)examined the patient.  Any updates or changes from those notes are reflected in this note.             "

## 2024-05-25 NOTE — PROVIDER NOTIFICATION
05/25/24 0953   Provider Notification   Provider Name/Title Dr. NOLVIA Mcneil   Method of Notification In Department     Order received to give TXA pre-operatively.  Updated primary RN Ana Luisa Rojas.

## 2024-05-25 NOTE — PLAN OF CARE
Goal Outcome Evaluation:      Plan of Care Reviewed With: patient    Overall Patient Progress: improvingOverall Patient Progress: improving    Outcome Evaluation: Transferred to postartum floor around 1345 today, comfortable, legs still a little numb, family here to visit. Patient's mother has the other baby band. Patient has scheduled Tylenol and Toradol.    Data: Vital signs within normal limits. Postpartum checks within normal limits - see flow record. Some shadowing noted on island dressing that covers incision. Fundus is firm. IV Pitocin switched to D5LR @ 125 ml/hr. Patient started to eat some crackers and is keeping that down, no nausea noted, and drinking normally. Will assess urine output in soares-  No apparent signs of infection. Patient able to elevate head of bed up to breastfeed without any nausea or dizziness. Breastfeeding on the postpartum side went well.   Action: Patient medicated during the shift for pain and cramping. See MAR. Patient reassessed within 1 hour after each medication and pain was improved - patient stated she was comfortable. Patient education done about scheduled medication, room orientation, crib safety, and feeding times for baby. See flow record.  Response: Positive attachment behaviors observed with infant, patient's mom has the other baby band. A friend of the patient is going to be spending the night this first night to help.   Plan: Anticipate discharge on 5/28/24.     Problem: Adult Inpatient Plan of Care  Goal: Plan of Care Review  Description: The Plan of Care Review/Shift note should be completed every shift.  The Outcome Evaluation is a brief statement about your assessment that the patient is improving, declining, or no change.  This information will be displayed automatically on your shift  note.  Outcome: Progressing  Flowsheets (Taken 5/25/2024 1712)  Outcome Evaluation: Transferred to postartum floor around 1345 today, comfortable, legs still a little numb, family  "here to visit. Patient's mother has the other baby band. Patient has scheduled Tylenol and Toradol.  Plan of Care Reviewed With: patient  Overall Patient Progress: improving  Goal: Patient-Specific Goal (Individualized)  Description: You can add care plan individualizations to a care plan. Examples of Individualization might be:  \"Parent requests to be called daily at 9am for status\", \"I have a hard time hearing out of my right ear\", or \"Do not touch me to wake me up as it startles  me\".  Outcome: Progressing  Flowsheets (Taken 5/25/2024 1712)  Individualized Care Needs: Please keep patient informed of all procedures and tests.  Goal: Absence of Hospital-Acquired Illness or Injury  Outcome: Progressing  Intervention: Prevent Skin Injury  Recent Flowsheet Documentation  Taken 5/25/2024 1602 by Taty Pressley RN  Body Position: position changed independently  Taken 5/25/2024 1445 by Taty Pressley RN  Body Position: position changed independently  Taken 5/25/2024 1410 by Taty Pressley RN  Body Position: position changed independently  Intervention: Prevent Infection  Recent Flowsheet Documentation  Taken 5/25/2024 1410 by Taty Pressley RN  Infection Prevention:   hand hygiene promoted   single patient room provided   rest/sleep promoted  Goal: Optimal Comfort and Wellbeing  Outcome: Progressing  Intervention: Monitor Pain and Promote Comfort  Recent Flowsheet Documentation  Taken 5/25/2024 1602 by Taty Pressley RN  Pain Management Interventions: medication (see MAR)  Intervention: Provide Person-Centered Care  Recent Flowsheet Documentation  Taken 5/25/2024 1230 by Taty Pressley RN  Trust Relationship/Rapport:   care explained   choices provided   emotional support provided   empathic listening provided   questions answered   questions encouraged   reassurance provided   thoughts/feelings acknowledged  Goal: Readiness for Transition of " Care  Outcome: Progressing     Problem: Postpartum ( Delivery)  Goal: Successful Parent Role Transition  Outcome: Progressing  Intervention: Support Parent Role Transition  Recent Flowsheet Documentation  Taken 2024 1230 by Taty Pressley RN  Supportive Measures:   active listening utilized   decision-making supported   positive reinforcement provided   relaxation techniques promoted   self-care encouraged   self-reflection promoted   self-responsibility promoted   verbalization of feelings encouraged  Parent-Child Attachment Promotion:   caring behavior modeled   cue recognition promoted   face-to-face positioning promoted   interaction encouraged   parent/caregiver presence encouraged   participation in care promoted   positive reinforcement provided   rooming-in promoted   skin-to-skin contact encouraged   strengths emphasized  Goal: Hemostasis  Outcome: Progressing  Goal: Effective Bowel Elimination  Outcome: Progressing  Intervention: Enhance Bowel Motility and Elimination  Recent Flowsheet Documentation  Taken 2024 1230 by Taty Pressley RN  Bowel Motility Enhancement: fluid intake encouraged  Goal: Fluid and Electrolyte Balance  Outcome: Progressing  Goal: Absence of Infection Signs and Symptoms  Outcome: Progressing  Goal: Anesthesia/Sedation Recovery  Outcome: Progressing  Goal: Optimal Pain Control and Function  Outcome: Progressing  Intervention: Prevent or Manage Pain  Recent Flowsheet Documentation  Taken 2024 1602 by Taty rPessley, RN  Pain Management Interventions: medication (see MAR)  Perineal Care: cold pack/ice pack applied  Goal: Nausea and Vomiting Relief  Outcome: Progressing  Intervention: Prevent or Manage Nausea and Vomiting  Recent Flowsheet Documentation  Taken 2024 1230 by Taty Pressley, RN  Nausea/Vomiting Interventions:   nausea triggers minimized   sips of clear liquids given   stimuli minimized  Goal:  Effective Urinary Elimination  Outcome: Progressing  Goal: Effective Oxygenation and Ventilation  Outcome: Progressing  Intervention: Optimize Oxygenation and Ventilation  Recent Flowsheet Documentation  Taken 5/25/2024 1602 by Taty Pressley RN  Head of Bed ([facility]) Positioning: HOB at 45 degrees  Taken 5/25/2024 1445 by Taty Pressley, RN  Head of Bed ([facility]) Positioning: HOB at 45 degrees  Taken 5/25/2024 1410 by Taty Pressley, RN  Head of Bed ([facility]) Positioning: HOB at 45 degrees  Taken 5/25/2024 1230 by Taty Pressley, RN  Airway/Ventilation Management: airway patency maintained

## 2024-05-25 NOTE — PROVIDER NOTIFICATION
05/25/24 0932   Provider Notification   Provider Name/Title Dr. MAXIMILIANO Mcneil   Method of Notification At Bedside   Notification Reason   (reviewed strip , noted lates/variables, currenlty mod variability int variables, dr checked sve in last hr+ little to no change in station, pitocin remains off at this time.  discussed with pt the options. pt stated does not have energy to keep pushing)     Discussing going forward with c/s.

## 2024-05-25 NOTE — ANESTHESIA CARE TRANSFER NOTE
Patient: Connie Bhakta    Procedure: Procedure(s):   section       Diagnosis: 39 weeks gestation of pregnancy [Z3A.39]  Diagnosis Additional Information: No value filed.    Anesthesia Type:   Epidural     Note:    Oropharynx: spontaneously breathing  Level of Consciousness: awake  Oxygen Supplementation: room air    Independent Airway: airway patency satisfactory and stable  Dentition: dentition unchanged  Vital Signs Stable: post-procedure vital signs reviewed and stable  Report to RN Given: handoff report given  Patient transferred to: Labor and Delivery  Comments: To L/D, report to RN.        Vitals:  Vitals Value Taken Time   BP     Temp     Pulse     Resp     SpO2         Electronically Signed By: NORBERTO Paul CRNA  May 25, 2024  11:29 AM

## 2024-05-25 NOTE — PROVIDER NOTIFICATION
05/25/24 0830   Provider Notification   Provider Name/Title Dr. Mcneil   Method of Notification Phone   Request Evaluate in Person   Notification Reason Decels  (reviewed strip, updated of recurrent late decels, now recurrent deeper variables, consistent postition changes from R lateral to L lateral, FSE was applied during deep variable, and pit was turned off.)     Writer also updated of baby coming down deeper into pelvis station is +1-+2, fht's baseline is trending upward more tackycardic, materal HR  now tackycardic, and maternal temp is trending upward last temp 99.0.  Dr stated leave pitocin off for 30min to see how fhts improve then can re-start at half dose.

## 2024-05-25 NOTE — PROVIDER NOTIFICATION
"   05/25/24 0647   Provider Notification   Provider Name/Title Dr Carpio   Method of Notification Electronic Page     Vocera messaging \"started pushing at 0533 at -1 station. good maternal efforts but fetal station remains at -1. pitocin at 6mu \"  "

## 2024-05-25 NOTE — PROVIDER NOTIFICATION
05/24/24 1908   Provider Notification   Provider Name/Title Dr. Carpio   Method of Notification Phone   Notification Reason Status Update  (writer sara 1/80/-1 posterior, urena=7, dr stated to do cervidil at this time.)        1 year

## 2024-05-25 NOTE — ANESTHESIA POSTPROCEDURE EVALUATION
Patient: Connie Bhakta    Procedure: Procedure(s):   section       Anesthesia Type:  Epidural    Note:  Disposition: Inpatient   Postop Pain Control: Uneventful            Sign Out: Well controlled pain   PONV: No   Neuro/Psych: Uneventful            Sign Out: Acceptable/Baseline neuro status   Airway/Respiratory: Uneventful            Sign Out: Acceptable/Baseline resp. status   CV/Hemodynamics: Uneventful            Sign Out: Acceptable CV status; No obvious hypovolemia; No obvious fluid overload   Other NRE: NONE   DID A NON-ROUTINE EVENT OCCUR? No     Epidural-to- Updated ASA: 2 Emergent      Last vitals:  Vitals Value Taken Time   BP     Temp 98.2  F (36.8  C) 24 1128   Pulse     Resp 16 24 1128   SpO2         Electronically Signed By: Thomas Hickman MD  May 25, 2024  11:37 AM

## 2024-05-25 NOTE — ANESTHESIA PROCEDURE NOTES
"Epidural catheter Procedure Note    Pre-Procedure   Staff -        Anesthesiologist:  Winston Lara MD       Performed By: anesthesiologist and resident       Referred By: Balaji       Location: OB       Pre-Anesthestic Checklist: patient identified, IV checked, risks and benefits discussed, informed consent, monitors and equipment checked, pre-op evaluation, at physician/surgeon's request and post-op pain management  Timeout:       Correct Patient: Yes        Correct Procedure: Yes        Correct Site: Yes        Correct Position: Yes   Procedure Documentation  Procedure: epidural catheter       Patient Position: sitting       Patient Prep/Sterile Barriers: sterile gloves, mask, patient draped       Skin prep: Betadine       Local skin infiltrated with mL of 1% lidocaine.        Insertion Site: L3-4. (midline approach).       Technique: LORT saline        ANT at 7 cm.       Needle Type: Unafinancey needle       Needle Gauge: 17.        Needle Length (Inches): 3.5        Catheter: 19 G.          Catheter threaded easily.         7 cm epidural space.         Threaded 14 cm at skin.         # of attempts: 1 and  # of redirects:  1    Assessment/Narrative         Paresthesias: No.       Test dose of 3 mL lidocaine 1.5% w/ 1:200,000 epinephrine at 23:39 CDT.         Test dose negative, 3 minutes after injection, for signs of intravascular, subdural, or intrathecal injection.       Insertion/Infusion Method: LORT saline       Aspiration negative for Heme or CSF via Epidural Catheter.    Medication(s) Administered   0.25% Bupivacaine PF (Epidural) - EPIDURAL   10 mL - 5/24/2024 11:42:00 PM    FOR [facility] ([facility]) ONLY:   Pain Team Contact information: please page the Pain Team Via EasySize. Search \"Pain\". During daytime hours, please page the attending first. At night please page the resident first.      "

## 2024-05-25 NOTE — L&D DELIVERY NOTE
OB  Delivery Note      Connie Bhakta MRN# [id number]   Age: 33 year old YOB: 1990     Connie Bhakta is a 33 year old  at 39w1d who was admitted last night for IOL in the setting of LGA fetus and 2VC. She has received 6 doses of PO misoprostol followed by pitocin.  She came to complete and pushed for 4+ hours with little progression beyond a + 1 stage    GA: 39w2d  GP:   Labor Complications: Failure to Progress in Second Stage   EBL:   mL  Delivery QBL:    Delivery Type: , Low Transverse   ROM to Delivery Time: (Delivered) Hours: 13 Minutes: 17     1 Minute 5 Minute 10 Minute   Apgar Totals: 7   9        Personel Present: SANJIV SCALES      Details    Pre-Op Diagnosis: 1. Intrauterine pregnancy at 39w2d  2. Arrest of descent   Post-Op Diagnosis: 1. same   Indications:  Failed induction;Arrest of descent    Procedure:   , Low Transverse  via   incision   Anesthesia: Epidural      Informed Consent:  The risks, benefits, complications, and alternatives were discussed with the patient. The patient understood that the risks of  section include, but are not limited to: injury to nearby structures or organs, infection, blood loss and possible need for transfusion, and potential need for more surgery including hysterectomy. The patient stated understanding and desired to proceed. All questions were answered. The site of surgery was properly noted and marked. The patient was identified as Connie Bhakta and the procedure verified as a  delivery. A Time Out was held and the above information confirmed.    Procedure Details:  The patient was taken to the operating room where Epidural  anesthesia was found to be adequate. Antibiotics were given for infection prophylaxis. She was prepped and draped in the normal sterile fashion in the dorsal supine position with leftward tilt. A Pfannenstiel skin incision was made with scalpel. The  incision was carried down to the fascia with bovie cauterization. The fascia was incised and extended laterally with Portillo scissors. The inferior aspect of the fascia was grasped with Kocher clamps. The underlying rectus and pyramidalis muscles were dissected off with Portillo scissors. In a similar fashion, the superior aspect of the fascia was elevated with Kocher clamps, and the rectus muscle was dissected off. The rectus muscles were  bluntly down the midline down to the level of the pubic symphysis. The peritoneum was identified and entered bluntly. Peritoneal incision was extended superiorly and inferiorly with good visualization of the bladder.    The bladder blade was inserted, vesicouterine peritoneum was identified, and bladder flap created bluntly. The lower uterine segment was then incised with a   incision and was extended bluntly. The amniotic sac was ruptured and Clear  color noted. The bladder blade was removed and the infant was delivered atraumatically using the usual maneuvers. The remainder of the infant was delivered, the cord clamped and cut, and the baby was handed off to the awaiting clinicians.     The placenta was removed via manual massage. The uterus was then exteriorized and cleared of all clots and debris. The hysterotomy was repaired with suture of 0 Vicryl in a running locked fashion. A second imbricating layer of the same suture was placed and good hemostasis observed. The ovaries and tubes appeared normal bilaterally. The uterus, tubes, and ovaries were then returned to the abdomen and pelvis. The uterine incision was reinspected and found to be hemostatic. The subfascial spaces were inspected and noted to be hemostatic. Anterior peritoneum repaired with 4-) vicryl. The fascia was then reapproximated with two running sutures of 0 Vicryl tied at the midline. The skin was closed with Ensorb staples.    The patient tolerated the procedure well. Sponge, instrument, and needle counts  were correct and the patient was taken to the recovery room in good and stable condition.       Brittney Bhakta-Connie [[id number]]      Labor Event Times      Latent labor onset date/time: 2024    Active labor onset date: 24 Onset time: 12:13 AM   Dilation complete date: 24 Complete time:  4:55 AM   Start pushing date/time: 2024 0533          Labor Length      1st Stage (hrs): 4 (min): 42   2nd Stage (hrs): 5 (min): 53   3rd Stage (hrs): 0 (min): 1          Labor Events     labor?: No   steroids: None  Labor Type: Induction/Cervical ripening  Predominate monitoring during 1st stage: continuous electronic fetal monitoring       Rupture date/time: 24   Rupture type: Spontaneous Rupture of Membranes  Fluid color: Clear  Fluid odor: Normal       Induction date/time:      Cervical ripening date/time: 24         Augmentation: Oxytocin       Delivery/Placenta Date and Time      Delivery Date: 24 Delivery Time: 10:48 AM   Placenta Date/Time: 2024 10:50 AM  Delivering clinician: Deejay Mcneil MD   Other personnel present at delivery:  Provider Role   Sanjiv Rojas RN              Vaginal Counts              Needles Suture Needles Sponges (RETIRED) Instruments   Initial counts       Added to count       Relief counts       Final counts               Placed during labor Accounted for at the end of labor   FSE Yes Yes   IUPC     Cervidil                               Apgars    Living status: Living   1 Minute 5 Minute 10 Minute 15 Minute 20 Minute   Skin color: 0  1       Heart rate: 2  2       Reflex irritability: 2  2       Muscle tone: 2  2       Respiratory effort: 1  2       Total: 7  9       Apgars assigned by: SANJIV VU       Cord      Vessels: 2 Vessels    Cord Complications: Nuchal   Nuchal Intervention: delivered through         Nuchal cord description: tight nuchal cord         Cord Blood Disposition: Lab    Gases Sent?: No   "  Delayed cord clamping?: Yes    Cord Clamping Delay (seconds): 31-60 seconds    Stem cell collection?: No           [place] Measurements      Weight: 8 lb 5.7 oz Length: 1' 9.75\"     Head circumference: 35.5 cm           Skin to Skin and Feeding Plan      Skin to skin initiation date/time:       Skin to skin end date/time:     Reason skin to skin not initiated: Patient Refused       Labor Events and Shoulder Dystocia    Fetal Tracing Prior to Delivery: Category 1, Category 2       Delivery (Maternal) (Provider to Complete) ([id number])           Blood Loss  Mother: Connie Bhakta #[id number]     Start of Mother's Information      Delivery Blood Loss  24 0013 - 24 1130      Total Surgical QBL Blood Loss (mL) Hospital Encounter 389 mL    Total  389 mL               End of Mother's Information  Mother: Connie Bhakta #[id number]                Delivery - Provider to Complete ([id number])    Delivering clinician: Deejay Mcneil MD  Delivery Type (Choose the 1 that will go to the Birth History): , Low Transverse                          Priority: Urgent      Specifics: Primary nulliparous     Indications for : Failed induction, Arrest of descent     Other personnel:  Provider Role   Ana Luisa Rojas RN                     Placenta    Date/Time: 2024 10:50 AM  Removal: Spontaneous  Disposition: Hospital disposal             Anesthesia    Method: Epidural                    Presentation and Position    Presentation: Vertex                    Deejay Mcneil MD  "

## 2024-05-25 NOTE — PLAN OF CARE
Data: Connie Bhakta transferred to [id number] via cart at 1302. Baby transferred via parent's arms.  Action: Receiving unit notified of transfer: Yes. Patient and family notified of room change. Report given to Jessy at 1313. Belongings sent to receiving unit. Accompanied by Registered Nurse. Oriented patient to surroundings. Call light within reach. ID bands double-checked with receiving RN.  Patients mobililty level scored using the bedside mobility assistance tool (BMAT). Patient is at a mobility level test number: 2. Mobility equipment used: slider board. Required assist of 3 staff members. Further use of BMAT scoring required.  Response: Patient tolerated transfer and is stable.  Problem: Adult Inpatient Plan of Care  Goal: Plan of Care Review    Outcome: Progressing  Flowsheets (Taken 2024 1312)  Plan of Care Reviewed With: patient  Overall Patient Progress: improving  Goal: Patient-Specific Goal (Individualized)    Outcome: Progressing  Goal: Absence of Hospital-Acquired Illness or Injury  Outcome: Progressing  Intervention: Prevent and Manage VTE (Venous Thromboembolism) Risk  Recent Flowsheet Documentation  Taken 2024 1230 by Ana Luisa Rojas, RN  VTE Prevention/Management: SCDs (sequential compression devices) on  Taken 2024 1125 by Ana Luisa Rojas, RN  VTE Prevention/Management: SCDs (sequential compression devices) on  Goal: Optimal Comfort and Wellbeing  Outcome: Progressing  Goal: Readiness for Transition of Care  Outcome: Progressing     Problem: Postpartum ( Delivery)  Goal: Successful Parent Role Transition  Outcome: Progressing  Goal: Hemostasis  Outcome: Progressing  Goal: Effective Bowel Elimination  Outcome: Progressing  Goal: Fluid and Electrolyte Balance  Outcome: Progressing  Goal: Absence of Infection Signs and Symptoms  Outcome: Progressing  Goal: Anesthesia/Sedation Recovery  Outcome: Progressing  Goal: Optimal Pain Control and Function  Outcome: Progressing  Goal: Nausea  and Vomiting Relief  Outcome: Progressing  Goal: Effective Urinary Elimination  Outcome: Progressing  Goal: Effective Oxygenation and Ventilation  Outcome: Progressing   Goal Outcome Evaluation:      Plan of Care Reviewed With: patient    Overall Patient Progress: improvingOverall Patient Progress: improving

## 2024-05-25 NOTE — PROVIDER NOTIFICATION
"   05/25/24 0739   Provider Notification   Provider Name/Title Dr. MAXIMILIANO Mcneil   Method of Notification At Bedside   Notification Reason   (revfiewed strip, status update, pt appears to be pushing effectively, writer stated station was -1 when checked at beginging of shift. Pt stated \"i am getting exhausted.\"  POC continue to push. Dr will check back in shortly.)     Pit @8mu/hr, pt afebrile, tx x3 for GBS w/abx PCN.  "

## 2024-05-25 NOTE — OP NOTE
OB  procedure Note      Connie Bhakta MRN# [id number]   Age: 33 year old YOB: 1990     Connie Bhakta is a 33 year old  at 39w1d who was admitted last night for IOL in the setting of LGA fetus and 2VC. She has received 6 doses of PO misoprostol followed by pitocin.  She came to complete and pushed for 4+ hours with little progression beyond a + 1 stage    GA: 39w2d  GP:   Labor Complications: Failure to Progress in Second Stage   EBL:   mL  Delivery QBL:    Delivery Type: , Low Transverse   ROM to Delivery Time: (Delivered) Hours: 13 Minutes: 17     1 Minute 5 Minute 10 Minute   Apgar Totals: 7   9        Personel Present: SANJIV SCALES      Details    Pre-Op Diagnosis: 1. Intrauterine pregnancy at 39w2d  2. Arrest of descent   Post-Op Diagnosis: 1. same   Indications:  Failed induction;Arrest of descent    Procedure:   , Low Transverse  via   incision   Anesthesia: Epidural      Informed Consent:  The risks, benefits, complications, and alternatives were discussed with the patient. The patient understood that the risks of  section include, but are not limited to: injury to nearby structures or organs, infection, blood loss and possible need for transfusion, and potential need for more surgery including hysterectomy. The patient stated understanding and desired to proceed. All questions were answered. The site of surgery was properly noted and marked. The patient was identified as Connie Bhakta and the procedure verified as a  delivery. A Time Out was held and the above information confirmed.    Procedure Details:  The patient was taken to the operating room where Epidural  anesthesia was found to be adequate. Antibiotics were given for infection prophylaxis. She was prepped and draped in the normal sterile fashion in the dorsal supine position with leftward tilt. A Pfannenstiel skin incision was made with scalpel. The  incision was carried down to the fascia with bovie cauterization. The fascia was incised and extended laterally with Portillo scissors. The inferior aspect of the fascia was grasped with Kocher clamps. The underlying rectus and pyramidalis muscles were dissected off with Portillo scissors. In a similar fashion, the superior aspect of the fascia was elevated with Kocher clamps, and the rectus muscle was dissected off. The rectus muscles were  bluntly down the midline down to the level of the pubic symphysis. The peritoneum was identified and entered bluntly. Peritoneal incision was extended superiorly and inferiorly with good visualization of the bladder.    The bladder blade was inserted, vesicouterine peritoneum was identified, and bladder flap created bluntly. The lower uterine segment was then incised with a   incision and was extended bluntly. The amniotic sac was ruptured and Clear  color noted. The bladder blade was removed and the infant was delivered atraumatically using the usual maneuvers. The remainder of the infant was delivered, the cord clamped and cut, and the baby was handed off to the awaiting clinicians.     The placenta was removed via manual massage. The uterus was then exteriorized and cleared of all clots and debris. The hysterotomy was repaired with suture of 0 Vicryl in a running locked fashion. A second imbricating layer of the same suture was placed and good hemostasis observed. The ovaries and tubes appeared normal bilaterally. The uterus, tubes, and ovaries were then returned to the abdomen and pelvis. The uterine incision was reinspected and found to be hemostatic. The subfascial spaces were inspected and noted to be hemostatic. Anterior peritoneum repaired with 4-) vicryl. The fascia was then reapproximated with two running sutures of 0 Vicryl tied at the midline. The skin was closed with Ensorb staples.    The patient tolerated the procedure well. Sponge, instrument, and needle counts  were correct and the patient was taken to the recovery room in good and stable condition.

## 2024-05-25 NOTE — PROGRESS NOTES
[facility]  Labor Progress Note    O:   Patient Vitals for the past 4 hrs:   BP Temp Temp src SpO2   24 0013 126/51 -- -- 99 %   24 0000 98/57 97.6  F (36.4  C) Oral 100 %   24 2354 98/53 -- -- 100 %   24 2350 103/72 -- -- 100 %   24 2345 112/68 -- -- --   24 -- -- -- 100 %   24 2335 -- -- -- 99 %   24 2330 -- -- -- 100 %   24 2300 -- 97.5  F (36.4  C) Oral --   24 -- 98.5  F (36.9  C) Oral --     SVE: 4/90/-2 at 0000 per RN exam    FHT  Baseline: 130  Variability: Moderate  Accels: Present  Decels: 2 minute decel after epidural placement. There were then 2 late appearing decels at 0023.  [place]: 3 in 10 minutes    A/P:  Connie Bhakta is a 33 year old  at 39w2d, here for IOL for 2VC and LGA.    Labor:   - Cervix: 1 cm > PO miso x 11 > Cervidil > SROM > 4 cm  - Membranes: SROM at 2131  - GBS pos; PCN started  - Pain management: Epidural in place   - Anticipate   Plan: Expectant management; pitocin PRN    #FWB:  - Fetal status: Overall Cat 1 however there have been a few decels since epidural placement. Earlier in the day there were intermittent decelerations. Otherwise reassuring with moderate variability and accels.  - Continuous EFM, [place].    - Intrauterine resuscitative measure PRN including maternal repositioning, fluid bolus, ephedrine, supplemental O2, IUPC, FSE, amnioinfusion    Salazar Carpio MD MPH  [facility]  2024 12:21 AM

## 2024-05-25 NOTE — BRIEF OP NOTE
[facility]    Brief Operative Note    Pre-operative diagnosis: 39 weeks gestation of pregnancy [Z3A.39]  Arrest of descent  Failed induction  Post-operative diagnosis Same as pre-operative diagnosis    Procedure:  section, N/A - Abdomen    Surgeon: Surgeons and Role:     * Deejay Mcneil MD - Primary  Anesthesia: Epidural with Block   Estimated Blood Loss: see report    Drains: None  Specimens:   ID Type Source Tests Collected by Time Destination   A :  Tissue Umbilical Cord SEE PROVIDERS ORDERS Deejay Mcneil MD 2024 10:54 AM      Findings:   None.  Complications: None.  Implants: * No implants in log *

## 2024-05-25 NOTE — PROVIDER NOTIFICATION
05/25/24 0026   Provider Notification   Provider Name/Title Dr Carpio   Method of Notification In Department     Updated MD of epidural received. SVE 4/95/-2. First dose of PCN administered. Fetal tracing with PD responded with repositioning s/p epidural. Pt BP runs lower and post epidural BP have been within 20% of baseline. Pt was straight cath x 1, orders to place soares per RN discretion. No pitocin administered given fetal tracing and cervical change.

## 2024-05-26 LAB
HGB BLD-MCNC: 9 G/DL (ref 11.7–15.7)
PLATELET # BLD AUTO: 253 10E3/UL (ref 150–450)

## 2024-05-26 PROCEDURE — 36415 COLL VENOUS BLD VENIPUNCTURE: CPT | Performed by: OBSTETRICS & GYNECOLOGY

## 2024-05-26 PROCEDURE — 250N000011 HC RX IP 250 OP 636: Performed by: STUDENT IN AN ORGANIZED HEALTH CARE EDUCATION/TRAINING PROGRAM

## 2024-05-26 PROCEDURE — 85049 AUTOMATED PLATELET COUNT: CPT | Performed by: OBSTETRICS & GYNECOLOGY

## 2024-05-26 PROCEDURE — 120N000013 HC R&B IMCU

## 2024-05-26 PROCEDURE — 85018 HEMOGLOBIN: CPT | Performed by: OBSTETRICS & GYNECOLOGY

## 2024-05-26 PROCEDURE — 250N000011 HC RX IP 250 OP 636: Performed by: OBSTETRICS & GYNECOLOGY

## 2024-05-26 PROCEDURE — 250N000013 HC RX MED GY IP 250 OP 250 PS 637: Performed by: OBSTETRICS & GYNECOLOGY

## 2024-05-26 RX ORDER — ENOXAPARIN SODIUM 100 MG/ML
40 INJECTION SUBCUTANEOUS EVERY 24 HOURS
Status: DISCONTINUED | OUTPATIENT
Start: 2024-05-26 | End: 2024-05-27 | Stop reason: HOSPADM

## 2024-05-26 RX ADMIN — IBUPROFEN 800 MG: 800 TABLET, FILM COATED ORAL at 16:50

## 2024-05-26 RX ADMIN — ACETAMINOPHEN 975 MG: 325 TABLET, FILM COATED ORAL at 04:45

## 2024-05-26 RX ADMIN — ACETAMINOPHEN 975 MG: 325 TABLET, FILM COATED ORAL at 10:30

## 2024-05-26 RX ADMIN — SENNOSIDES AND DOCUSATE SODIUM 2 TABLET: 50; 8.6 TABLET ORAL at 22:44

## 2024-05-26 RX ADMIN — ACETAMINOPHEN 975 MG: 325 TABLET, FILM COATED ORAL at 22:44

## 2024-05-26 RX ADMIN — SENNOSIDES AND DOCUSATE SODIUM 1 TABLET: 50; 8.6 TABLET ORAL at 10:30

## 2024-05-26 RX ADMIN — IBUPROFEN 800 MG: 800 TABLET, FILM COATED ORAL at 10:30

## 2024-05-26 RX ADMIN — ACETAMINOPHEN 975 MG: 325 TABLET, FILM COATED ORAL at 16:50

## 2024-05-26 RX ADMIN — KETOROLAC TROMETHAMINE 30 MG: 30 INJECTION, SOLUTION INTRAMUSCULAR at 04:45

## 2024-05-26 RX ADMIN — ENOXAPARIN SODIUM 40 MG: 40 INJECTION SUBCUTANEOUS at 10:30

## 2024-05-26 RX ADMIN — IBUPROFEN 800 MG: 800 TABLET, FILM COATED ORAL at 22:45

## 2024-05-26 ASSESSMENT — ACTIVITIES OF DAILY LIVING (ADL)
ADLS_ACUITY_SCORE: 18

## 2024-05-26 NOTE — PLAN OF CARE
VSS. Up ad john. Straight cathed x1. Now voiding without difficulty. Island dressing over incision. Drainage marked, no change this shift. Lochia scant, no clots. Fundus firm and midline. Pain well managed with Tylenol and Toradol. Breastfeeding, tolerating well. Friend supportive and at bedside. Bonding well with infant.    Goal Outcome Evaluation:      Plan of Care Reviewed With: patient    Overall Patient Progress: improvingOverall Patient Progress: improving    Problem: Adult Inpatient Plan of Care  Goal: Plan of Care Review  Description: The Plan of Care Review/Shift note should be completed every shift.  The Outcome Evaluation is a brief statement about your assessment that the patient is improving, declining, or no change.  This information will be displayed automatically on your shift  note.  Outcome: Progressing  Flowsheets (Taken 5/26/2024 0416)  Plan of Care Reviewed With: patient  Overall Patient Progress: improving  Goal: Absence of Hospital-Acquired Illness or Injury  Intervention: Prevent Skin Injury  Recent Flowsheet Documentation  Taken 5/26/2024 0229 by Charito Cortez RN  Body Position: position changed independently  Taken 5/25/2024 2145 by Charito Cortez RN  Body Position: position changed independently  Intervention: Prevent and Manage VTE (Venous Thromboembolism) Risk  Recent Flowsheet Documentation  Taken 5/26/2024 0229 by Charito Cortez RN  VTE Prevention/Management: SCDs (sequential compression devices) on  Taken 5/25/2024 2145 by Charito Cortez RN  VTE Prevention/Management: SCDs (sequential compression devices) off  Intervention: Prevent Infection  Recent Flowsheet Documentation  Taken 5/26/2024 0229 by Charito Cortez RN  Infection Prevention:   rest/sleep promoted   hand hygiene promoted   environmental surveillance performed  Taken 5/25/2024 2145 by Charito Cortez RN  Infection Prevention:   rest/sleep promoted   hand hygiene promoted    environmental surveillance performed  Goal: Optimal Comfort and Wellbeing  Intervention: Monitor Pain and Promote Comfort  Recent Flowsheet Documentation  Taken 2024 by Charito Cortez RN  Pain Management Interventions: medication (see MAR)  Taken 2024 by Charito Cortez RN  Pain Management Interventions: medication (see MAR)  Intervention: Provide Person-Centered Care  Recent Flowsheet Documentation  Taken 2024 by Charito Cortez RN  Trust Relationship/Rapport:   care explained   choices provided   questions answered   questions encouraged  Taken 2024 by Charito Cortez RN  Trust Relationship/Rapport:   care explained   choices provided   questions answered   questions encouraged     Problem: Labor  Goal: Stable Fetal Wellbeing  Intervention: Promote and Monitor Fetal Wellbeing  Recent Flowsheet Documentation  Taken 2024 by Charito Cortez RN  Body Position: position changed independently  Taken 2024 by Charito Cortez RN  Body Position: position changed independently  Goal: Absence of Infection Signs and Symptoms  Intervention: Prevent or Manage Infection  Recent Flowsheet Documentation  Taken 2024 by Charito Cortez RN  Infection Prevention:   rest/sleep promoted   hand hygiene promoted   environmental surveillance performed  Taken 2024 by Charito Cortez RN  Infection Prevention:   rest/sleep promoted   hand hygiene promoted   environmental surveillance performed     Problem:  Delivery  Goal: Stable Fetal Wellbeing  Intervention: Promote and Monitor Fetal Wellbeing  Recent Flowsheet Documentation  Taken 2024 by Charito Cortez RN  Body Position: position changed independently  Taken 2024 by Charito Cortez RN  Body Position: position changed independently  Goal: Absence of Infection Signs and Symptoms  Intervention: Prevent or Manage  Infection  Recent Flowsheet Documentation  Taken 2024 022 by Charito Cortez RN  Infection Prevention:   rest/sleep promoted   hand hygiene promoted   environmental surveillance performed  Taken 2024 by Charito Cortez RN  Infection Prevention:   rest/sleep promoted   hand hygiene promoted   environmental surveillance performed     Problem: Postpartum ( Delivery)  Goal: Successful Parent Role Transition  Intervention: Support Parent Role Transition  Recent Flowsheet Documentation  Taken 2024 by Charito Cortez RN  Supportive Measures:   active listening utilized   decision-making supported   goal-setting facilitated   positive reinforcement provided   problem-solving facilitated   relaxation techniques promoted   self-care encouraged  Parent-Child Attachment Promotion:   caring behavior modeled   cue recognition promoted   interaction encouraged   parent/caregiver presence encouraged   participation in care promoted   positive reinforcement provided   rooming-in promoted   skin-to-skin contact encouraged   strengths emphasized  Taken 2024 by Charito Cortez RN  Supportive Measures:   active listening utilized   decision-making supported   goal-setting facilitated   positive reinforcement provided   problem-solving facilitated   self-care encouraged  Parent-Child Attachment Promotion:   caring behavior modeled   cue recognition promoted   interaction encouraged   parent/caregiver presence encouraged   participation in care promoted   positive reinforcement provided   rooming-in promoted   skin-to-skin contact encouraged   strengths emphasized  Goal: Optimal Pain Control and Function  Intervention: Prevent or Manage Pain  Recent Flowsheet Documentation  Taken 2024 2311 by Charito Cortez RN  Pain Management Interventions: medication (see MAR)  Taken 2024 by Charito Cortez RN  Pain Management Interventions: medication  (see MAR)  Goal: Effective Urinary Elimination  Intervention: Monitor and Manage Urinary Retention  Recent Flowsheet Documentation  Taken 5/26/2024 0229 by Charito Cortez, RN  Urinary Elimination Promotion: frequent voiding encouraged  Taken 5/25/2024 2145 by Charito Cortez, RN  Urinary Elimination Promotion: frequent voiding encouraged  Goal: Effective Oxygenation and Ventilation  Intervention: Optimize Oxygenation and Ventilation  Recent Flowsheet Documentation  Taken 5/26/2024 0229 by Charito Cortez, RN  Head of Bed (HOB) Positioning: HOB at 30-45 degrees  Taken 5/25/2024 2145 by Charito Cortez, RN  Head of Bed (HOB) Positioning: HOB at 30-45 degrees

## 2024-05-26 NOTE — PLAN OF CARE
Goal Outcome Evaluation:      Plan of Care Reviewed With: patient    Overall Patient Progress: improvingOverall Patient Progress: improving    Outcome Evaluation: Patient independent with own cares. Hgb 9.0. Denies dizziness, lightheadedness, or shortness of breath. May shower today. May take off dressing today. Awaiting results on Rubella titer.    Data: Vital signs within normal limits. Postpartum checks within normal limits - see flow record. Patient eating and drinking normally. Patient able to empty bladder independently and is up ambulating in room and in the halls. No apparent signs of infection. Incision healing well. Patient performing self cares and is able to care for infant. Continuing to work on breastfeeding. Infant has not been sucking, or has been spitty or sleepy. Has been working with lactation today ( See lactation's note).   Action: Patient medicated during the shift for pain and cramping. See MAR. Patient reassessed within 1 hour after each medication and pain was improved - patient stated she was comfortable. Patient education done about breastfeeding tips and help, incisional care, pain medication times.  See flow record.  Response: Positive attachment behaviors observed with infant. Patient's friend spent the night to help, and was here throughout the day.   Plan: Anticipate discharge on 5/28/24       Problem: Adult Inpatient Plan of Care  Goal: Plan of Care Review  Description: The Plan of Care Review/Shift note should be completed every shift.  The Outcome Evaluation is a brief statement about your assessment that the patient is improving, declining, or no change.  This information will be displayed automatically on your shift  note.  Outcome: Progressing  Flowsheets (Taken 5/26/2024 1242)  Outcome Evaluation: Patient independent with own cares. Hgb 9.0. Denies dizziness, lightheadedness, or shortness of breath. May shower today. May take off dressing today. Awaiting results on Rubella  "titer.  Plan of Care Reviewed With: patient  Overall Patient Progress: improving  Goal: Patient-Specific Goal (Individualized)  Description: You can add care plan individualizations to a care plan. Examples of Individualization might be:  \"Parent requests to be called daily at 9am for status\", \"I have a hard time hearing out of my right ear\", or \"Do not touch me to wake me up as it startles  me\".  Outcome: Progressing  Goal: Absence of Hospital-Acquired Illness or Injury  Outcome: Progressing  Intervention: Prevent Skin Injury  Recent Flowsheet Documentation  Taken 2024 09 by Taty Pressley RN  Body Position: position changed independently  Intervention: Prevent and Manage VTE (Venous Thromboembolism) Risk  Recent Flowsheet Documentation  Taken 2024 09 by Taty Pressley RN  VTE Prevention/Management: SCDs (sequential compression devices) off  Intervention: Prevent Infection  Recent Flowsheet Documentation  Taken 2024 09 by Taty Pressley RN  Infection Prevention:   rest/sleep promoted   hand hygiene promoted   environmental surveillance performed  Goal: Optimal Comfort and Wellbeing  Outcome: Progressing  Intervention: Monitor Pain and Promote Comfort  Recent Flowsheet Documentation  Taken 2024 1030 by Taty Pressley RN  Pain Management Interventions: medication (see MAR)  Intervention: Provide Person-Centered Care  Recent Flowsheet Documentation  Taken 2024 09 by Taty Pressley RN  Trust Relationship/Rapport:   care explained   questions answered   questions encouraged   choices provided   emotional support provided   empathic listening provided   reassurance provided   thoughts/feelings acknowledged  Goal: Readiness for Transition of Care  Outcome: Progressing     Problem: Postpartum ( Delivery)  Goal: Successful Parent Role Transition  Outcome: Progressing  Intervention: Support Parent Role Transition  Recent " Flowsheet Documentation  Taken 5/26/2024 0900 by Taty Pressley RN  Supportive Measures:   active listening utilized   decision-making supported   goal-setting facilitated   positive reinforcement provided   problem-solving facilitated   relaxation techniques promoted   self-care encouraged   self-reflection promoted   verbalization of feelings encouraged   self-responsibility promoted  Parent-Child Attachment Promotion:   caring behavior modeled   cue recognition promoted   interaction encouraged   parent/caregiver presence encouraged   participation in care promoted   positive reinforcement provided   rooming-in promoted   skin-to-skin contact encouraged   strengths emphasized  Goal: Hemostasis  Outcome: Progressing  Goal: Effective Bowel Elimination  Outcome: Progressing  Intervention: Enhance Bowel Motility and Elimination  Recent Flowsheet Documentation  Taken 5/26/2024 0900 by Taty Pressley RN  Bowel Motility Enhancement:   ambulation promoted   fluid intake encouraged   oral intake encouraged  Bowel Elimination Promotion:   adequate fluid intake promoted   ambulation promoted  Goal: Fluid and Electrolyte Balance  Outcome: Progressing  Intervention: Monitor and Manage Fluid and Electrolyte Balance  Recent Flowsheet Documentation  Taken 5/26/2024 0900 by Taty Pressley RN  Fluid/Electrolyte Management: fluids provided  Goal: Absence of Infection Signs and Symptoms  Outcome: Progressing  Goal: Anesthesia/Sedation Recovery  Outcome: Progressing  Goal: Optimal Pain Control and Function  Outcome: Progressing  Intervention: Prevent or Manage Pain  Recent Flowsheet Documentation  Taken 5/26/2024 1030 by Taty Pressley RN  Pain Management Interventions: medication (see MAR)  Taken 5/26/2024 0900 by Taty Pressley RN  Perineal Care:   perineum cleansed   perineal hygiene encouraged   perineal spray bottle/warm water use encouraged  Goal: Nausea and Vomiting  Relief  Outcome: Progressing  Intervention: Prevent or Manage Nausea and Vomiting  Recent Flowsheet Documentation  Taken 5/26/2024 0900 by Taty Pressley, RN  Nausea/Vomiting Interventions: nausea triggers minimized  Goal: Effective Urinary Elimination  Outcome: Progressing  Intervention: Monitor and Manage Urinary Retention  Recent Flowsheet Documentation  Taken 5/26/2024 0900 by Taty Pressley, RN  Urinary Elimination Promotion: frequent voiding encouraged  Goal: Effective Oxygenation and Ventilation  Outcome: Progressing  Intervention: Optimize Oxygenation and Ventilation  Recent Flowsheet Documentation  Taken 5/26/2024 0900 by Taty Pressley, RN  Airway/Ventilation Management: airway patency maintained  Head of Bed (HOB) Positioning: HOB at 90 degrees

## 2024-05-26 NOTE — PROGRESS NOTES
[facility]   Post-partum Progress Note    Name:  Connie Bhakta  MRN: [id number]    S:   Patient reports feeling good.  Pain  controlled.  Tolerating regular diet without nausea or vomiting.  Ambulating without dizziness.  Voiding spontaneously. Has not passed flatus; has not had bowel movement. Lochia similar to menstrual flow.     O:   Patient Vitals for the past 24 hrs:   BP Temp Temp src Pulse Resp SpO2   05/26/24 0735 102/55 97.7  F (36.5  C) Oral 74 16 --   05/26/24 0229 101/46 97.6  F (36.4  C) Oral 76 16 --   05/25/24 2145 115/64 97.8  F (36.6  C) Oral 90 18 --   05/25/24 1800 118/70 -- -- -- -- 98 %   05/25/24 1700 116/74 98.3  F (36.8  C) Oral -- 18 98 %   05/25/24 1550 116/68 97.8  F (36.6  C) Oral -- 18 98 %   05/25/24 1445 120/52 98.2  F (36.8  C) Oral -- 18 98 %   05/25/24 1410 113/61 98.4  F (36.9  C) Oral -- 18 98 %   05/25/24 1300 112/56 -- -- -- -- --   05/25/24 1255 -- -- -- -- -- 98 %   05/25/24 1250 -- -- -- -- -- 99 %   05/25/24 1245 -- -- -- -- -- 98 %   05/25/24 1244 111/60 -- -- -- -- --   05/25/24 1240 -- -- -- -- -- 97 %   05/25/24 1235 -- -- -- -- -- 96 %   05/25/24 1230 -- -- -- -- -- 96 %   05/25/24 1229 111/58 -- -- -- -- --   05/25/24 1225 -- -- -- -- -- 95 %   05/25/24 1220 -- -- -- -- -- 94 %   05/25/24 1215 -- -- -- -- -- 94 %   05/25/24 1214 116/55 -- -- -- -- --   05/25/24 1210 -- -- -- -- -- 94 %   05/25/24 1205 -- -- -- -- -- 94 %   05/25/24 1200 -- -- -- -- -- 95 %   05/25/24 1156 (!) 84/52 -- -- -- -- --   05/25/24 1151 99/51 -- -- -- -- --   05/25/24 1146 93/52 -- -- -- -- --   05/25/24 1141 95/55 -- -- -- -- --   05/25/24 1131 98/50 -- -- -- -- --   05/25/24 1129 93/52 -- -- -- -- --   05/25/24 1128 -- 98.2  F (36.8  C) Oral -- 16 --   05/25/24 0953 -- 99.4  F (37.4  C) Oral -- -- --   05/25/24 0927 104/57 -- -- -- -- --   05/25/24 0924 -- -- -- -- -- 91 %   05/25/24 0919 -- -- -- -- -- 90 %   05/25/24 0914 -- -- -- -- -- 93 %   05/25/24 0912 98/53 -- -- --  -- --   24 0909 -- -- -- -- -- 91 %   24 0904 -- -- -- -- -- 91 %   24 0902 -- 99.3  F (37.4  C) Oral -- -- --   24 0859 -- -- -- -- -- 91 %   24 0858 103/53 -- -- -- -- --   24 0854 -- -- -- -- -- 93 %   24 0849 -- -- -- -- -- 94 %   24 0848 -- -- -- -- -- 94 %   24 0844 -- -- -- -- -- 93 %   24 0843 125/61 -- -- -- -- 94 %   24 0839 -- -- -- -- -- 92 %   24 0838 -- -- -- -- -- 93 %   24 0834 -- -- -- -- -- 95 %   24 0832 -- -- -- -- -- 93 %   24 0829 -- -- -- -- -- 94 %   24 0828 106/58 -- -- -- -- --   24 0824 -- -- -- -- -- 91 %   24 0819 -- -- -- -- -- (!) 89 %   24 0814 -- -- -- -- -- (!) 87 %   24 0813 111/66 -- -- -- -- --   24 0800 -- 99  F (37.2  C) Oral -- -- --   24 0757 110/59 -- -- -- -- --     Gen:  Resting comfortably, NAD  CV:  Well perfused  Pulm:  Non-labored breathing. No cough or audible wheezing.   Abd:  Soft, appropriately tender to palpation, non-distended.  Fundus below umbilicus, firm, and non-tender.  Inc:  Bandage in place, c/d/i with moderate overlying shadowing, no surrounding erythema or edema  Ext:  Non-tender, 1+ LE edema b/l    Hgb:   Recent Labs   Lab 24   HGB 10.8*     A/P:  33 year old  POD#1 s/p PLTCS for arrest of descent. Pregnancy notable for 2VC, borderline hypothyroidism. Postpartum course unremarkable. Continue with routine postpartum management.     #Routine Postpartum  Pain: Well-controlled with scheduled tylenol, toradol > ibuprofen, PRN oxy  Hgb: 10.8 >  > AM pending. VSS as noted above, asymptomatic. Will discharge with PO Fe if hgb < 10  GI:  Regular diet. Bowel regimen. Encourage hydration and ambulation.  : Voiding spontaneously  PPx:  Encouraged ambulation, Lovenox  Rh: Equivocal; re-collected today  Baby: In room, doing well  Feed: Breastfeeding  BC: Not discussed  Dispo: Plan for home on POD#2-3.    Salazar  MD Balaji MPH  [facility]  05/26/2024 7:51 AM

## 2024-05-26 NOTE — LACTATION NOTE
This note was copied from a baby's chart.  Lactation visit; Connie reporting infant has been spitty- but breast fed for 5 minutes this morning otherwise has had some difficulty. Writer in room to assist with breastfeed at 1200. Brought to left breast in football hold but refusing to open mouth.  Hand expression and STS encouraged- discussed potentially pumping with next feed if not latching. Education provided on expected feeding behaviors, benefits of STS/hand expression, and early feeding cues.     Writer back to room at 1400- infant spitty/gaggy- assisted with bulb syringe. After few minutes alert- brought to left breast and infant attempting to latch. Infant able to latch with wide mouth and flanged lips- took 1-2 sucks and refusing to suck anymore- breast compressions utilized. After few more attempts infant continues to refuse to suck. Suggested pumping and Connie agrees. Assisted with pump set up, reviewed settings as well as care/cleaning of parts.   Connie wanting to supplement with formula- reviewed DM option as well but prefers to use formula. Writer assisted with formula feed as infant refusing to suck on nipple or gloved finger. Few drops of formula finger fed and then infant started to take couple of sucks. Writer was able to finger feed 5 ml- infant with coordinated suck when given formula but otherwise does not continue suck once formula was gone. Pacifier given to practice suck pattern- reviewed risks and encouraged to use minimally. Primary RN updated.

## 2024-05-27 VITALS
HEART RATE: 89 BPM | BODY MASS INDEX: 35.44 KG/M2 | WEIGHT: 207.56 LBS | RESPIRATION RATE: 18 BRPM | SYSTOLIC BLOOD PRESSURE: 119 MMHG | DIASTOLIC BLOOD PRESSURE: 69 MMHG | HEIGHT: 64 IN | TEMPERATURE: 98.2 F | OXYGEN SATURATION: 98 %

## 2024-05-27 PROCEDURE — 250N000013 HC RX MED GY IP 250 OP 250 PS 637: Performed by: OBSTETRICS & GYNECOLOGY

## 2024-05-27 PROCEDURE — 90471 IMMUNIZATION ADMIN: CPT | Performed by: OBSTETRICS & GYNECOLOGY

## 2024-05-27 PROCEDURE — 250N000011 HC RX IP 250 OP 636: Performed by: OBSTETRICS & GYNECOLOGY

## 2024-05-27 PROCEDURE — 90707 MMR VACCINE SC: CPT | Performed by: OBSTETRICS & GYNECOLOGY

## 2024-05-27 PROCEDURE — 250N000011 HC RX IP 250 OP 636: Performed by: STUDENT IN AN ORGANIZED HEALTH CARE EDUCATION/TRAINING PROGRAM

## 2024-05-27 RX ORDER — IBUPROFEN 800 MG/1
800 TABLET, FILM COATED ORAL EVERY 8 HOURS PRN
Qty: 25 TABLET | Refills: 0 | Status: SHIPPED | OUTPATIENT
Start: 2024-05-27

## 2024-05-27 RX ORDER — HYDROCODONE BITARTRATE AND ACETAMINOPHEN 5; 325 MG/1; MG/1
1 TABLET ORAL EVERY 6 HOURS PRN
Qty: 15 TABLET | Refills: 0 | Status: SHIPPED | OUTPATIENT
Start: 2024-05-27 | End: 2024-05-30

## 2024-05-27 RX ADMIN — ENOXAPARIN SODIUM 40 MG: 40 INJECTION SUBCUTANEOUS at 10:17

## 2024-05-27 RX ADMIN — MEASLES, MUMPS, AND RUBELLA VIRUS VACCINE LIVE 0.5 ML: 1000; 12500; 1000 INJECTION, POWDER, LYOPHILIZED, FOR SUSPENSION SUBCUTANEOUS at 13:34

## 2024-05-27 RX ADMIN — ACETAMINOPHEN 975 MG: 325 TABLET, FILM COATED ORAL at 10:17

## 2024-05-27 RX ADMIN — IBUPROFEN 800 MG: 800 TABLET, FILM COATED ORAL at 10:17

## 2024-05-27 RX ADMIN — SENNOSIDES AND DOCUSATE SODIUM 2 TABLET: 50; 8.6 TABLET ORAL at 10:17

## 2024-05-27 RX ADMIN — ACETAMINOPHEN 975 MG: 325 TABLET, FILM COATED ORAL at 04:35

## 2024-05-27 RX ADMIN — IBUPROFEN 800 MG: 800 TABLET, FILM COATED ORAL at 04:35

## 2024-05-27 ASSESSMENT — ACTIVITIES OF DAILY LIVING (ADL)
ADLS_ACUITY_SCORE: 18

## 2024-05-27 NOTE — PLAN OF CARE
"Goal Outcome Evaluation:      Plan of Care Reviewed With: patient    Overall Patient Progress: improvingOverall Patient Progress: improving    Outcome Evaluation: Pain managed, voiding without difficulty, VSS, Breast and bottle feeding infant.  Patient meeting expected goals. Is up independent in room, meeting all personal and infant needs. VSS. Pain is being managed with Tylenol and Ibuprofen. Voiding with out difficulty. Incision to low abdomen is with steri strips, well approximated, with no drainage or signs of infection noted to surrounding tissue. Encouraged walking to assist with +3 edema to feet bi-laterally.  Breastfeeding is going well and bonding well with infant. Is also (at times) supplementing after with pumped EBM and/or formula. Lactation to see before discharge.       Problem: Adult Inpatient Plan of Care  Goal: Plan of Care Review  Description: The Plan of Care Review/Shift note should be completed every shift.  The Outcome Evaluation is a brief statement about your assessment that the patient is improving, declining, or no change.  This information will be displayed automatically on your shift  note.  Outcome: Progressing  Flowsheets (Taken 5/27/2024 1112)  Outcome Evaluation: Pain managed, voiding without difficulty, VSS, Breast and bottle feeding infant.  Plan of Care Reviewed With: patient  Overall Patient Progress: improving  Goal: Patient-Specific Goal (Individualized)  Description: You can add care plan individualizations to a care plan. Examples of Individualization might be:  \"Parent requests to be called daily at 9am for status\", \"I have a hard time hearing out of my right ear\", or \"Do not touch me to wake me up as it startles  me\".  Outcome: Progressing  Goal: Absence of Hospital-Acquired Illness or Injury  Outcome: Progressing  Intervention: Prevent Skin Injury  Recent Flowsheet Documentation  Taken 5/27/2024 1022 by Shreya Valerio RN  Body Position: position changed " independently  Intervention: Prevent and Manage VTE (Venous Thromboembolism) Risk  Recent Flowsheet Documentation  Taken 2024 1000 by Shreya Valerio RN  VTE Prevention/Management: (encouraged more walking in room and halls and at home) other (see comments)  Intervention: Prevent Infection  Recent Flowsheet Documentation  Taken 2024 1000 by Shreya Valerio RN  Infection Prevention:   rest/sleep promoted   hand hygiene promoted   single patient room provided  Goal: Optimal Comfort and Wellbeing  Outcome: Progressing  Intervention: Monitor Pain and Promote Comfort  Recent Flowsheet Documentation  Taken 2024 1022 by Shreya Valerio RN  Pain Management Interventions:   medication (see MAR)   pain management plan reviewed with patient/caregiver   pillow support provided   repositioned  Intervention: Provide Person-Centered Care  Recent Flowsheet Documentation  Taken 2024 1000 by Shreya Valerio RN  Trust Relationship/Rapport:   care explained   choices provided   questions answered   questions encouraged   reassurance provided  Goal: Readiness for Transition of Care  Outcome: Progressing     Problem: Postpartum ( Delivery)  Goal: Successful Parent Role Transition  Outcome: Progressing  Intervention: Support Parent Role Transition  Recent Flowsheet Documentation  Taken 2024 1000 by Shreya Valerio RN  Supportive Measures:   active listening utilized   decision-making supported   goal-setting facilitated   self-care encouraged   positive reinforcement provided  Parent-Child Attachment Promotion:   caring behavior modeled   interaction encouraged   strengths emphasized   positive reinforcement provided   participation in care promoted   parent/caregiver presence encouraged  Goal: Hemostasis  Outcome: Progressing  Goal: Effective Bowel Elimination  Outcome: Progressing  Intervention: Enhance Bowel Motility and Elimination  Recent Flowsheet  Documentation  Taken 5/27/2024 1000 by Shreya Valerio, RN  Bowel Motility Enhancement:   ambulation promoted   fluid intake encouraged  Bowel Elimination Promotion:   ambulation promoted   adequate fluid intake promoted  Goal: Fluid and Electrolyte Balance  Outcome: Progressing  Goal: Absence of Infection Signs and Symptoms  Outcome: Progressing  Goal: Anesthesia/Sedation Recovery  Outcome: Progressing  Goal: Optimal Pain Control and Function  Outcome: Progressing  Intervention: Prevent or Manage Pain  Recent Flowsheet Documentation  Taken 5/27/2024 1022 by Shreya Valerio, RN  Pain Management Interventions:   medication (see MAR)   pain management plan reviewed with patient/caregiver   pillow support provided   repositioned  Perineal Care: absorbent brief/pad changed  Goal: Nausea and Vomiting Relief  Outcome: Progressing  Goal: Effective Urinary Elimination  Outcome: Progressing  Intervention: Monitor and Manage Urinary Retention  Recent Flowsheet Documentation  Taken 5/27/2024 1000 by Shreya Valerio RN  Urinary Elimination Promotion:   absorbent pad/diaper use encouraged   frequent voiding encouraged  Goal: Effective Oxygenation and Ventilation  Outcome: Progressing  Intervention: Optimize Oxygenation and Ventilation  Recent Flowsheet Documentation  Taken 5/27/2024 1022 by Shreya Valerio, RN  Head of Bed (HOB) Positioning: HOB at 60-90 degrees

## 2024-05-27 NOTE — PROGRESS NOTES
[facility]  Obstetrics Post-Op / Progress Note         Assessment and Plan:    Assessment:   Post-operative day #2  Low transverse primary  section  L&D complications: Failure to progress      Doing well.  Clean wound without signs of infection.  Normal healing wound.  No immediate surgical complications identified.  No excessive bleeding  Pain well-controlled.      Plan:   Discharge later today            Interval History:     Doing well.  Pain is controlled.  No fevers.  No history of wound drainage, warmth or significant erythema.  Good appetite.  Denies chest pain, shortness of breath, nausea or vomiting.  Breastfeeding well.          Significant Problems:    None          Review of Systems:    The patient denies any chest pain, shortness of breath, excessive pain, fever, chills, purulent drainage from the wound, nausea or vomiting.          Medications:   All medications related to the patient's surgery have been reviewed          Physical Exam:   All vitals stable  Patient Vitals for the past 12 hrs:   BP Temp Temp src Pulse Resp   24 0419 121/67 97.8  F (36.6  C) Oral 90 16   24 2126 119/65 97.6  F (36.4  C) Oral 80 16     Wound clean and dry with minimal or no drainage.  Surrounding skin with minimal erythema.          Data:   All laboratory data related to this surgery reviewed      Deejay Mcneil MD

## 2024-05-27 NOTE — DISCHARGE INSTRUCTIONS
Section: What to Expect at Home  Your Recovery     A  section, or , is surgery to deliver your baby through a cut that the doctor makes in your lower belly and uterus. The cut is called an incision.  You may have some pain in your lower belly and need pain medicine for 1 to 2 weeks. You can expect some vaginal bleeding for several weeks. You will probably need about 6 weeks to fully recover.  It's important to take it easy while the incision heals. Avoid heavy lifting, strenuous activities, and exercises that strain the belly muscles while you recover. Ask a family member or friend for help with housework, cooking, and shopping.  This care sheet gives you a general idea about how long it will take for you to recover. But each person recovers at a different pace. Follow the steps below to get better as quickly as possible.  How can you care for yourself at home?  Activity    Rest when you feel tired. Getting enough sleep will help you recover.     Try to walk each day. Start by walking a little more than you did the day before. Bit by bit, increase the amount you walk. Walking boosts blood flow and helps prevent pneumonia, constipation, and blood clots.     Avoid strenuous activities, such as bicycle riding, jogging, weightlifting, and aerobic exercise, for 6 weeks or until your doctor says it is okay.     Until your doctor says it is okay, do not lift anything heavier than your baby.     Do not do sit-ups or other exercises that strain the belly muscles for 6 weeks or until your doctor says it is okay.     Hold a pillow over your incision when you cough or take deep breaths. This will support your belly and decrease your pain.     You may shower as usual. Pat the incision dry when you are done.     You will have some vaginal bleeding. Wear sanitary pads. Do not douche or use tampons until your doctor says it is okay.     Ask your doctor when you can drive again.     You will probably need  to take at least 6 weeks off work. It depends on the type of work you do and how you feel.     Ask your doctor when it is okay for you to have sex.   Diet    You can eat your normal diet. If your stomach is upset, try bland, low-fat foods like plain rice, broiled chicken, toast, and yogurt.     Drink plenty of fluids (unless your doctor tells you not to).     You may notice that your bowel movements are not regular right after your surgery. This is common. Try to avoid constipation and straining with bowel movements. You may want to take a fiber supplement every day. If you have not had a bowel movement after a couple of days, ask your doctor about taking a mild laxative.     If you are breastfeeding, limit alcohol. Alcohol can cause a lack of energy and other health problems for the baby when a breastfeeding woman drinks heavily. It can also get in the way of a mom's ability to feed her baby or to care for the child in other ways. There isn't a lot of research about exactly how much alcohol can harm a baby. Having no alcohol is the safest choice for your baby. If you choose to have a drink now and then, have only one drink, and limit the number of occasions that you have a drink. Wait to breastfeed at least 2 hours after you have a drink to reduce the amount of alcohol the baby may get in the milk.   Medicines    Your doctor will tell you if and when you can restart your medicines. You will also get instructions about taking any new medicines.     If you stopped taking aspirin or some other blood thinner, your doctor will tell you when to start taking it again.     Take pain medicines exactly as directed.  If the doctor gave you a prescription medicine for pain, take it as prescribed.  If you are not taking a prescription pain medicine, ask your doctor if you can take an over-the-counter medicine.     If you think your pain medicine is making you sick to your stomach:  Take your medicine after meals (unless your  doctor has told you not to).  Ask your doctor for a different pain medicine.     If your doctor prescribed antibiotics, take them as directed. Do not stop taking them just because you feel better. You need to take the full course of antibiotics.   Incision care    If you have strips of tape on the incision, leave the tape on for a week or until it falls off.     Wash the area daily with warm, soapy water, and pat it dry. Don't use hydrogen peroxide or alcohol, which can slow healing. You may cover the area with a gauze bandage if it weeps or rubs against clothing. Change the bandage every day.     Keep the area clean and dry.   Other instructions    If you breastfeed your baby, you may be more comfortable while you are healing if you don't rest your baby on your belly. Try tucking your baby under your arm, with your baby's body along the side you will be feeding on. Support your baby's upper body with your arm. With that hand you can control your baby's head to bring your baby's mouth to your breast. This is sometimes called the football hold.   Follow-up care is a key part of your treatment and safety. Be sure to make and go to all appointments, and call your doctor if you are having problems. It's also a good idea to know your test results and keep a list of the medicines you take.  When should you call for help?  Share this information with your partner, family, or a friend. They can help you watch for warning signs.  Call 911  anytime you think you may need emergency care. For example, call if:    You feel you cannot stop from hurting yourself, your baby, or someone else.     You passed out (lost consciousness).     You have chest pain, are short of breath, or cough up blood.     You have a seizure.   Where to get help 24 hours a day, 7 days a week   If you or someone you know talks about suicide, self-harm, a mental health crisis, a substance use crisis, or any other kind of emotional distress, get help right  away. You can:    Call the Suicide and Crisis Lifeline at 988.     Call 8-885-303-TALK (1-464.457.8067).     Text HOME to 334323 to access the Crisis Text Line.   Consider saving these numbers in your phone.  Go to [employer] for more information or to chat online.  Call your doctor or midwife now or seek immediate medical care if:    You have loose stitches, or your incision comes open.     You have signs of hemorrhage (too much bleeding), such as:  Heavy vaginal bleeding. This means that you are soaking through one or more pads in an hour. Or you pass blood clots bigger than an egg.  Feeling dizzy or lightheaded, or you feel like you may faint.  Feeling so tired or weak that you cannot do your usual activities.  A fast or irregular heartbeat.  New or worse belly pain.     You have symptoms of infection, such as:  Increased pain, swelling, warmth, or redness.  Red streaks leading from the incision.  Pus draining from the incision.  A fever.  Frequent or painful urination or blood in your urine.  Vaginal discharge that smells bad.  New or worse belly pain.     You have symptoms of a blood clot in your leg (called a deep vein thrombosis), such as:  Pain in the calf, back of the knee, thigh, or groin.  Swelling in the leg or groin.  A color change on the leg or groin. The skin may be reddish or purplish, depending on your usual skin color.     You have signs of preeclampsia, such as:  Sudden swelling of your face, hands, or feet.  New vision problems (such as dimness, blurring, or seeing spots).  A severe headache.     You have signs of heart failure, such as:  New or increased shortness of breath.  New or worse swelling in your legs, ankles, or feet.  Sudden weight gain, such as more than 2 to 3 pounds in a day or 5 pounds in a week.  Feeling so tired or weak that you cannot do your usual activities.     You had spinal or epidural pain relief and have:  New or worse back pain.  Increased pain, swelling, warmth,  "or redness at the injection site.  Tingling, weakness, or numbness in your legs or groin.   Watch closely for changes in your health, and be sure to contact your doctor or midwife if:    Your vaginal bleeding isn't decreasing.     You feel sad, anxious, or hopeless for more than a few days.     You are having problems with your breasts or breastfeeding.   Where can you learn more?  Go to https://www.[employer]/patiented  Enter M806 in the search box to learn more about \" Section: What to Expect at Home.\"  Current as of: July 10, 2023               Content Version: 14.0    5158-8449 [employer].   Care instructions adapted under license by your healthcare professional. If you have questions about a medical condition or this instruction, always ask your healthcare professional. [employer] disclaims any warranty or liability for your use of this information.        Warning Signs after Having a Baby    Keep this paper on your fridge or somewhere else where you can see it.    Call your provider if you have any of these symptoms up to 12 weeks after having your baby.    Thoughts of hurting yourself or your baby  Pain in your chest or trouble breathing  Severe headache not helped by pain medicine  Eyesight concerns (blurry vision, seeing spots or flashes of light, other changes to eyesight)  Fainting, shaking or other signs of a seizure    Call  if you feel that it is an emergency.     The symptoms below can happen to anyone after giving birth. They can be very serious. Call your provider if you have any of these warning signs.    My provider s phone number: _______________________    Losing too much blood (hemorrhage)    Call your provider if you soak through a pad in less than an hour or pass blood clots bigger than a golf ball. These may be signs that you are bleeding too much.    Blood clots in the legs or lungs    After you give birth, your body naturally clots its blood to " help prevent blood loss. Sometimes this increased clotting can happen in other areas of the body, like the legs or lungs. This can block your blood flow and be very dangerous.     Call your provider if you:  Have a red, swollen spot on the back of your leg that is warm or painful when you touch it.   Are coughing up blood.     Infection    Call your provider if you have any of these symptoms:  Fever of 100.4 F (38 C) or higher.  Pain or redness around your stitches if you had an incision.   Any yellow, white, or green fluid coming from places where you had stitches or surgery.    Mood Problems (postpartum depression)    Many people feel sad or have mood changes after having a baby. But for some people, these mood swings are worse.     Call your provider right away if you feel so anxious or nervous that you can't care for yourself or your baby.    Preeclampsia (high blood pressure)    Even if you didn't have high blood pressure when you were pregnant, you are at risk for the high blood pressure disease called preeclampsia. This risk can last up to 12 weeks after giving birth.     Call your provider if you have:   Pain on your right side under your rib cage  Sudden swelling in the hands and face    Remember: You know your body. If something doesn't feel right, get medical help.     For informational purposes only. Not to replace the advice of your health care provider. Copyright 2020 [facility]. All rights reserved. Clinically reviewed by Sayra Swanson, RNC-OB, MSN. [employer] [id number] - Rev 02/23.

## 2024-05-27 NOTE — PLAN OF CARE
Goal Outcome Evaluation: ALL GOALS MET      Plan of Care Reviewed With: patient    Overall Patient Progress: improvingOverall Patient Progress: improving    Outcome Evaluation: Pain managed, voiding without difficulty, VSS, Breast and bottle feeding infant.    ALL DISCHARGE INSTRUCTIONS WERE REVIEWED WITH PATIENT BY WRITER AND ALL QUESTIONS ANSWERED. PATIENT IS AWARE TO FOLLOW UP WITH OB WITHIN 6 WEEKS OR SOONER FOR ANY CONCERNS. DISCHARGE MEDICATIONS WILL BE ISSUED AND REVIEWED WHEN THEY COME TO UNIT. PPD S/SX REVIEWED AND COPY OF SCREEN ISSUED TO PATIENT TO LOOK AT OCCASIONALLY. PATIENT WILL LEAVE UNIT SHORTLY. HAS BREAST PUMP AT HOME.

## 2024-05-27 NOTE — PLAN OF CARE
"Data: Vital signs within normal limits. Postpartum checks within normal limits, see flow record. Patient eating and drinking normally. Patient able to empty bladder independently and is up ambulating. No apparent signs of infection. Incision healing well, dressing was removed after her shower. Edges well approximated with steri strips in place, Interdry given to keep area clean and dry. Patient performing self cares and is able to care for infant. Patient is breastfeeding infant and started pumping today to help increase milk supply.   Action: Patient medicated during the shift for pain and cramping. See MAR. Patient reassessed within 1 hour after each medication and pain was improved, patient stated she was comfortable. Patient education done about safe infant sleep, basic infant cares, bulb syringe usage, breastfeeding positions, cues, and satiety. See flow record. PP and  booklet given.   Response: Positive attachment behaviors observed with infant. Support person patient's mother was present overnight.   Plan: Anticipate discharge on 24.      Problem: Adult Inpatient Plan of Care  Goal: Plan of Care Review  Description: The Plan of Care Review/Shift note should be completed every shift.  The Outcome Evaluation is a brief statement about your assessment that the patient is improving, declining, or no change.  This information will be displayed automatically on your shift  note.  Outcome: Progressing  Flowsheets (Taken 2024 0059)  Plan of Care Reviewed With: patient  Overall Patient Progress: improving  Goal: Patient-Specific Goal (Individualized)  Description: You can add care plan individualizations to a care plan. Examples of Individualization might be:  \"Parent requests to be called daily at 9am for status\", \"I have a hard time hearing out of my right ear\", or \"Do not touch me to wake me up as it startles  me\".  Outcome: Progressing  Goal: Absence of Hospital-Acquired Illness or " Injury  Outcome: Progressing  Intervention: Prevent Skin Injury  Recent Flowsheet Documentation  Taken 5/26/2024 2126 by Lindsay Grewal RN  Body Position: position changed independently  Skin Protection: protective footwear used  Intervention: Prevent and Manage VTE (Venous Thromboembolism) Risk  Recent Flowsheet Documentation  Taken 5/26/2024 2126 by Lindsay Grewal RN  VTE Prevention/Management: (encouraged fluids and ambulation)   SCDs (sequential compression devices) off   other (see comments)  Intervention: Prevent Infection  Recent Flowsheet Documentation  Taken 5/26/2024 2126 by Lindsay Grewal RN  Infection Prevention:   equipment surfaces disinfected   hand hygiene promoted   rest/sleep promoted   personal protective equipment utilized   single patient room provided  Goal: Optimal Comfort and Wellbeing  Outcome: Progressing  Intervention: Provide Person-Centered Care  Recent Flowsheet Documentation  Taken 5/26/2024 2126 by Lindsay Grewal RN  Trust Relationship/Rapport:   care explained   emotional support provided   choices provided   empathic listening provided   questions answered   questions encouraged   reassurance provided   thoughts/feelings acknowledged  Goal: Readiness for Transition of Care  Outcome: Progressing         Goal Outcome Evaluation:      Plan of Care Reviewed With: patient    Overall Patient Progress: improvingOverall Patient Progress: improving

## 2024-05-27 NOTE — DISCHARGE SUMMARY
[facility] Discharge Summary    Connie Bhakta MRN# [id number]   Age: 33 year old YOB: 1990     Date of Admission:  2024  Date of Discharge::  2024  Admitting Physician:  Moose Villeda MD  Discharge Physician:  Deejay Mcneil MD     Home clinic: [facility]          Admission Diagnoses:   Pregnancy  Indication for care in labor or delivery          Discharge Diagnosis:     Failure to progress          Procedures:     Procedure(s): Primary low transverse  section                  Medications Prior to Admission:     Medications Prior to Admission   Medication Sig Dispense Refill Last Dose    levothyroxine (SYNTHROID/LEVOTHROID) 50 MCG tablet Take 1 tablet (50 mcg) by mouth daily 90 tablet 1 Past Week    Prenatal Vit-Fe Fumarate-FA (PRENATAL VITAMIN) 27-0.8 MG TABS    Past Week    [DISCONTINUED] Vitamin D, Cholecalciferol, 25 MCG (1000 UT) CAPS    Unknown             Discharge Medications:     Current Discharge Medication List        START taking these medications    Details   HYDROcodone-acetaminophen (NORCO) 5-325 MG tablet Take 1 tablet by mouth every 6 hours as needed  Qty: 15 tablet, Refills: 0    Associated Diagnoses: Indication for care in labor or delivery      ibuprofen (ADVIL/MOTRIN) 800 MG tablet Take 1 tablet (800 mg) by mouth every 8 hours as needed for moderate pain  Qty: 25 tablet, Refills: 0    Associated Diagnoses: Indication for care in labor or delivery           CONTINUE these medications which have NOT CHANGED    Details   levothyroxine (SYNTHROID/LEVOTHROID) 50 MCG tablet Take 1 tablet (50 mcg) by mouth daily  Qty: 90 tablet, Refills: 1    Associated Diagnoses: Hypothyroidism affecting pregnancy in third trimester      Prenatal Vit-Fe Fumarate-FA (PRENATAL VITAMIN) 27-0.8 MG TABS            STOP taking these medications       Vitamin D, Cholecalciferol, 25 MCG (1000 UT) CAPS Comments:   Reason for Stopping:                      Consultations:   No consultations were requested during this admission          Brief History of Labor or Admission:     Expand All Collapse All    OB  Delivery Note        Connie Bhakta MRN# [id number]   Age: 33 year old YOB: 1990      Connie Bhakta is a 33 year old  at 39w1d who was admitted last night for IOL in the setting of LGA fetus and 2VC. She has received 6 doses of PO misoprostol followed by pitocin.  She came to complete and pushed for 4+ hours with little progression beyond a + 1 stage     GA: 39w2d  GP:   Labor Complications: Failure to Progress in Second Stage   EBL:   mL  Delivery QBL:    Delivery Type: , Low Transverse   ROM to Delivery Time: (Delivered) Hours: 13 Minutes: 17       1 Minute 5 Minute 10 Minute   Apgar Totals: 7   9        Personel Present: SANJIV SCALES       Details     Pre-Op Diagnosis: 1. Intrauterine pregnancy at 39w2d  2. Arrest of descent   Post-Op Diagnosis: 1. same   Indications:  Failed induction;Arrest of descent    Procedure:   , Low Transverse  via   incision   Anesthesia: Epidural       Informed Consent:  The risks, benefits, complications, and alternatives were discussed with the patient. The patient understood that the risks of  section include, but are not limited to: injury to nearby structures or organs, infection, blood loss and possible need for transfusion, and potential need for more surgery including hysterectomy. The patient stated understanding and desired to proceed. All questions were answered. The site of surgery was properly noted and marked. The patient was identified as Connie Bhakta and the procedure verified as a  delivery. A Time Out was held and the above information confirmed.     Procedure Details:  The patient was taken to the operating room where Epidural  anesthesia was found to be adequate. Antibiotics were given for infection  prophylaxis. She was prepped and draped in the normal sterile fashion in the dorsal supine position with leftward tilt. A Pfannenstiel skin incision was made with scalpel. The incision was carried down to the fascia with bovie cauterization. The fascia was incised and extended laterally with Portillo scissors. The inferior aspect of the fascia was grasped with Kocher clamps. The underlying rectus and pyramidalis muscles were dissected off with Portillo scissors. In a similar fashion, the superior aspect of the fascia was elevated with Kocher clamps, and the rectus muscle was dissected off. The rectus muscles were  bluntly down the midline down to the level of the pubic symphysis. The peritoneum was identified and entered bluntly. Peritoneal incision was extended superiorly and inferiorly with good visualization of the bladder.     The bladder blade was inserted, vesicouterine peritoneum was identified, and bladder flap created bluntly. The lower uterine segment was then incised with a   incision and was extended bluntly. The amniotic sac was ruptured and Clear  color noted. The bladder blade was removed and the infant was delivered atraumatically using the usual maneuvers. The remainder of the infant was delivered, the cord clamped and cut, and the baby was handed off to the awaiting clinicians.      The placenta was removed via manual massage. The uterus was then exteriorized and cleared of all clots and debris. The hysterotomy was repaired with suture of 0 Vicryl in a running locked fashion. A second imbricating layer of the same suture was placed and good hemostasis observed. The ovaries and tubes appeared normal bilaterally. The uterus, tubes, and ovaries were then returned to the abdomen and pelvis. The uterine incision was reinspected and found to be hemostatic. The subfascial spaces were inspected and noted to be hemostatic. Anterior peritoneum repaired with 4-) vicryl. The fascia was then reapproximated  with two running sutures of 0 Vicryl tied at the midline. The skin was closed with Ensorb staples.     The patient tolerated the procedure well. Sponge, instrument, and needle counts were correct and the patient was taken to the recovery room in good and stable condition.                      Hospital Course:   The patient's hospital course was unremarkable.  She recovered as anticipated and experienced no post-operative complications.  On discharge, her pain was well controlled. Vaginal bleeding is similar to peak menstrual flow.  Voiding without difficulty.  Ambulating well and tolerating a normal diet.  No fever or significant wound drainage.  Breastfeeding well.  Infant is stable.  No bowel movement yet.  She was discharged on post-partum day #2.    Post-partum hemoglobin:   Hemoglobin   Date Value Ref Range Status   05/26/2024 9.0 (L) 11.7 - 15.7 g/dL Final   11/22/2017 12.9 11.7 - 15.7 g/dL Final             Discharge Instructions and Follow-Up:     Discharge diet: Regular   Discharge activity: Activity as tolerated   Discharge follow-up: Follow up with primary care provider in 6 weeks   Wound care:            Discharge Disposition:     Discharged to home      Attestation:  I have reviewed today's vital signs, notes, medications, labs and imaging.    Deejay Mcneil MD

## 2024-05-28 LAB
RUBV IGG SERPL QL IA: 0.73 INDEX
RUBV IGG SERPL QL IA: NORMAL

## 2024-06-24 ENCOUNTER — MYC MEDICAL ADVICE (OUTPATIENT)
Dept: PEDIATRICS | Facility: CLINIC | Age: 34
End: 2024-06-24
Payer: COMMERCIAL

## 2024-06-24 NOTE — TELEPHONE ENCOUNTER
My Chart sent to do e-visit.  Forwarded to provider for thyroid question - OB deferred this issue to PCP already.  Amy Cotton, CMA

## 2024-06-24 NOTE — TELEPHONE ENCOUNTER
Please see patient MyChart message and advise. Do you want visit to discuss (e-visit, in-person)?     Jose JC RN 6/24/2024 at 1:08 PM

## 2024-06-25 ENCOUNTER — E-VISIT (OUTPATIENT)
Dept: PEDIATRICS | Facility: CLINIC | Age: 34
End: 2024-06-25
Payer: COMMERCIAL

## 2024-06-25 DIAGNOSIS — O99.283 HYPOTHYROIDISM AFFECTING PREGNANCY IN THIRD TRIMESTER: ICD-10-CM

## 2024-06-25 DIAGNOSIS — F41.9 ANXIETY: ICD-10-CM

## 2024-06-25 DIAGNOSIS — E03.9 HYPOTHYROIDISM AFFECTING PREGNANCY IN THIRD TRIMESTER: ICD-10-CM

## 2024-06-25 PROCEDURE — 99421 OL DIG E/M SVC 5-10 MIN: CPT | Performed by: NURSE PRACTITIONER

## 2024-06-25 NOTE — TELEPHONE ENCOUNTER
Provider E-Visit time total (minutes): 5        11/6/2017     1:00 PM 12/13/2021     8:39 AM 6/26/2024     5:05 PM   PHQ   PHQ-9 Total Score 6 5 3   Q9: Thoughts of better off dead/self-harm past 2 weeks Not at all Not at all Not at all     TSH   Date Value Ref Range Status   03/19/2024 1.75 0.30 - 4.20 uIU/mL Final   01/19/2024 1.44 0.30 - 4.20 uIU/mL Final   11/28/2023 1.09 0.30 - 4.20 uIU/mL Final   03/21/2022 3.75 0.40 - 4.00 mU/L Final   09/16/2016 2.07 0.40 - 4.00 mU/L Final   12/12/2013 2.959 0.300 - 5.000 uIU/mL Final         3/21/2022     8:47 AM 8/17/2023     2:36 PM 6/26/2024     5:04 PM   MIMI-7 SCORE   Total Score   6 (mild anxiety)   Total Score 5 7 6

## 2024-06-26 ASSESSMENT — PATIENT HEALTH QUESTIONNAIRE - PHQ9
10. IF YOU CHECKED OFF ANY PROBLEMS, HOW DIFFICULT HAVE THESE PROBLEMS MADE IT FOR YOU TO DO YOUR WORK, TAKE CARE OF THINGS AT HOME, OR GET ALONG WITH OTHER PEOPLE: NOT DIFFICULT AT ALL
SUM OF ALL RESPONSES TO PHQ QUESTIONS 1-9: 3
SUM OF ALL RESPONSES TO PHQ QUESTIONS 1-9: 3

## 2024-06-26 ASSESSMENT — ANXIETY QUESTIONNAIRES
7. FEELING AFRAID AS IF SOMETHING AWFUL MIGHT HAPPEN: NOT AT ALL
4. TROUBLE RELAXING: SEVERAL DAYS
7. FEELING AFRAID AS IF SOMETHING AWFUL MIGHT HAPPEN: NOT AT ALL
1. FEELING NERVOUS, ANXIOUS, OR ON EDGE: SEVERAL DAYS
GAD7 TOTAL SCORE: 6
IF YOU CHECKED OFF ANY PROBLEMS ON THIS QUESTIONNAIRE, HOW DIFFICULT HAVE THESE PROBLEMS MADE IT FOR YOU TO DO YOUR WORK, TAKE CARE OF THINGS AT HOME, OR GET ALONG WITH OTHER PEOPLE: NOT DIFFICULT AT ALL
3. WORRYING TOO MUCH ABOUT DIFFERENT THINGS: SEVERAL DAYS
5. BEING SO RESTLESS THAT IT IS HARD TO SIT STILL: SEVERAL DAYS
6. BECOMING EASILY ANNOYED OR IRRITABLE: SEVERAL DAYS
8. IF YOU CHECKED OFF ANY PROBLEMS, HOW DIFFICULT HAVE THESE MADE IT FOR YOU TO DO YOUR WORK, TAKE CARE OF THINGS AT HOME, OR GET ALONG WITH OTHER PEOPLE?: NOT DIFFICULT AT ALL
2. NOT BEING ABLE TO STOP OR CONTROL WORRYING: SEVERAL DAYS
GAD7 TOTAL SCORE: 6

## 2024-06-27 RX ORDER — LEVOTHYROXINE SODIUM 50 UG/1
50 TABLET ORAL DAILY
Qty: 90 TABLET | Refills: 3 | Status: SHIPPED | OUTPATIENT
Start: 2024-06-27

## 2024-07-10 ENCOUNTER — PRENATAL OFFICE VISIT (OUTPATIENT)
Dept: OBGYN | Facility: CLINIC | Age: 34
End: 2024-07-10
Payer: COMMERCIAL

## 2024-07-10 VITALS
HEIGHT: 64 IN | BODY MASS INDEX: 31.41 KG/M2 | DIASTOLIC BLOOD PRESSURE: 62 MMHG | WEIGHT: 184 LBS | SYSTOLIC BLOOD PRESSURE: 102 MMHG

## 2024-07-10 PROBLEM — Z98.890 HX LEEP (LOOP ELECTROSURGICAL EXCISION PROCEDURE), CERVIX, PREGNANCY: Status: RESOLVED | Noted: 2023-11-06 | Resolved: 2024-07-10

## 2024-07-10 PROBLEM — O34.40 HX LEEP (LOOP ELECTROSURGICAL EXCISION PROCEDURE), CERVIX, PREGNANCY: Status: RESOLVED | Noted: 2023-11-06 | Resolved: 2024-07-10

## 2024-07-10 PROBLEM — O09.899 TWO VESSEL UMBILICAL CORD, ANTEPARTUM: Status: RESOLVED | Noted: 2024-02-17 | Resolved: 2024-07-10

## 2024-07-10 PROBLEM — O36.63X0 EXCESSIVE FETAL GROWTH AFFECTING MANAGEMENT OF PREGNANCY IN THIRD TRIMESTER: Status: RESOLVED | Noted: 2024-03-18 | Resolved: 2024-07-10

## 2024-07-10 PROBLEM — O21.9 NAUSEA AND VOMITING DURING PREGNANCY: Status: RESOLVED | Noted: 2023-11-08 | Resolved: 2024-07-10

## 2024-07-10 PROBLEM — R82.71 GBS BACTERIURIA: Status: RESOLVED | Noted: 2023-11-05 | Resolved: 2024-07-10

## 2024-07-10 PROCEDURE — 99207 PR POST PARTUM EXAM: CPT | Performed by: OBSTETRICS & GYNECOLOGY

## 2024-07-10 NOTE — PROGRESS NOTES
"SUBJECTIVE: Connie is here for a 6-week postpartum checkup.    Delivery date was 2024. She had a primary LTCS for 2nd stage arrest of labor of a viable girl, weight 8 pounds 5 oz., with no complications.  Since delivery, she has been breast feeding.  She has No signs of infection, bleeding or other complications.  She is not pregnant.  We discussed contraceptions and she has chosen none.  She  has not had intercourse since delivery and complains of No discomfort. Patient screened for postpartum depression and complaints are NEGATIVE. Screening has also been completed for intimate partner violence.    EXAM:  Today's Depression Rating was       2024     5:05 PM   PHQ-9 SCORE   PHQ-9 Total Score MyChart 3 (Minimal depression)   PHQ-9 Total Score 3   /62 (BP Location: Right arm, Patient Position: Sitting, Cuff Size: Adult Regular)   Ht 1.626 m (5' 4\")   Wt 83.5 kg (184 lb)   LMP 2023   Breastfeeding Yes   BMI 31.58 kg/m    Abdomen- Abdomen soft, non-tender. BS normal. No masses, organomegaly, positive findings: Pfannenstial scar well-healed  Pelvic- Exam chaperoned by nurse, External genitalia normal, [place]'s glands normal, [place]'s glands normal, Urethral meatus normal, Urethra normal, Bladder normal, Vagina with normal rugae, no abnormal lesions, no abnormal discharge, Normal cervix without lesions or mucopus, no cervical motion tenderness, Uterus normal size, shape, and contour, no masses, non-tender, Adnexa normal size without masses or tenderness bilaterally, Anus normal      ASSESSMENT:   Encounter Diagnosis   Name Primary?    Postpartum care following  delivery Yes         PLAN:  1) Not due for Pap/HPV until 2026.  2) Return as needed or at time of next expected pelvic, or breast exam.      Domenic Leonard MD      "

## 2024-07-10 NOTE — NURSING NOTE
"Chief Complaint   Patient presents with    Post Partum Exam             Initial /62 (BP Location: Right arm, Patient Position: Sitting, Cuff Size: Adult Regular)   Ht 1.626 m (5' 4\")   Wt 83.5 kg (184 lb)   LMP 2023   Breastfeeding Yes   BMI 31.58 kg/m   Estimated body mass index is 31.58 kg/m  as calculated from the following:    Height as of this encounter: 1.626 m (5' 4\").    Weight as of this encounter: 83.5 kg (184 lb).  BP completed using cuff size: regular    Questioned patient about current smoking habits.  Pt. has never smoked.          The following HM Due: NONE    Evan Cotton CMA                "

## 2024-07-19 ENCOUNTER — LAB (OUTPATIENT)
Dept: LAB | Facility: CLINIC | Age: 34
End: 2024-07-19
Payer: COMMERCIAL

## 2024-07-19 DIAGNOSIS — E03.9 HYPOTHYROIDISM AFFECTING PREGNANCY IN THIRD TRIMESTER: ICD-10-CM

## 2024-07-19 DIAGNOSIS — O99.283 HYPOTHYROIDISM AFFECTING PREGNANCY IN THIRD TRIMESTER: ICD-10-CM

## 2024-07-19 PROCEDURE — 84443 ASSAY THYROID STIM HORMONE: CPT

## 2024-07-19 PROCEDURE — 36415 COLL VENOUS BLD VENIPUNCTURE: CPT

## 2024-07-20 LAB — TSH SERPL DL<=0.005 MIU/L-ACNC: 1.84 UIU/ML (ref 0.3–4.2)

## 2024-08-27 ENCOUNTER — MYC MEDICAL ADVICE (OUTPATIENT)
Dept: OBGYN | Facility: CLINIC | Age: 34
End: 2024-08-27
Payer: COMMERCIAL

## 2024-08-27 NOTE — TELEPHONE ENCOUNTER
Call to pt regarding lump under her C/S incision.    States that Dr Leonard told her to follow up with him if lump did not resolve over the next few weeks.   Feels it is the size of a ping pong ball. Has not changed much in size since PP visit. It gets sore on sides around lump when moving. The lump it self does not seem to be tender to the touch.    Appt made for follow up with Dr Leonard on 8/28.    Michell DOMINGO RN  [facility] [place]

## 2024-08-28 ENCOUNTER — OFFICE VISIT (OUTPATIENT)
Dept: OBGYN | Facility: CLINIC | Age: 34
End: 2024-08-28
Payer: COMMERCIAL

## 2024-08-28 VITALS — BODY MASS INDEX: 31.98 KG/M2 | DIASTOLIC BLOOD PRESSURE: 74 MMHG | WEIGHT: 186.3 LBS | SYSTOLIC BLOOD PRESSURE: 104 MMHG

## 2024-08-28 DIAGNOSIS — L92.9 GRANULOMA OF SUBCUTANEOUS TISSUE: Primary | ICD-10-CM

## 2024-08-28 PROCEDURE — 99212 OFFICE O/P EST SF 10 MIN: CPT | Performed by: OBSTETRICS & GYNECOLOGY

## 2024-08-28 NOTE — NURSING NOTE
"Chief Complaint   Patient presents with    Follow Up     Incision check   C/S 24   PP visit 7/10/24        Initial /74 (BP Location: Right arm, Cuff Size: Adult Regular)   Wt 84.5 kg (186 lb 4.8 oz)   Breastfeeding No   BMI 31.98 kg/m   Estimated body mass index is 31.98 kg/m  as calculated from the following:    Height as of 7/10/24: 1.626 m (5' 4\").    Weight as of this encounter: 84.5 kg (186 lb 4.8 oz).  BP completed using cuff size: regular    Questioned patient about current smoking habits.  Pt. has never smoked.          The following HM Due: NONE      Corazon Whalen CMA on 2024 at 11:35 AM    "

## 2024-08-28 NOTE — PROGRESS NOTES
"  Assessment & Plan     Granuloma of subcutaneous tissue  - Pt advised to monitor for up to 1 year post-C/S as it can take up to a full year for this to resolve.  - RTC if redness, drainage, fever, or enlargement of the area is noted. Would consider CT Abd/Pelvis if this occurs or it doesn't resolve after 1 year.        BMI  Estimated body mass index is 31.98 kg/m  as calculated from the following:    Height as of 7/10/24: 1.626 m (5' 4\").    Weight as of this encounter: 84.5 kg (186 lb 4.8 oz).             No follow-ups on file.    Subjective   Connie is a 33 year old, presenting for the following health issues:  Follow Up (Incision check   C/S 5/25/24   PP visit 7/10/24 )    Pt is 3 months post-primary LTCS for arrest of labor. She noted a firm lump under right side of Pfannenstiel scar that has persisted since delivery. There is no redness, drainage, or pain at the site.                       Objective    /74 (BP Location: Right arm, Cuff Size: Adult Regular)   Wt 84.5 kg (186 lb 4.8 oz)   Breastfeeding No   BMI 31.98 kg/m    Body mass index is 31.98 kg/m .  Physical Exam  Abdominal:                        Signed Electronically by: Domenic Leonard MD    "

## 2024-09-21 ENCOUNTER — HEALTH MAINTENANCE LETTER (OUTPATIENT)
Age: 34
End: 2024-09-21

## 2024-11-25 ENCOUNTER — OFFICE VISIT (OUTPATIENT)
Dept: PEDIATRICS | Facility: CLINIC | Age: 34
End: 2024-11-25
Payer: COMMERCIAL

## 2024-11-25 VITALS
BODY MASS INDEX: 32.2 KG/M2 | HEART RATE: 89 BPM | HEIGHT: 64 IN | RESPIRATION RATE: 15 BRPM | WEIGHT: 188.6 LBS | TEMPERATURE: 97.9 F | SYSTOLIC BLOOD PRESSURE: 119 MMHG | OXYGEN SATURATION: 100 % | DIASTOLIC BLOOD PRESSURE: 76 MMHG

## 2024-11-25 DIAGNOSIS — Z12.4 CERVICAL CANCER SCREENING: ICD-10-CM

## 2024-11-25 DIAGNOSIS — N87.1 MODERATE DYSPLASIA OF CERVIX (CIN II): ICD-10-CM

## 2024-11-25 DIAGNOSIS — Z00.00 ROUTINE GENERAL MEDICAL EXAMINATION AT A HEALTH CARE FACILITY: Primary | ICD-10-CM

## 2024-11-25 DIAGNOSIS — F41.9 ANXIETY: ICD-10-CM

## 2024-11-25 LAB
CHOLEST SERPL-MCNC: 221 MG/DL
FASTING STATUS PATIENT QL REPORTED: YES
HDLC SERPL-MCNC: 51 MG/DL
LDLC SERPL CALC-MCNC: 152 MG/DL
NONHDLC SERPL-MCNC: 170 MG/DL
TRIGL SERPL-MCNC: 88 MG/DL

## 2024-11-25 PROCEDURE — 91320 SARSCV2 VAC 30MCG TRS-SUC IM: CPT | Performed by: NURSE PRACTITIONER

## 2024-11-25 PROCEDURE — 99395 PREV VISIT EST AGE 18-39: CPT | Performed by: NURSE PRACTITIONER

## 2024-11-25 PROCEDURE — 99213 OFFICE O/P EST LOW 20 MIN: CPT | Mod: 25 | Performed by: NURSE PRACTITIONER

## 2024-11-25 PROCEDURE — 36415 COLL VENOUS BLD VENIPUNCTURE: CPT | Performed by: NURSE PRACTITIONER

## 2024-11-25 PROCEDURE — 80061 LIPID PANEL: CPT | Performed by: NURSE PRACTITIONER

## 2024-11-25 PROCEDURE — 90480 ADMN SARSCOV2 VAC 1/ONLY CMP: CPT | Performed by: NURSE PRACTITIONER

## 2024-11-25 SDOH — HEALTH STABILITY: PHYSICAL HEALTH: ON AVERAGE, HOW MANY DAYS PER WEEK DO YOU ENGAGE IN MODERATE TO STRENUOUS EXERCISE (LIKE A BRISK WALK)?: 3 DAYS

## 2024-11-25 SDOH — HEALTH STABILITY: PHYSICAL HEALTH: ON AVERAGE, HOW MANY MINUTES DO YOU ENGAGE IN EXERCISE AT THIS LEVEL?: 60 MIN

## 2024-11-25 ASSESSMENT — ANXIETY QUESTIONNAIRES
1. FEELING NERVOUS, ANXIOUS, OR ON EDGE: SEVERAL DAYS
4. TROUBLE RELAXING: SEVERAL DAYS
2. NOT BEING ABLE TO STOP OR CONTROL WORRYING: SEVERAL DAYS
GAD7 TOTAL SCORE: 6
8. IF YOU CHECKED OFF ANY PROBLEMS, HOW DIFFICULT HAVE THESE MADE IT FOR YOU TO DO YOUR WORK, TAKE CARE OF THINGS AT HOME, OR GET ALONG WITH OTHER PEOPLE?: SOMEWHAT DIFFICULT
7. FEELING AFRAID AS IF SOMETHING AWFUL MIGHT HAPPEN: SEVERAL DAYS
GAD7 TOTAL SCORE: 6
3. WORRYING TOO MUCH ABOUT DIFFERENT THINGS: SEVERAL DAYS
7. FEELING AFRAID AS IF SOMETHING AWFUL MIGHT HAPPEN: SEVERAL DAYS
6. BECOMING EASILY ANNOYED OR IRRITABLE: SEVERAL DAYS
IF YOU CHECKED OFF ANY PROBLEMS ON THIS QUESTIONNAIRE, HOW DIFFICULT HAVE THESE PROBLEMS MADE IT FOR YOU TO DO YOUR WORK, TAKE CARE OF THINGS AT HOME, OR GET ALONG WITH OTHER PEOPLE: SOMEWHAT DIFFICULT
5. BEING SO RESTLESS THAT IT IS HARD TO SIT STILL: NOT AT ALL
GAD7 TOTAL SCORE: 6

## 2024-11-25 ASSESSMENT — SOCIAL DETERMINANTS OF HEALTH (SDOH): HOW OFTEN DO YOU GET TOGETHER WITH FRIENDS OR RELATIVES?: MORE THAN THREE TIMES A WEEK

## 2024-11-25 ASSESSMENT — PAIN SCALES - GENERAL: PAINLEVEL_OUTOF10: NO PAIN (0)

## 2024-11-25 NOTE — PATIENT INSTRUCTIONS
Patient Education   Preventive Care Advice   This is general advice given by our system to help you stay healthy. However, your care team may have specific advice just for you. Please talk to your care team about your preventive care needs.  Nutrition  Eat 5 or more servings of fruits and vegetables each day.  Try wheat bread, brown rice and whole grain pasta (instead of white bread, rice, and pasta).  Get enough calcium and vitamin D. Check the label on foods and aim for 100% of the RDA (recommended daily allowance).  Lifestyle  Exercise at least 150 minutes each week  (30 minutes a day, 5 days a week).  Do muscle strengthening activities 2 days a week. These help control your weight and prevent disease.  No smoking.  Wear sunscreen to prevent skin cancer.  Have a dental exam and cleaning every 6 months.  Yearly exams  See your health care team every year to talk about:  Any changes in your health.  Any medicines your care team has prescribed.  Preventive care, family planning, and ways to prevent chronic diseases.  Shots (vaccines)   HPV shots (up to age 26), if you've never had them before.  Hepatitis B shots (up to age 59), if you've never had them before.  COVID-19 shot: Get this shot when it's due.  Flu shot: Get a flu shot every year.  Tetanus shot: Get a tetanus shot every 10 years.  Pneumococcal, hepatitis A, and RSV shots: Ask your care team if you need these based on your risk.  Shingles shot (for age 50 and up)  General health tests  Diabetes screening:  Starting at age 35, Get screened for diabetes at least every 3 years.  If you are younger than age 35, ask your care team if you should be screened for diabetes.  Cholesterol test: At age 39, start having a cholesterol test every 5 years, or more often if advised.  Bone density scan (DEXA): At age 50, ask your care team if you should have this scan for osteoporosis (brittle bones).  Hepatitis C: Get tested at least once in your life.  STIs (sexually  transmitted infections)  Before age 24: Ask your care team if you should be screened for STIs.  After age 24: Get screened for STIs if you're at risk. You are at risk for STIs (including HIV) if:  You are sexually active with more than one person.  You don't use condoms every time.  You or a partner was diagnosed with a sexually transmitted infection.  If you are at risk for HIV, ask about PrEP medicine to prevent HIV.  Get tested for HIV at least once in your life, whether you are at risk for HIV or not.  Cancer screening tests  Cervical cancer screening: If you have a cervix, begin getting regular cervical cancer screening tests starting at age 21.  Breast cancer scan (mammogram): If you've ever had breasts, begin having regular mammograms starting at age 40. This is a scan to check for breast cancer.  Colon cancer screening: It is important to start screening for colon cancer at age 45.  Have a colonoscopy test every 10 years (or more often if you're at risk) Or, ask your provider about stool tests like a FIT test every year or Cologuard test every 3 years.  To learn more about your testing options, visit:   .  For help making a decision, visit:   https://bit.ly/kq85192.  Prostate cancer screening test: If you have a prostate, ask your care team if a prostate cancer screening test (PSA) at age 55 is right for you.  Lung cancer screening: If you are a current or former smoker ages 50 to 80, ask your care team if ongoing lung cancer screenings are right for you.  For informational purposes only. Not to replace the advice of your health care provider. Copyright   2023 [employer]. All rights reserved. Clinically reviewed by the [facility]. [employer] [id number] - REV 01/24.

## 2024-11-25 NOTE — PROGRESS NOTES
"Preventive Care Visit  [facility] NORBERTO Olivas CNP, Internal Medicine - Pediatrics  Nov 25, 2024      Assessment & Plan     Routine general medical examination at a health care facility    - Lipid panel reflex to direct LDL Fasting; Future  - Lipid panel reflex to direct LDL Fasting    Cervical cancer screening  On menses currently, requests to returnt o clinic for pap only, will schedule    Anxiety  Stable on current regime, made no changes.   - sertraline (ZOLOFT) 50 MG tablet; Take 1 tablet (50 mg) by mouth daily.    Moderate dysplasia of cervix (DIPIKA II)              BMI  Estimated body mass index is 32.6 kg/m  as calculated from the following:    Height as of this encounter: 1.62 m (5' 3.78\").    Weight as of this encounter: 85.5 kg (188 lb 9.6 oz).       Counseling  Appropriate preventive services were addressed with this patient via screening, questionnaire, or discussion as appropriate for fall prevention, nutrition, physical activity, Tobacco-use cessation, social engagement, weight loss and cognition.  Checklist reviewing preventive services available has been given to the patient.  Reviewed patient's diet, addressing concerns and/or questions.   She is at risk for lack of exercise and has been provided with information to increase physical activity for the benefit of her well-being.           Nabor Rosales is a 34 year old, presenting for the following:  Physical        11/25/2024     8:50 AM   Additional Questions   Roomed by nanette GAMA   Accompanied by Self         11/25/2024     8:50 AM   Patient Reported Additional Medications   Patient reports taking the following new medications No          Healthy Habits:     Getting at least 3 servings of Calcium per day:  Yes    Bi-annual eye exam:  Yes    Dental care twice a year:  Yes    Sleep apnea or symptoms of sleep apnea:  None    Diet:  Regular (no restrictions)    Frequency of exercise:  2-3 days/week    " Duration of exercise:  45-60 minutes    Taking medications regularly:  Yes    Barriers to taking medications:  None    Medication side effects:  None    Additional concerns today:  No (is pap due for today? period started saturday)    Had a baby last may, had thyroid hormone abnormal during pregn, was on 50 mcg but stopped. Has had tsh since then, wnl.     TSH   Date Value Ref Range Status   07/19/2024 1.84 0.30 - 4.20 uIU/mL Final   03/21/2022 3.75 0.40 - 4.00 mU/L Final   09/16/2016 2.07 0.40 - 4.00 mU/L Final     History of anxiety, on zoloft, notes no changes      8/17/2023     2:36 PM 6/26/2024     5:04 PM 11/25/2024     5:47 AM   MIMI-7 SCORE   Total Score  6 (mild anxiety) 6 (mild anxiety)   Total Score 7 6 6        Patient-reported       Health Care Directive  Patient does not have a Health Care Directive: Discussed advance care planning with patient; however, patient declined at this time.      11/25/2024   General Health   How would you rate your overall physical health? Good   Feel stress (tense, anxious, or unable to sleep) Only a little      (!) STRESS CONCERN      11/25/2024   Nutrition   Three or more servings of calcium each day? Yes   Diet: Regular (no restrictions)   How many servings of fruit and vegetables per day? (!) 0-1   How many sweetened beverages each day? 0-1            11/25/2024   Exercise   Days per week of moderate/strenous exercise 3 days   Average minutes spent exercising at this level 60 min            11/25/2024   Social Factors   Frequency of gathering with friends or relatives More than three times a week   Worry food won't last until get money to buy more No   Food not last or not have enough money for food? No   Do you have housing? (Housing is defined as stable permanent housing and does not include staying ouside in a car, in a tent, in an abandoned building, in an overnight shelter, or couch-surfing.) Yes   Are you worried about losing your housing? No   Lack of  transportation? No   Unable to get utilities (heat,electricity)? No            11/25/2024   Dental   Dentist two times every year? Yes            11/25/2024   TB Screening   Were you born outside of the [place]? No              Today's PHQ-2 Score:       3/19/2024     3:48 PM   PHQ-2 ( 1999 [employer])   Q1: Little interest or pleasure in doing things 0   Q2: Feeling down, depressed or hopeless 0   PHQ-2 Score 0         11/25/2024   Substance Use   Alcohol more than 3/day or more than 7/wk No   Do you use any other substances recreationally? No        Social History     Tobacco Use    Smoking status: Never     Passive exposure: Never    Smokeless tobacco: Never   Vaping Use    Vaping status: Never Used   Substance Use Topics    Alcohol use: Not Currently     Comment: once a month    Drug use: No           8/15/2023   LAST FHS-7 RESULTS   1st degree relative breast or ovarian cancer No    Any relative bilateral breast cancer No    Any male have breast cancer No    Any ONE woman have BOTH breast AND ovarian cancer No    Any woman with breast cancer before 50yrs No    2 or more relatives with breast AND/OR ovarian cancer No    2 or more relatives with breast AND/OR bowel cancer No        Patient-reported        Mammogram Screening - Patient under 40 years of age: Routine Mammogram Screening not recommended.         11/25/2024   STI Screening   New sexual partner(s) since last STI/HIV test? No        History of abnormal Pap smear: YES - reflected in Problem List and Health Maintenance accordingly        Latest Ref Rng & Units 11/6/2023    10:44 AM 3/21/2022     8:33 AM 3/2/2021    11:10 AM   PAP / HPV   PAP  Negative for Intraepithelial Lesion or Malignancy (NILM)  Negative for Intraepithelial Lesion or Malignancy (NILM)     HPV 16 DNA Negative Negative  Negative  Positive    HPV 18 DNA Negative Negative  Negative  Negative    Other HR HPV Negative Negative  Negative  Negative            11/25/2024   Contraception/Family  "Planning   Questions about contraception or family planning No           Reviewed and updated as needed this visit by Provider     Meds  Problems    Fam Hx                     Objective    Exam  /76 (BP Location: Right arm, Patient Position: Sitting, Cuff Size: Adult Regular)   Pulse 89   Temp 97.9  F (36.6  C) (Oral)   Resp 15   Ht 1.62 m (5' 3.78\")   Wt 85.5 kg (188 lb 9.6 oz)   LMP 11/23/2024 (Exact Date)   SpO2 100%   BMI 32.60 kg/m     Estimated body mass index is 32.6 kg/m  as calculated from the following:    Height as of this encounter: 1.62 m (5' 3.78\").    Weight as of this encounter: 85.5 kg (188 lb 9.6 oz).    Physical Exam  GENERAL: alert and no distress  EYES: Eyes grossly normal to inspection, PERRL and conjunctivae and sclerae normal  HENT: ear canals and TM's normal, nose and mouth without ulcers or lesions  NECK: no adenopathy, no asymmetry, masses, or scars  RESP: lungs clear to auscultation - no rales, rhonchi or wheezes  CV: regular rate and rhythm, normal S1 S2, no S3 or S4, no murmur, click or rub, no peripheral edema  MS: no gross musculoskeletal defects noted, no edema        Signed Electronically by: NORBERTO Fofana CNP    Answers submitted by the patient for this visit:  Patient Health Questionnaire (G7) (Submitted on 11/25/2024)  MIMI 7 TOTAL SCORE: 6    "

## 2024-12-18 ENCOUNTER — PATIENT OUTREACH (OUTPATIENT)
Dept: PEDIATRICS | Facility: CLINIC | Age: 34
End: 2024-12-18
Payer: COMMERCIAL

## 2025-01-14 PROBLEM — N87.1 MODERATE DYSPLASIA OF CERVIX (CIN II): Status: ACTIVE | Noted: 2020-02-12

## 2025-05-27 ENCOUNTER — OFFICE VISIT (OUTPATIENT)
Dept: PEDIATRICS | Facility: CLINIC | Age: 35
End: 2025-05-27
Payer: COMMERCIAL

## 2025-05-27 VITALS
OXYGEN SATURATION: 97 % | SYSTOLIC BLOOD PRESSURE: 105 MMHG | HEART RATE: 105 BPM | BODY MASS INDEX: 31.74 KG/M2 | RESPIRATION RATE: 16 BRPM | TEMPERATURE: 97.5 F | WEIGHT: 185.9 LBS | DIASTOLIC BLOOD PRESSURE: 70 MMHG | HEIGHT: 64 IN

## 2025-05-27 DIAGNOSIS — J20.9 ACUTE BRONCHITIS, UNSPECIFIED ORGANISM: Primary | ICD-10-CM

## 2025-05-27 DIAGNOSIS — H73.91 TM (TYMPANIC MEMBRANE DISORDER), RIGHT: ICD-10-CM

## 2025-05-27 DIAGNOSIS — H66.011 NON-RECURRENT ACUTE SUPPURATIVE OTITIS MEDIA OF RIGHT EAR WITH SPONTANEOUS RUPTURE OF TYMPANIC MEMBRANE: ICD-10-CM

## 2025-05-27 PROCEDURE — 3074F SYST BP LT 130 MM HG: CPT | Performed by: PHYSICIAN ASSISTANT

## 2025-05-27 PROCEDURE — G2211 COMPLEX E/M VISIT ADD ON: HCPCS | Performed by: PHYSICIAN ASSISTANT

## 2025-05-27 PROCEDURE — 1126F AMNT PAIN NOTED NONE PRSNT: CPT | Performed by: PHYSICIAN ASSISTANT

## 2025-05-27 PROCEDURE — 99213 OFFICE O/P EST LOW 20 MIN: CPT | Performed by: PHYSICIAN ASSISTANT

## 2025-05-27 PROCEDURE — 3078F DIAST BP <80 MM HG: CPT | Performed by: PHYSICIAN ASSISTANT

## 2025-05-27 RX ORDER — PREDNISONE 20 MG/1
40 TABLET ORAL DAILY
Qty: 10 TABLET | Refills: 0 | Status: SHIPPED | OUTPATIENT
Start: 2025-05-27 | End: 2025-06-01

## 2025-05-27 ASSESSMENT — PAIN SCALES - GENERAL: PAINLEVEL_OUTOF10: NO PAIN (0)

## 2025-05-27 NOTE — PROGRESS NOTES
"Assessment & Plan     Acute bronchitis, unspecified organism  Recommend prednisone to assist in treatment of cough and ear symptoms.   Pt has tolerated medication in the past.   Augmentin will help to cover for bronchitis as well, prescribed for ear infection.  - predniSONE (DELTASONE) 20 MG tablet  Dispense: 10 tablet; Refill: 0    Tm (tympanic membrane disorder), right  Non-recurrent acute suppurative otitis media of right ear with spontaneous rupture of tympanic membrane  Continue with Ear drops as prescribed by ENT. Encouraged follow-up with ENT if not improving.  - amoxicillin-clavulanate (AUGMENTIN) 875-125 MG tablet  Dispense: 14 tablet; Refill: 0    BMI  Estimated body mass index is 32.13 kg/m  as calculated from the following:    Height as of this encounter: 1.62 m (5' 3.78\").    Weight as of this encounter: 84.3 kg (185 lb 14.4 oz).     FUTURE APPOINTMENTS:  -Follow-up as needed for acute symptoms      Nabor Rosales is a 34 year old, presenting for the following health issues:  URI        5/27/2025     9:45 AM   Additional Questions   Roomed by sierra   Accompanied by paul         5/27/2025     9:45 AM   Patient Reported Additional Medications   Patient reports taking the following new medications na     URI    History of Present Illness       Reason for visit:  Cough- coughing up mucus as of 5/27, significant ear fluid drainage  Symptom onset:  3-7 days ago  Symptoms include:  Cough- coughing up mucus as of 5/27, significant ear fluid drainage, minor sore throat  Symptom intensity:  Severe  Symptom progression:  Worsening  Had these symptoms before:  No She is missing 1 dose(s) of medications per week.  She is not taking prescribed medications regularly due to other.        Acute Illness  Acute illness concerns: 8 days  Onset/Duration: Last Monday  Symptoms:  Fever: No  Chills/Sweats: No  Headache (location?): No  Sinus Pressure: No  Conjunctivitis:  No  Ear Pain: YES- right. Draining  Rhinorrhea: " "No  Congestion: No  Sore Throat: YES  Cough: YES-productive of green sputum  Wheeze: No  Decreased Appetite: No  Nausea: No  Vomiting: No  Diarrhea: No  Dysuria/Freq.: No  Dysuria or Hematuria: No  Fatigue/Achiness: No  Sick/Strep Exposure: No; Daughter also sick  Therapies tried and outcome: None. Preventative ear drops      History of Present Illness-  Connie Bhakta, 34 years old, female    - Cough and ear drainage  - Itchy throat started early last week, possibly allergy-related  - Sore throat developed around Thursday last week  - Feeling better on Sunday, but cough attacks with mucus worsened yesterday afternoon  - Ear drainage despite using drops twice a day  - Chronic ear problems, regularly seeing ENT, using drops preventatively once a week  - Several reconstructive ear surgeries in the past  - No tubes in ears  - Sinus concerns and face pain at the beginning of last week, possibly allergy-related, lasted about a day  - Runny nose and stuffiness started yesterday and today  - No asthma or lung conditions, no need for inhaler  - No shortness of breath or difficulty breathing  - No fever, felt feverish last week for about a day but no actual fever  - Past use of oral antibiotics and steroids for ear issues  - No recent antibiotic use.    Review of Systems  Constitutional, HEENT, cardiovascular, pulmonary, gi and gu systems are negative, except as otherwise noted.      Objective    /70   Pulse 105   Temp 97.5  F (36.4  C) (Temporal)   Resp 16   Ht 1.62 m (5' 3.78\")   Wt 84.3 kg (185 lb 14.4 oz)   SpO2 97%   BMI 32.13 kg/m    Body mass index is 32.13 kg/m .    Physical Exam   GENERAL: alert and no distress  EYES: Eyes grossly normal to inspection, PERRL and conjunctivae and sclerae normal  HENT: normal cephalic/atraumatic, right ear: drainage present, TM erythematous, left ear: normal: no effusions, no erythema, normal landmarks, nose and mouth without ulcers or lesions, oropharynx clear, and " oral mucous membranes moist  NECK: no adenopathy, no asymmetry, masses, or scars  RESP: lungs clear to auscultation - no rales, rhonchi or wheezes. Wet cough present  CV: regular rate and rhythm, normal S1 S2, no S3 or S4, no murmur, click or rub, no peripheral edema  ABDOMEN: soft, nontender, no hepatosplenomegaly, no masses and bowel sounds normal  MS: no gross musculoskeletal defects noted, no edema  SKIN: no suspicious lesions or rashes  PSYCH: mentation appears normal, affect normal/bright        Signed Electronically by: Brie Nina PA-C

## (undated) DEVICE — LINEN TOWEL PACK X10 5473

## (undated) DEVICE — LINEN HALF SHEET 5512

## (undated) DEVICE — GLOVE BIOGEL PI MICRO SZ 7.0 48570

## (undated) DEVICE — CATH TRAY FOLEY SURESTEP 16FR DRAIN BAG STATOCK A899916

## (undated) DEVICE — SU PDS II 0 CT 36" Z358T

## (undated) DEVICE — PACK C-SECTION LF PL15OTA83B

## (undated) DEVICE — ADH LIQUID MASTISOL TOPICAL VIAL 2-3ML 0523-48

## (undated) DEVICE — SOL WATER IRRIG 1000ML BOTTLE 2F7114

## (undated) DEVICE — BLADE KNIFE SURG 10 371110

## (undated) DEVICE — STOCKING SLEEVE VASOPRESS COMPRESSION CALF MED 18" VP501M

## (undated) DEVICE — PAD CHUX UNDERPAD 30X36" P3036C

## (undated) DEVICE — GLOVE BIOGEL PI ULTRATOUCH SZ 7.5 41175

## (undated) DEVICE — SOL ADH LIQUID BENZOIN SWAB 0.6ML C1544

## (undated) DEVICE — SU VICRYL 3-0 CT-1 36" J944H

## (undated) DEVICE — GLOVE BIOGEL PI MICRO INDICATOR UNDERGLOVE SZ 8.0 48980

## (undated) DEVICE — ESU GROUND PAD ADULT W/CORD E7507

## (undated) DEVICE — DRSG STERI STRIP 1/2X4" R1547

## (undated) DEVICE — CAP BABY PINK/BLUE IC-2

## (undated) DEVICE — LINEN FULL SHEET 5511

## (undated) DEVICE — GLOVE BIOGEL PI MICRO INDICATOR UNDERGLOVE SZ 7.0 48970

## (undated) DEVICE — STPL SKIN SUBCUTICULAR INSORB  2030

## (undated) DEVICE — LINEN DRAPE 54X72" 5467

## (undated) DEVICE — PREP CHLORAPREP 26ML TINTED HI-LITE ORANGE 930815

## (undated) DEVICE — SU VICRYL 3-0 CT-1 36" J344H

## (undated) DEVICE — BAG CLEAR TRASH 1.3M 39X33" P4040C

## (undated) DEVICE — GLOVE BIOGEL PI MICRO SZ 7.5 48575

## (undated) DEVICE — SYR TRANSFER DEVICE BLOOD NDL HOLDER 3648800

## (undated) DEVICE — SU VICRYL 1 CTX 36" J371H

## (undated) DEVICE — GLOVE BIOGEL PI ULTRATOUCH SZ 8.0 41180

## (undated) DEVICE — SOL NACL 0.9% IRRIG 1000ML BOTTLE 2F7124

## (undated) DEVICE — BLADE CLIPPER SGL USE 9680

## (undated) DEVICE — SU VICRYL 0 CT-1 36" J346H

## (undated) RX ORDER — OXYTOCIN/0.9 % SODIUM CHLORIDE 30/500 ML
PLASTIC BAG, INJECTION (ML) INTRAVENOUS
Status: DISPENSED
Start: 2024-05-25

## (undated) RX ORDER — ONDANSETRON 2 MG/ML
INJECTION INTRAMUSCULAR; INTRAVENOUS
Status: DISPENSED
Start: 2024-05-25

## (undated) RX ORDER — FENTANYL CITRATE-0.9 % NACL/PF 10 MCG/ML
PLASTIC BAG, INJECTION (ML) INTRAVENOUS
Status: DISPENSED
Start: 2024-05-25

## (undated) RX ORDER — KETOROLAC TROMETHAMINE 30 MG/ML
INJECTION, SOLUTION INTRAMUSCULAR; INTRAVENOUS
Status: DISPENSED
Start: 2024-05-25

## (undated) RX ORDER — DEXAMETHASONE SODIUM PHOSPHATE 4 MG/ML
INJECTION, SOLUTION INTRA-ARTICULAR; INTRALESIONAL; INTRAMUSCULAR; INTRAVENOUS; SOFT TISSUE
Status: DISPENSED
Start: 2024-05-25

## (undated) RX ORDER — MORPHINE SULFATE 1 MG/ML
INJECTION, SOLUTION EPIDURAL; INTRATHECAL; INTRAVENOUS
Status: DISPENSED
Start: 2024-05-25